# Patient Record
Sex: FEMALE | Race: WHITE | NOT HISPANIC OR LATINO | Employment: OTHER | URBAN - METROPOLITAN AREA
[De-identification: names, ages, dates, MRNs, and addresses within clinical notes are randomized per-mention and may not be internally consistent; named-entity substitution may affect disease eponyms.]

---

## 2017-03-06 ENCOUNTER — COMMUNICATION - HEALTHEAST (OUTPATIENT)
Dept: FAMILY MEDICINE | Facility: CLINIC | Age: 67
End: 2017-03-06

## 2017-03-06 ENCOUNTER — OFFICE VISIT - HEALTHEAST (OUTPATIENT)
Dept: FAMILY MEDICINE | Facility: CLINIC | Age: 67
End: 2017-03-06

## 2017-03-06 DIAGNOSIS — Z79.899 MEDICATION MANAGEMENT: ICD-10-CM

## 2017-03-06 DIAGNOSIS — R26.9 ABNORMALITY OF GAIT: ICD-10-CM

## 2017-03-06 DIAGNOSIS — R13.10 DYSPHAGIA, UNSPECIFIED TYPE: ICD-10-CM

## 2017-03-06 DIAGNOSIS — Z00.00 HEALTH CARE MAINTENANCE: ICD-10-CM

## 2017-03-06 DIAGNOSIS — R53.1 LEFT-SIDED WEAKNESS: ICD-10-CM

## 2017-03-06 DIAGNOSIS — I10 ESSENTIAL HYPERTENSION, BENIGN: ICD-10-CM

## 2017-03-06 ASSESSMENT — MIFFLIN-ST. JEOR: SCORE: 988.06

## 2017-03-13 ENCOUNTER — OFFICE VISIT - HEALTHEAST (OUTPATIENT)
Dept: PHYSICAL THERAPY | Facility: CLINIC | Age: 67
End: 2017-03-13

## 2017-03-13 DIAGNOSIS — R26.9 ABNORMALITY OF GAIT: ICD-10-CM

## 2017-03-13 DIAGNOSIS — I69.922: ICD-10-CM

## 2017-03-13 DIAGNOSIS — M94.9 DISORDER OF BONE AND CARTILAGE, UNSPECIFIED: ICD-10-CM

## 2017-03-13 DIAGNOSIS — M62.81 GENERALIZED MUSCLE WEAKNESS: ICD-10-CM

## 2017-03-13 DIAGNOSIS — Z86.73 HISTORY OF CVA (CEREBROVASCULAR ACCIDENT): ICD-10-CM

## 2017-03-13 DIAGNOSIS — I10 ESSENTIAL HYPERTENSION, BENIGN: ICD-10-CM

## 2017-03-13 DIAGNOSIS — M89.9 DISORDER OF BONE AND CARTILAGE, UNSPECIFIED: ICD-10-CM

## 2017-03-13 DIAGNOSIS — N18.30 CHRONIC KIDNEY DISEASE, STAGE III (MODERATE) (H): ICD-10-CM

## 2017-03-20 ENCOUNTER — OFFICE VISIT - HEALTHEAST (OUTPATIENT)
Dept: PHYSICAL THERAPY | Facility: CLINIC | Age: 67
End: 2017-03-20

## 2017-03-20 DIAGNOSIS — Z86.73 HISTORY OF CVA (CEREBROVASCULAR ACCIDENT): ICD-10-CM

## 2017-03-20 DIAGNOSIS — N18.30 CHRONIC KIDNEY DISEASE, STAGE III (MODERATE) (H): ICD-10-CM

## 2017-03-20 DIAGNOSIS — R26.9 ABNORMALITY OF GAIT: ICD-10-CM

## 2017-03-20 DIAGNOSIS — M94.9 DISORDER OF BONE AND CARTILAGE, UNSPECIFIED: ICD-10-CM

## 2017-03-20 DIAGNOSIS — M62.81 GENERALIZED MUSCLE WEAKNESS: ICD-10-CM

## 2017-03-20 DIAGNOSIS — I10 ESSENTIAL HYPERTENSION, BENIGN: ICD-10-CM

## 2017-03-20 DIAGNOSIS — I69.922: ICD-10-CM

## 2017-03-20 DIAGNOSIS — M89.9 DISORDER OF BONE AND CARTILAGE, UNSPECIFIED: ICD-10-CM

## 2017-03-21 ENCOUNTER — COMMUNICATION - HEALTHEAST (OUTPATIENT)
Dept: FAMILY MEDICINE | Facility: CLINIC | Age: 67
End: 2017-03-21

## 2017-03-27 ENCOUNTER — OFFICE VISIT - HEALTHEAST (OUTPATIENT)
Dept: PHYSICAL THERAPY | Facility: CLINIC | Age: 67
End: 2017-03-27

## 2017-03-27 DIAGNOSIS — I10 ESSENTIAL HYPERTENSION, BENIGN: ICD-10-CM

## 2017-03-27 DIAGNOSIS — Z86.73 HISTORY OF CVA (CEREBROVASCULAR ACCIDENT): ICD-10-CM

## 2017-03-27 DIAGNOSIS — M62.81 GENERALIZED MUSCLE WEAKNESS: ICD-10-CM

## 2017-03-27 DIAGNOSIS — M94.9 DISORDER OF BONE AND CARTILAGE, UNSPECIFIED: ICD-10-CM

## 2017-03-27 DIAGNOSIS — R26.9 ABNORMALITY OF GAIT: ICD-10-CM

## 2017-03-27 DIAGNOSIS — M89.9 DISORDER OF BONE AND CARTILAGE, UNSPECIFIED: ICD-10-CM

## 2017-03-27 DIAGNOSIS — N18.30 CHRONIC KIDNEY DISEASE, STAGE III (MODERATE) (H): ICD-10-CM

## 2017-03-27 DIAGNOSIS — I69.922: ICD-10-CM

## 2017-03-31 ENCOUNTER — RECORDS - HEALTHEAST (OUTPATIENT)
Dept: ADMINISTRATIVE | Facility: OTHER | Age: 67
End: 2017-03-31

## 2017-04-03 ENCOUNTER — HOSPITAL ENCOUNTER (OUTPATIENT)
Dept: MAMMOGRAPHY | Facility: HOSPITAL | Age: 67
Discharge: HOME | End: 2017-04-03
Attending: NURSE PRACTITIONER

## 2017-04-03 DIAGNOSIS — Z12.31 VISIT FOR SCREENING MAMMOGRAM: ICD-10-CM

## 2017-04-03 DIAGNOSIS — Z00.00 HEALTH CARE MAINTENANCE: ICD-10-CM

## 2017-04-04 ENCOUNTER — OFFICE VISIT - HEALTHEAST (OUTPATIENT)
Dept: PHYSICAL THERAPY | Facility: CLINIC | Age: 67
End: 2017-04-04

## 2017-04-04 DIAGNOSIS — N18.30 CHRONIC KIDNEY DISEASE, STAGE III (MODERATE) (H): ICD-10-CM

## 2017-04-04 DIAGNOSIS — M94.9 DISORDER OF BONE AND CARTILAGE, UNSPECIFIED: ICD-10-CM

## 2017-04-04 DIAGNOSIS — I10 ESSENTIAL HYPERTENSION, BENIGN: ICD-10-CM

## 2017-04-04 DIAGNOSIS — I69.922: ICD-10-CM

## 2017-04-04 DIAGNOSIS — R26.9 ABNORMALITY OF GAIT: ICD-10-CM

## 2017-04-04 DIAGNOSIS — M89.9 DISORDER OF BONE AND CARTILAGE, UNSPECIFIED: ICD-10-CM

## 2017-04-04 DIAGNOSIS — Z86.73 HISTORY OF CVA (CEREBROVASCULAR ACCIDENT): ICD-10-CM

## 2017-04-04 DIAGNOSIS — M62.81 GENERALIZED MUSCLE WEAKNESS: ICD-10-CM

## 2017-04-07 ENCOUNTER — RECORDS - HEALTHEAST (OUTPATIENT)
Dept: ADMINISTRATIVE | Facility: OTHER | Age: 67
End: 2017-04-07

## 2017-04-10 ENCOUNTER — HOSPITAL ENCOUNTER (OUTPATIENT)
Dept: RADIOLOGY | Facility: HOSPITAL | Age: 67
Discharge: HOME | End: 2017-04-10
Attending: PHYSICIAN ASSISTANT

## 2017-04-10 DIAGNOSIS — R10.13 DYSPEPSIA: ICD-10-CM

## 2017-04-11 ENCOUNTER — OFFICE VISIT - HEALTHEAST (OUTPATIENT)
Dept: PHYSICAL THERAPY | Facility: CLINIC | Age: 67
End: 2017-04-11

## 2017-04-11 DIAGNOSIS — I10 ESSENTIAL HYPERTENSION, BENIGN: ICD-10-CM

## 2017-04-11 DIAGNOSIS — M89.9 DISORDER OF BONE AND CARTILAGE, UNSPECIFIED: ICD-10-CM

## 2017-04-11 DIAGNOSIS — N18.30 CHRONIC KIDNEY DISEASE, STAGE III (MODERATE) (H): ICD-10-CM

## 2017-04-11 DIAGNOSIS — Z86.73 HISTORY OF CVA (CEREBROVASCULAR ACCIDENT): ICD-10-CM

## 2017-04-11 DIAGNOSIS — I69.922: ICD-10-CM

## 2017-04-11 DIAGNOSIS — M62.81 GENERALIZED MUSCLE WEAKNESS: ICD-10-CM

## 2017-04-11 DIAGNOSIS — M94.9 DISORDER OF BONE AND CARTILAGE, UNSPECIFIED: ICD-10-CM

## 2017-04-11 DIAGNOSIS — R26.9 ABNORMALITY OF GAIT: ICD-10-CM

## 2017-04-17 ENCOUNTER — OFFICE VISIT - HEALTHEAST (OUTPATIENT)
Dept: PHYSICAL THERAPY | Facility: CLINIC | Age: 67
End: 2017-04-17

## 2017-04-17 DIAGNOSIS — R26.9 ABNORMALITY OF GAIT: ICD-10-CM

## 2017-04-17 DIAGNOSIS — N18.30 CHRONIC KIDNEY DISEASE, STAGE III (MODERATE) (H): ICD-10-CM

## 2017-04-17 DIAGNOSIS — M62.81 GENERALIZED MUSCLE WEAKNESS: ICD-10-CM

## 2017-04-17 DIAGNOSIS — I10 ESSENTIAL HYPERTENSION, BENIGN: ICD-10-CM

## 2017-04-17 DIAGNOSIS — Z86.73 HISTORY OF CVA (CEREBROVASCULAR ACCIDENT): ICD-10-CM

## 2017-04-17 DIAGNOSIS — I69.922: ICD-10-CM

## 2017-04-17 DIAGNOSIS — M94.9 DISORDER OF BONE AND CARTILAGE, UNSPECIFIED: ICD-10-CM

## 2017-04-17 DIAGNOSIS — M89.9 DISORDER OF BONE AND CARTILAGE, UNSPECIFIED: ICD-10-CM

## 2017-04-24 ENCOUNTER — OFFICE VISIT - HEALTHEAST (OUTPATIENT)
Dept: PHYSICAL THERAPY | Facility: CLINIC | Age: 67
End: 2017-04-24

## 2017-04-24 DIAGNOSIS — I69.922: ICD-10-CM

## 2017-04-24 DIAGNOSIS — M89.9 DISORDER OF BONE AND CARTILAGE, UNSPECIFIED: ICD-10-CM

## 2017-04-24 DIAGNOSIS — N18.30 CHRONIC KIDNEY DISEASE, STAGE III (MODERATE) (H): ICD-10-CM

## 2017-04-24 DIAGNOSIS — I10 ESSENTIAL HYPERTENSION, BENIGN: ICD-10-CM

## 2017-04-24 DIAGNOSIS — M94.9 DISORDER OF BONE AND CARTILAGE, UNSPECIFIED: ICD-10-CM

## 2017-04-24 DIAGNOSIS — M62.81 GENERALIZED MUSCLE WEAKNESS: ICD-10-CM

## 2017-04-24 DIAGNOSIS — Z86.73 HISTORY OF CVA (CEREBROVASCULAR ACCIDENT): ICD-10-CM

## 2017-04-24 DIAGNOSIS — R26.9 ABNORMALITY OF GAIT: ICD-10-CM

## 2017-05-08 ENCOUNTER — OFFICE VISIT - HEALTHEAST (OUTPATIENT)
Dept: PHYSICAL THERAPY | Facility: CLINIC | Age: 67
End: 2017-05-08

## 2017-05-08 DIAGNOSIS — Z86.73 HISTORY OF CVA (CEREBROVASCULAR ACCIDENT): ICD-10-CM

## 2017-05-08 DIAGNOSIS — N18.30 CHRONIC KIDNEY DISEASE, STAGE III (MODERATE) (H): ICD-10-CM

## 2017-05-08 DIAGNOSIS — M94.9 DISORDER OF BONE AND CARTILAGE, UNSPECIFIED: ICD-10-CM

## 2017-05-08 DIAGNOSIS — I10 ESSENTIAL HYPERTENSION, BENIGN: ICD-10-CM

## 2017-05-08 DIAGNOSIS — I69.922: ICD-10-CM

## 2017-05-08 DIAGNOSIS — R26.9 ABNORMALITY OF GAIT: ICD-10-CM

## 2017-05-08 DIAGNOSIS — M62.81 GENERALIZED MUSCLE WEAKNESS: ICD-10-CM

## 2017-05-08 DIAGNOSIS — M89.9 DISORDER OF BONE AND CARTILAGE, UNSPECIFIED: ICD-10-CM

## 2017-05-17 ENCOUNTER — OFFICE VISIT - HEALTHEAST (OUTPATIENT)
Dept: PHYSICAL THERAPY | Facility: CLINIC | Age: 67
End: 2017-05-17

## 2017-05-17 DIAGNOSIS — I69.922: ICD-10-CM

## 2017-05-17 DIAGNOSIS — M89.9 DISORDER OF BONE AND CARTILAGE, UNSPECIFIED: ICD-10-CM

## 2017-05-17 DIAGNOSIS — M94.9 DISORDER OF BONE AND CARTILAGE, UNSPECIFIED: ICD-10-CM

## 2017-05-17 DIAGNOSIS — I10 ESSENTIAL HYPERTENSION, BENIGN: ICD-10-CM

## 2017-05-17 DIAGNOSIS — M62.81 GENERALIZED MUSCLE WEAKNESS: ICD-10-CM

## 2017-05-17 DIAGNOSIS — R26.9 ABNORMALITY OF GAIT: ICD-10-CM

## 2017-05-17 DIAGNOSIS — N18.30 CHRONIC KIDNEY DISEASE, STAGE III (MODERATE) (H): ICD-10-CM

## 2017-05-17 DIAGNOSIS — Z86.73 HISTORY OF CVA (CEREBROVASCULAR ACCIDENT): ICD-10-CM

## 2017-05-26 ENCOUNTER — RECORDS - HEALTHEAST (OUTPATIENT)
Dept: ADMINISTRATIVE | Facility: OTHER | Age: 67
End: 2017-05-26

## 2017-05-30 ENCOUNTER — RECORDS - HEALTHEAST (OUTPATIENT)
Dept: ADMINISTRATIVE | Facility: OTHER | Age: 67
End: 2017-05-30

## 2017-06-02 ENCOUNTER — OFFICE VISIT - HEALTHEAST (OUTPATIENT)
Dept: PHYSICAL THERAPY | Facility: CLINIC | Age: 67
End: 2017-06-02

## 2017-06-02 DIAGNOSIS — M89.9 DISORDER OF BONE AND CARTILAGE, UNSPECIFIED: ICD-10-CM

## 2017-06-02 DIAGNOSIS — I69.922: ICD-10-CM

## 2017-06-02 DIAGNOSIS — N18.30 CHRONIC KIDNEY DISEASE, STAGE III (MODERATE) (H): ICD-10-CM

## 2017-06-02 DIAGNOSIS — I10 ESSENTIAL HYPERTENSION, BENIGN: ICD-10-CM

## 2017-06-02 DIAGNOSIS — M62.81 GENERALIZED MUSCLE WEAKNESS: ICD-10-CM

## 2017-06-02 DIAGNOSIS — R26.9 ABNORMALITY OF GAIT: ICD-10-CM

## 2017-06-02 DIAGNOSIS — M94.9 DISORDER OF BONE AND CARTILAGE, UNSPECIFIED: ICD-10-CM

## 2017-06-02 DIAGNOSIS — Z86.73 HISTORY OF CVA (CEREBROVASCULAR ACCIDENT): ICD-10-CM

## 2017-06-07 ENCOUNTER — OFFICE VISIT - HEALTHEAST (OUTPATIENT)
Dept: PHYSICAL THERAPY | Facility: CLINIC | Age: 67
End: 2017-06-07

## 2017-06-07 ENCOUNTER — OFFICE VISIT - HEALTHEAST (OUTPATIENT)
Dept: FAMILY MEDICINE | Facility: CLINIC | Age: 67
End: 2017-06-07

## 2017-06-07 DIAGNOSIS — M62.81 GENERALIZED MUSCLE WEAKNESS: ICD-10-CM

## 2017-06-07 DIAGNOSIS — N18.30 CHRONIC KIDNEY DISEASE, STAGE III (MODERATE) (H): ICD-10-CM

## 2017-06-07 DIAGNOSIS — R26.9 ABNORMALITY OF GAIT: ICD-10-CM

## 2017-06-07 DIAGNOSIS — I69.922: ICD-10-CM

## 2017-06-07 DIAGNOSIS — M94.9 DISORDER OF BONE AND CARTILAGE, UNSPECIFIED: ICD-10-CM

## 2017-06-07 DIAGNOSIS — M89.9 DISORDER OF BONE AND CARTILAGE, UNSPECIFIED: ICD-10-CM

## 2017-06-07 DIAGNOSIS — Z86.73 HISTORY OF CVA (CEREBROVASCULAR ACCIDENT): ICD-10-CM

## 2017-06-07 DIAGNOSIS — I10 ESSENTIAL HYPERTENSION, BENIGN: ICD-10-CM

## 2017-06-16 ENCOUNTER — OFFICE VISIT - HEALTHEAST (OUTPATIENT)
Dept: PHYSICAL THERAPY | Facility: CLINIC | Age: 67
End: 2017-06-16

## 2017-06-16 DIAGNOSIS — I10 ESSENTIAL HYPERTENSION, BENIGN: ICD-10-CM

## 2017-06-16 DIAGNOSIS — M89.9 DISORDER OF BONE AND CARTILAGE, UNSPECIFIED: ICD-10-CM

## 2017-06-16 DIAGNOSIS — M94.9 DISORDER OF BONE AND CARTILAGE, UNSPECIFIED: ICD-10-CM

## 2017-06-16 DIAGNOSIS — Z86.73 HISTORY OF CVA (CEREBROVASCULAR ACCIDENT): ICD-10-CM

## 2017-06-16 DIAGNOSIS — I69.922: ICD-10-CM

## 2017-06-16 DIAGNOSIS — M62.81 GENERALIZED MUSCLE WEAKNESS: ICD-10-CM

## 2017-06-16 DIAGNOSIS — N18.30 CHRONIC KIDNEY DISEASE, STAGE III (MODERATE) (H): ICD-10-CM

## 2017-06-16 DIAGNOSIS — R26.9 ABNORMALITY OF GAIT: ICD-10-CM

## 2017-06-21 ENCOUNTER — OFFICE VISIT - HEALTHEAST (OUTPATIENT)
Dept: PHYSICAL THERAPY | Facility: CLINIC | Age: 67
End: 2017-06-21

## 2017-06-21 DIAGNOSIS — M89.9 DISORDER OF BONE AND CARTILAGE, UNSPECIFIED: ICD-10-CM

## 2017-06-21 DIAGNOSIS — N18.30 CHRONIC KIDNEY DISEASE, STAGE III (MODERATE) (H): ICD-10-CM

## 2017-06-21 DIAGNOSIS — Z86.73 HISTORY OF CVA (CEREBROVASCULAR ACCIDENT): ICD-10-CM

## 2017-06-21 DIAGNOSIS — M62.81 GENERALIZED MUSCLE WEAKNESS: ICD-10-CM

## 2017-06-21 DIAGNOSIS — R26.9 ABNORMALITY OF GAIT: ICD-10-CM

## 2017-06-21 DIAGNOSIS — I10 ESSENTIAL HYPERTENSION, BENIGN: ICD-10-CM

## 2017-06-21 DIAGNOSIS — I69.922: ICD-10-CM

## 2017-06-21 DIAGNOSIS — M94.9 DISORDER OF BONE AND CARTILAGE, UNSPECIFIED: ICD-10-CM

## 2017-07-03 ENCOUNTER — OFFICE VISIT - HEALTHEAST (OUTPATIENT)
Dept: PHYSICAL THERAPY | Facility: REHABILITATION | Age: 67
End: 2017-07-03

## 2017-07-03 DIAGNOSIS — I69.922: ICD-10-CM

## 2017-07-03 DIAGNOSIS — M94.9 DISORDER OF BONE AND CARTILAGE, UNSPECIFIED: ICD-10-CM

## 2017-07-03 DIAGNOSIS — I10 ESSENTIAL HYPERTENSION, BENIGN: ICD-10-CM

## 2017-07-03 DIAGNOSIS — N18.30 CHRONIC KIDNEY DISEASE, STAGE III (MODERATE) (H): ICD-10-CM

## 2017-07-03 DIAGNOSIS — Z86.73 HISTORY OF CVA (CEREBROVASCULAR ACCIDENT): ICD-10-CM

## 2017-07-03 DIAGNOSIS — M62.81 GENERALIZED MUSCLE WEAKNESS: ICD-10-CM

## 2017-07-03 DIAGNOSIS — M89.9 DISORDER OF BONE AND CARTILAGE, UNSPECIFIED: ICD-10-CM

## 2017-07-03 DIAGNOSIS — R26.9 ABNORMALITY OF GAIT: ICD-10-CM

## 2017-07-07 ENCOUNTER — HOME CARE/HOSPICE - HEALTHEAST (OUTPATIENT)
Dept: HOME HEALTH SERVICES | Facility: HOME HEALTH | Age: 67
End: 2017-07-07

## 2017-07-10 ENCOUNTER — OFFICE VISIT - HEALTHEAST (OUTPATIENT)
Dept: PHYSICAL THERAPY | Facility: REHABILITATION | Age: 67
End: 2017-07-10

## 2017-07-10 ENCOUNTER — OFFICE VISIT - HEALTHEAST (OUTPATIENT)
Dept: FAMILY MEDICINE | Facility: CLINIC | Age: 67
End: 2017-07-10

## 2017-07-10 DIAGNOSIS — Z86.73 HISTORY OF CVA (CEREBROVASCULAR ACCIDENT): ICD-10-CM

## 2017-07-10 DIAGNOSIS — I69.922: ICD-10-CM

## 2017-07-10 DIAGNOSIS — I10 ESSENTIAL HYPERTENSION: ICD-10-CM

## 2017-07-10 DIAGNOSIS — R26.9 ABNORMALITY OF GAIT: ICD-10-CM

## 2017-07-10 DIAGNOSIS — J18.9 COMMUNITY ACQUIRED PNEUMONIA: ICD-10-CM

## 2017-07-10 DIAGNOSIS — I10 ESSENTIAL HYPERTENSION, BENIGN: ICD-10-CM

## 2017-07-10 DIAGNOSIS — N18.30 CHRONIC KIDNEY DISEASE, STAGE III (MODERATE) (H): ICD-10-CM

## 2017-07-10 DIAGNOSIS — M62.81 GENERALIZED MUSCLE WEAKNESS: ICD-10-CM

## 2017-07-10 DIAGNOSIS — M89.9 DISORDER OF BONE AND CARTILAGE, UNSPECIFIED: ICD-10-CM

## 2017-07-10 DIAGNOSIS — R19.7 DIARRHEA: ICD-10-CM

## 2017-07-10 DIAGNOSIS — M94.9 DISORDER OF BONE AND CARTILAGE, UNSPECIFIED: ICD-10-CM

## 2017-07-17 ENCOUNTER — OFFICE VISIT - HEALTHEAST (OUTPATIENT)
Dept: PHYSICAL THERAPY | Facility: REHABILITATION | Age: 67
End: 2017-07-17

## 2017-07-17 DIAGNOSIS — M94.9 DISORDER OF BONE AND CARTILAGE, UNSPECIFIED: ICD-10-CM

## 2017-07-17 DIAGNOSIS — R26.9 ABNORMALITY OF GAIT: ICD-10-CM

## 2017-07-17 DIAGNOSIS — I69.922: ICD-10-CM

## 2017-07-17 DIAGNOSIS — M62.81 GENERALIZED MUSCLE WEAKNESS: ICD-10-CM

## 2017-07-17 DIAGNOSIS — Z86.73 HISTORY OF CVA (CEREBROVASCULAR ACCIDENT): ICD-10-CM

## 2017-07-17 DIAGNOSIS — N18.30 CHRONIC KIDNEY DISEASE, STAGE III (MODERATE) (H): ICD-10-CM

## 2017-07-17 DIAGNOSIS — M89.9 DISORDER OF BONE AND CARTILAGE, UNSPECIFIED: ICD-10-CM

## 2017-07-17 DIAGNOSIS — I10 ESSENTIAL HYPERTENSION, BENIGN: ICD-10-CM

## 2017-07-24 ENCOUNTER — OFFICE VISIT - HEALTHEAST (OUTPATIENT)
Dept: PHYSICAL THERAPY | Facility: REHABILITATION | Age: 67
End: 2017-07-24

## 2017-07-24 DIAGNOSIS — N18.30 CHRONIC KIDNEY DISEASE, STAGE III (MODERATE) (H): ICD-10-CM

## 2017-07-24 DIAGNOSIS — Z86.73 HISTORY OF CVA (CEREBROVASCULAR ACCIDENT): ICD-10-CM

## 2017-07-24 DIAGNOSIS — I10 ESSENTIAL HYPERTENSION, BENIGN: ICD-10-CM

## 2017-07-24 DIAGNOSIS — M94.9 DISORDER OF BONE AND CARTILAGE, UNSPECIFIED: ICD-10-CM

## 2017-07-24 DIAGNOSIS — M62.81 GENERALIZED MUSCLE WEAKNESS: ICD-10-CM

## 2017-07-24 DIAGNOSIS — I69.922: ICD-10-CM

## 2017-07-24 DIAGNOSIS — R26.9 ABNORMALITY OF GAIT: ICD-10-CM

## 2017-07-24 DIAGNOSIS — M89.9 DISORDER OF BONE AND CARTILAGE, UNSPECIFIED: ICD-10-CM

## 2017-07-31 ENCOUNTER — OFFICE VISIT - HEALTHEAST (OUTPATIENT)
Dept: PHYSICAL THERAPY | Facility: REHABILITATION | Age: 67
End: 2017-07-31

## 2017-07-31 DIAGNOSIS — M62.81 GENERALIZED MUSCLE WEAKNESS: ICD-10-CM

## 2017-07-31 DIAGNOSIS — I69.922: ICD-10-CM

## 2017-07-31 DIAGNOSIS — N18.30 CHRONIC KIDNEY DISEASE, STAGE III (MODERATE) (H): ICD-10-CM

## 2017-07-31 DIAGNOSIS — I10 ESSENTIAL HYPERTENSION, BENIGN: ICD-10-CM

## 2017-07-31 DIAGNOSIS — M94.9 DISORDER OF BONE AND CARTILAGE, UNSPECIFIED: ICD-10-CM

## 2017-07-31 DIAGNOSIS — R26.9 ABNORMALITY OF GAIT: ICD-10-CM

## 2017-07-31 DIAGNOSIS — M89.9 DISORDER OF BONE AND CARTILAGE, UNSPECIFIED: ICD-10-CM

## 2017-07-31 DIAGNOSIS — Z86.73 HISTORY OF CVA (CEREBROVASCULAR ACCIDENT): ICD-10-CM

## 2017-08-02 ENCOUNTER — OFFICE VISIT - HEALTHEAST (OUTPATIENT)
Dept: FAMILY MEDICINE | Facility: CLINIC | Age: 67
End: 2017-08-02

## 2017-08-02 ENCOUNTER — RECORDS - HEALTHEAST (OUTPATIENT)
Dept: ADMINISTRATIVE | Facility: OTHER | Age: 67
End: 2017-08-02

## 2017-08-02 DIAGNOSIS — J18.9 PNEUMONIA: ICD-10-CM

## 2017-08-02 DIAGNOSIS — R13.10 SWALLOWING DISORDER: ICD-10-CM

## 2017-08-02 DIAGNOSIS — I69.322 CVA, OLD, DYSARTHRIA: ICD-10-CM

## 2017-08-02 DIAGNOSIS — I10 HTN (HYPERTENSION): ICD-10-CM

## 2017-08-30 ENCOUNTER — OFFICE VISIT - HEALTHEAST (OUTPATIENT)
Dept: PHYSICAL THERAPY | Facility: REHABILITATION | Age: 67
End: 2017-08-30

## 2017-08-30 DIAGNOSIS — I69.922: ICD-10-CM

## 2017-08-30 DIAGNOSIS — M89.9 DISORDER OF BONE AND CARTILAGE, UNSPECIFIED: ICD-10-CM

## 2017-08-30 DIAGNOSIS — R26.9 ABNORMALITY OF GAIT: ICD-10-CM

## 2017-08-30 DIAGNOSIS — I10 ESSENTIAL HYPERTENSION, BENIGN: ICD-10-CM

## 2017-08-30 DIAGNOSIS — M62.81 GENERALIZED MUSCLE WEAKNESS: ICD-10-CM

## 2017-08-30 DIAGNOSIS — N18.30 CHRONIC KIDNEY DISEASE, STAGE III (MODERATE) (H): ICD-10-CM

## 2017-08-30 DIAGNOSIS — Z86.73 HISTORY OF CVA (CEREBROVASCULAR ACCIDENT): ICD-10-CM

## 2017-08-30 DIAGNOSIS — M94.9 DISORDER OF BONE AND CARTILAGE, UNSPECIFIED: ICD-10-CM

## 2017-09-07 ENCOUNTER — OFFICE VISIT - HEALTHEAST (OUTPATIENT)
Dept: FAMILY MEDICINE | Facility: CLINIC | Age: 67
End: 2017-09-07

## 2017-09-07 DIAGNOSIS — Z00.00 HEALTH CARE MAINTENANCE: ICD-10-CM

## 2017-09-07 DIAGNOSIS — I10 HTN (HYPERTENSION), BENIGN: ICD-10-CM

## 2017-09-07 ASSESSMENT — MIFFLIN-ST. JEOR: SCORE: 988.97

## 2017-10-02 ENCOUNTER — OFFICE VISIT - HEALTHEAST (OUTPATIENT)
Dept: PHYSICAL THERAPY | Facility: REHABILITATION | Age: 67
End: 2017-10-02

## 2017-10-02 DIAGNOSIS — M62.81 GENERALIZED MUSCLE WEAKNESS: ICD-10-CM

## 2017-10-02 DIAGNOSIS — R26.9 ABNORMALITY OF GAIT: ICD-10-CM

## 2017-10-02 DIAGNOSIS — N18.30 CHRONIC KIDNEY DISEASE, STAGE III (MODERATE) (H): ICD-10-CM

## 2017-10-02 DIAGNOSIS — I10 ESSENTIAL HYPERTENSION, BENIGN: ICD-10-CM

## 2017-10-02 DIAGNOSIS — Z86.73 HISTORY OF CVA (CEREBROVASCULAR ACCIDENT): ICD-10-CM

## 2017-10-13 ENCOUNTER — OFFICE VISIT - HEALTHEAST (OUTPATIENT)
Dept: PHYSICAL THERAPY | Facility: REHABILITATION | Age: 67
End: 2017-10-13

## 2017-10-13 DIAGNOSIS — M62.81 GENERALIZED MUSCLE WEAKNESS: ICD-10-CM

## 2017-10-13 DIAGNOSIS — I10 ESSENTIAL HYPERTENSION, BENIGN: ICD-10-CM

## 2017-10-13 DIAGNOSIS — N18.30 CHRONIC KIDNEY DISEASE, STAGE III (MODERATE) (H): ICD-10-CM

## 2017-10-13 DIAGNOSIS — R26.9 ABNORMALITY OF GAIT: ICD-10-CM

## 2017-10-13 DIAGNOSIS — Z86.73 HISTORY OF CVA (CEREBROVASCULAR ACCIDENT): ICD-10-CM

## 2017-11-06 ENCOUNTER — OFFICE VISIT - HEALTHEAST (OUTPATIENT)
Dept: PHYSICAL THERAPY | Facility: REHABILITATION | Age: 67
End: 2017-11-06

## 2017-11-06 DIAGNOSIS — M62.81 GENERALIZED MUSCLE WEAKNESS: ICD-10-CM

## 2017-11-06 DIAGNOSIS — I10 ESSENTIAL HYPERTENSION, BENIGN: ICD-10-CM

## 2017-11-06 DIAGNOSIS — R26.9 ABNORMALITY OF GAIT: ICD-10-CM

## 2017-11-06 DIAGNOSIS — Z86.73 HISTORY OF CVA (CEREBROVASCULAR ACCIDENT): ICD-10-CM

## 2017-11-06 DIAGNOSIS — N18.30 CHRONIC KIDNEY DISEASE, STAGE III (MODERATE) (H): ICD-10-CM

## 2018-06-15 ENCOUNTER — OFFICE VISIT - HEALTHEAST (OUTPATIENT)
Dept: FAMILY MEDICINE | Facility: CLINIC | Age: 68
End: 2018-06-15

## 2018-06-15 DIAGNOSIS — R26.9 ABNORMALITY OF GAIT: ICD-10-CM

## 2018-06-15 DIAGNOSIS — I10 ESSENTIAL HYPERTENSION, BENIGN: ICD-10-CM

## 2018-06-15 DIAGNOSIS — R13.12 DYSPHAGIA, OROPHARYNGEAL PHASE: ICD-10-CM

## 2018-06-15 DIAGNOSIS — N18.30 CKD (CHRONIC KIDNEY DISEASE) STAGE 3, GFR 30-59 ML/MIN (H): ICD-10-CM

## 2018-06-15 DIAGNOSIS — I69.322 CVA, OLD, DYSARTHRIA: ICD-10-CM

## 2018-06-15 LAB
ANION GAP SERPL CALCULATED.3IONS-SCNC: 15 MMOL/L (ref 5–18)
BUN SERPL-MCNC: 31 MG/DL (ref 8–22)
CALCIUM SERPL-MCNC: 10.1 MG/DL (ref 8.5–10.5)
CHLORIDE BLD-SCNC: 105 MMOL/L (ref 98–107)
CO2 SERPL-SCNC: 21 MMOL/L (ref 22–31)
CREAT SERPL-MCNC: 1.9 MG/DL (ref 0.6–1.1)
ERYTHROCYTE [DISTWIDTH] IN BLOOD BY AUTOMATED COUNT: 13.7 % (ref 11–14.5)
GFR SERPL CREATININE-BSD FRML MDRD: 26 ML/MIN/1.73M2
GLUCOSE BLD-MCNC: 95 MG/DL (ref 70–125)
HCT VFR BLD AUTO: 38.7 % (ref 35–47)
HGB BLD-MCNC: 13.2 G/DL (ref 12–16)
MCH RBC QN AUTO: 25.9 PG (ref 27–34)
MCHC RBC AUTO-ENTMCNC: 34.1 G/DL (ref 32–36)
MCV RBC AUTO: 76 FL (ref 80–100)
PLATELET # BLD AUTO: 187 THOU/UL (ref 140–440)
PMV BLD AUTO: 9.9 FL (ref 7–10)
POTASSIUM BLD-SCNC: 4.6 MMOL/L (ref 3.5–5)
RBC # BLD AUTO: 5.1 MILL/UL (ref 3.8–5.4)
SODIUM SERPL-SCNC: 141 MMOL/L (ref 136–145)
WBC: 7.8 THOU/UL (ref 4–11)

## 2018-06-15 ASSESSMENT — MIFFLIN-ST. JEOR: SCORE: 1016.64

## 2018-06-18 ENCOUNTER — COMMUNICATION - HEALTHEAST (OUTPATIENT)
Dept: FAMILY MEDICINE | Facility: CLINIC | Age: 68
End: 2018-06-18

## 2018-06-18 LAB — 25(OH)D3 SERPL-MCNC: 44 NG/ML (ref 30–80)

## 2018-06-29 ENCOUNTER — COMMUNICATION - HEALTHEAST (OUTPATIENT)
Dept: FAMILY MEDICINE | Facility: CLINIC | Age: 68
End: 2018-06-29

## 2018-06-29 DIAGNOSIS — I10 HTN (HYPERTENSION): ICD-10-CM

## 2018-07-06 ENCOUNTER — COMMUNICATION - HEALTHEAST (OUTPATIENT)
Dept: FAMILY MEDICINE | Facility: CLINIC | Age: 68
End: 2018-07-06

## 2018-07-10 ENCOUNTER — OFFICE VISIT - HEALTHEAST (OUTPATIENT)
Dept: PHYSICAL THERAPY | Facility: REHABILITATION | Age: 68
End: 2018-07-10

## 2018-07-10 DIAGNOSIS — Z86.73 HISTORY OF CVA (CEREBROVASCULAR ACCIDENT): ICD-10-CM

## 2018-07-10 DIAGNOSIS — R26.9 ABNORMALITY OF GAIT: ICD-10-CM

## 2018-07-10 DIAGNOSIS — M62.81 GENERALIZED MUSCLE WEAKNESS: ICD-10-CM

## 2018-07-16 ENCOUNTER — OFFICE VISIT - HEALTHEAST (OUTPATIENT)
Dept: PHYSICAL THERAPY | Facility: REHABILITATION | Age: 68
End: 2018-07-16

## 2018-07-16 DIAGNOSIS — Z86.73 HISTORY OF CVA (CEREBROVASCULAR ACCIDENT): ICD-10-CM

## 2018-07-16 DIAGNOSIS — N18.30 CHRONIC KIDNEY DISEASE, STAGE III (MODERATE) (H): ICD-10-CM

## 2018-07-16 DIAGNOSIS — M62.81 GENERALIZED MUSCLE WEAKNESS: ICD-10-CM

## 2018-07-16 DIAGNOSIS — I10 ESSENTIAL HYPERTENSION, BENIGN: ICD-10-CM

## 2018-07-16 DIAGNOSIS — R26.9 ABNORMALITY OF GAIT: ICD-10-CM

## 2018-07-23 ENCOUNTER — OFFICE VISIT - HEALTHEAST (OUTPATIENT)
Dept: PHYSICAL THERAPY | Facility: REHABILITATION | Age: 68
End: 2018-07-23

## 2018-07-23 DIAGNOSIS — R26.9 ABNORMALITY OF GAIT: ICD-10-CM

## 2018-07-23 DIAGNOSIS — M62.81 GENERALIZED MUSCLE WEAKNESS: ICD-10-CM

## 2018-07-23 DIAGNOSIS — I10 ESSENTIAL HYPERTENSION, BENIGN: ICD-10-CM

## 2018-07-23 DIAGNOSIS — Z86.73 HISTORY OF CVA (CEREBROVASCULAR ACCIDENT): ICD-10-CM

## 2018-07-23 DIAGNOSIS — N18.30 CHRONIC KIDNEY DISEASE, STAGE III (MODERATE) (H): ICD-10-CM

## 2018-07-30 ENCOUNTER — OFFICE VISIT - HEALTHEAST (OUTPATIENT)
Dept: PHYSICAL THERAPY | Facility: REHABILITATION | Age: 68
End: 2018-07-30

## 2018-07-30 DIAGNOSIS — R26.9 ABNORMALITY OF GAIT: ICD-10-CM

## 2018-07-30 DIAGNOSIS — Z86.73 HISTORY OF CVA (CEREBROVASCULAR ACCIDENT): ICD-10-CM

## 2018-07-30 DIAGNOSIS — M62.81 GENERALIZED MUSCLE WEAKNESS: ICD-10-CM

## 2018-08-06 ENCOUNTER — OFFICE VISIT - HEALTHEAST (OUTPATIENT)
Dept: PHYSICAL THERAPY | Facility: REHABILITATION | Age: 68
End: 2018-08-06

## 2018-08-06 DIAGNOSIS — R26.9 ABNORMALITY OF GAIT: ICD-10-CM

## 2018-08-06 DIAGNOSIS — M62.81 GENERALIZED MUSCLE WEAKNESS: ICD-10-CM

## 2018-08-06 DIAGNOSIS — Z86.73 HISTORY OF CVA (CEREBROVASCULAR ACCIDENT): ICD-10-CM

## 2018-08-13 ENCOUNTER — OFFICE VISIT - HEALTHEAST (OUTPATIENT)
Dept: PHYSICAL THERAPY | Facility: REHABILITATION | Age: 68
End: 2018-08-13

## 2018-08-13 DIAGNOSIS — N18.30 CHRONIC KIDNEY DISEASE, STAGE III (MODERATE) (H): ICD-10-CM

## 2018-08-13 DIAGNOSIS — I10 ESSENTIAL HYPERTENSION, BENIGN: ICD-10-CM

## 2018-08-13 DIAGNOSIS — R26.9 ABNORMALITY OF GAIT: ICD-10-CM

## 2018-08-13 DIAGNOSIS — Z86.73 HISTORY OF CVA (CEREBROVASCULAR ACCIDENT): ICD-10-CM

## 2018-08-13 DIAGNOSIS — M62.81 GENERALIZED MUSCLE WEAKNESS: ICD-10-CM

## 2018-08-17 ENCOUNTER — HOME CARE/HOSPICE - HEALTHEAST (OUTPATIENT)
Dept: HOME HEALTH SERVICES | Facility: HOME HEALTH | Age: 68
End: 2018-08-17

## 2018-08-20 ENCOUNTER — COMMUNICATION - HEALTHEAST (OUTPATIENT)
Dept: PHYSICAL THERAPY | Facility: REHABILITATION | Age: 68
End: 2018-08-20

## 2018-08-27 ENCOUNTER — RECORDS - HEALTHEAST (OUTPATIENT)
Dept: LAB | Facility: CLINIC | Age: 68
End: 2018-08-27

## 2018-08-27 LAB
ANION GAP SERPL CALCULATED.3IONS-SCNC: 9 MMOL/L (ref 5–18)
BASOPHILS # BLD AUTO: 0.1 THOU/UL (ref 0–0.2)
BASOPHILS NFR BLD AUTO: 1 % (ref 0–2)
BUN SERPL-MCNC: 28 MG/DL (ref 8–22)
CALCIUM SERPL-MCNC: 9.2 MG/DL (ref 8.5–10.5)
CHLORIDE BLD-SCNC: 108 MMOL/L (ref 98–107)
CO2 SERPL-SCNC: 23 MMOL/L (ref 22–31)
CREAT SERPL-MCNC: 1.11 MG/DL (ref 0.6–1.1)
EOSINOPHIL # BLD AUTO: 0.3 THOU/UL (ref 0–0.4)
EOSINOPHIL NFR BLD AUTO: 3 % (ref 0–6)
ERYTHROCYTE [DISTWIDTH] IN BLOOD BY AUTOMATED COUNT: 16.4 % (ref 11–14.5)
GFR SERPL CREATININE-BSD FRML MDRD: 49 ML/MIN/1.73M2
GLUCOSE BLD-MCNC: 110 MG/DL (ref 70–125)
HCT VFR BLD AUTO: 33.3 % (ref 35–47)
HGB BLD-MCNC: 10.8 G/DL (ref 12–16)
LYMPHOCYTES # BLD AUTO: 1.3 THOU/UL (ref 0.8–4.4)
LYMPHOCYTES NFR BLD AUTO: 14 % (ref 20–40)
MAGNESIUM SERPL-MCNC: 2.2 MG/DL (ref 1.8–2.6)
MCH RBC QN AUTO: 26 PG (ref 27–34)
MCHC RBC AUTO-ENTMCNC: 32.4 G/DL (ref 32–36)
MCV RBC AUTO: 80 FL (ref 80–100)
MONOCYTES # BLD AUTO: 0.7 THOU/UL (ref 0–0.9)
MONOCYTES NFR BLD AUTO: 8 % (ref 2–10)
NEUTROPHILS # BLD AUTO: 6.7 THOU/UL (ref 2–7.7)
NEUTROPHILS NFR BLD AUTO: 75 % (ref 50–70)
PLATELET # BLD AUTO: 193 THOU/UL (ref 140–440)
PMV BLD AUTO: 11.8 FL (ref 8.5–12.5)
POTASSIUM BLD-SCNC: 4.7 MMOL/L (ref 3.5–5)
RBC # BLD AUTO: 4.16 MILL/UL (ref 3.8–5.4)
SODIUM SERPL-SCNC: 140 MMOL/L (ref 136–145)
TRANSFERRIN SERPL-MCNC: 193 MG/DL (ref 212–360)
WBC: 9.1 THOU/UL (ref 4–11)

## 2018-08-28 LAB
FERRITIN SERPL-MCNC: 83 NG/ML (ref 10–130)
FOLATE SERPL-MCNC: 8.9 NG/ML
VIT B12 SERPL-MCNC: 304 PG/ML (ref 213–816)

## 2018-08-30 ENCOUNTER — OFFICE VISIT - HEALTHEAST (OUTPATIENT)
Dept: GERIATRICS | Facility: CLINIC | Age: 68
End: 2018-08-30

## 2018-08-30 DIAGNOSIS — I10 ESSENTIAL HYPERTENSION: ICD-10-CM

## 2018-08-30 DIAGNOSIS — J69.0 ASPIRATION PNEUMONITIS (H): ICD-10-CM

## 2018-08-30 DIAGNOSIS — D64.9 ANEMIA: ICD-10-CM

## 2018-08-30 DIAGNOSIS — Z86.73 HISTORY OF STROKE: ICD-10-CM

## 2018-08-30 DIAGNOSIS — N18.9 CKD (CHRONIC KIDNEY DISEASE): ICD-10-CM

## 2018-08-30 DIAGNOSIS — R13.10 DYSPHAGIA: ICD-10-CM

## 2018-09-06 ENCOUNTER — OFFICE VISIT - HEALTHEAST (OUTPATIENT)
Dept: GERIATRICS | Facility: CLINIC | Age: 68
End: 2018-09-06

## 2018-09-06 DIAGNOSIS — N18.9 CKD (CHRONIC KIDNEY DISEASE): ICD-10-CM

## 2018-09-06 DIAGNOSIS — J69.0 ASPIRATION PNEUMONITIS (H): ICD-10-CM

## 2018-09-06 DIAGNOSIS — R13.12 OROPHARYNGEAL DYSPHAGIA: ICD-10-CM

## 2018-09-06 DIAGNOSIS — I10 HYPERTENSION: ICD-10-CM

## 2018-09-06 DIAGNOSIS — Z86.73 HISTORY OF STROKE: ICD-10-CM

## 2018-09-13 ENCOUNTER — OFFICE VISIT - HEALTHEAST (OUTPATIENT)
Dept: GERIATRICS | Facility: CLINIC | Age: 68
End: 2018-09-13

## 2018-09-13 DIAGNOSIS — I10 ESSENTIAL HYPERTENSION: ICD-10-CM

## 2018-09-13 DIAGNOSIS — N18.9 CKD (CHRONIC KIDNEY DISEASE): ICD-10-CM

## 2018-09-13 DIAGNOSIS — R13.10 DYSPHAGIA: ICD-10-CM

## 2018-09-13 DIAGNOSIS — Z86.73 HISTORY OF STROKE: ICD-10-CM

## 2018-09-13 DIAGNOSIS — D64.9 ANEMIA, UNSPECIFIED TYPE: ICD-10-CM

## 2018-09-20 ENCOUNTER — OFFICE VISIT - HEALTHEAST (OUTPATIENT)
Dept: GERIATRICS | Facility: CLINIC | Age: 68
End: 2018-09-20

## 2018-09-20 DIAGNOSIS — R13.10 DYSPHAGIA: ICD-10-CM

## 2018-09-20 DIAGNOSIS — I10 ESSENTIAL HYPERTENSION: ICD-10-CM

## 2018-09-20 DIAGNOSIS — Z86.73 HISTORY OF STROKE: ICD-10-CM

## 2018-09-20 DIAGNOSIS — N18.9 CKD (CHRONIC KIDNEY DISEASE): ICD-10-CM

## 2018-09-20 DIAGNOSIS — D64.9 ANEMIA: ICD-10-CM

## 2018-10-02 ENCOUNTER — OFFICE VISIT - HEALTHEAST (OUTPATIENT)
Dept: GERIATRICS | Facility: CLINIC | Age: 68
End: 2018-10-02

## 2018-10-02 DIAGNOSIS — I10 ESSENTIAL HYPERTENSION: ICD-10-CM

## 2018-10-02 DIAGNOSIS — N18.9 CKD (CHRONIC KIDNEY DISEASE): ICD-10-CM

## 2018-10-02 DIAGNOSIS — Z86.73 HISTORY OF STROKE: ICD-10-CM

## 2018-10-02 DIAGNOSIS — D64.9 ANEMIA: ICD-10-CM

## 2018-10-02 DIAGNOSIS — R13.10 DYSPHAGIA: ICD-10-CM

## 2018-10-04 ENCOUNTER — OFFICE VISIT - HEALTHEAST (OUTPATIENT)
Dept: GERIATRICS | Facility: CLINIC | Age: 68
End: 2018-10-04

## 2018-10-04 DIAGNOSIS — R13.10 DYSPHAGIA: ICD-10-CM

## 2018-10-04 DIAGNOSIS — N18.9 CKD (CHRONIC KIDNEY DISEASE): ICD-10-CM

## 2018-10-04 DIAGNOSIS — D64.9 ANEMIA: ICD-10-CM

## 2018-10-04 DIAGNOSIS — I63.9 STROKE (H): ICD-10-CM

## 2018-10-04 DIAGNOSIS — I10 ESSENTIAL HYPERTENSION: ICD-10-CM

## 2018-10-09 ENCOUNTER — RECORDS - HEALTHEAST (OUTPATIENT)
Dept: ADMINISTRATIVE | Facility: OTHER | Age: 68
End: 2018-10-09

## 2018-10-10 ENCOUNTER — OFFICE VISIT - HEALTHEAST (OUTPATIENT)
Dept: GERIATRICS | Facility: CLINIC | Age: 68
End: 2018-10-10

## 2018-10-10 DIAGNOSIS — J69.0 ASPIRATION PNEUMONIA, UNSPECIFIED ASPIRATION PNEUMONIA TYPE, UNSPECIFIED LATERALITY, UNSPECIFIED PART OF LUNG (H): ICD-10-CM

## 2018-10-10 DIAGNOSIS — N18.30 CHRONIC KIDNEY DISEASE, STAGE III (MODERATE) (H): ICD-10-CM

## 2018-10-10 DIAGNOSIS — K21.9 GASTRO-ESOPHAGEAL REFLUX DISEASE WITHOUT ESOPHAGITIS: ICD-10-CM

## 2018-10-10 DIAGNOSIS — I10 ESSENTIAL HYPERTENSION, BENIGN: ICD-10-CM

## 2018-10-12 ENCOUNTER — HOSPITAL ENCOUNTER (OUTPATIENT)
Dept: RADIOLOGY | Facility: HOSPITAL | Age: 68
Discharge: HOME | End: 2018-10-12
Attending: FAMILY MEDICINE

## 2018-10-12 DIAGNOSIS — R13.12 OROPHARYNGEAL DYSPHAGIA: ICD-10-CM

## 2018-10-12 DIAGNOSIS — Z86.73 HISTORY OF STROKE: ICD-10-CM

## 2018-10-16 ENCOUNTER — OFFICE VISIT - HEALTHEAST (OUTPATIENT)
Dept: GERIATRICS | Facility: CLINIC | Age: 68
End: 2018-10-16

## 2018-10-16 DIAGNOSIS — N18.9 CKD (CHRONIC KIDNEY DISEASE): ICD-10-CM

## 2018-10-16 DIAGNOSIS — I63.9 STROKE (H): ICD-10-CM

## 2018-10-16 DIAGNOSIS — I10 ESSENTIAL HYPERTENSION: ICD-10-CM

## 2018-10-16 DIAGNOSIS — D64.9 ANEMIA: ICD-10-CM

## 2018-10-16 DIAGNOSIS — R13.10 DYSPHAGIA: ICD-10-CM

## 2018-10-23 ENCOUNTER — COMMUNICATION - HEALTHEAST (OUTPATIENT)
Dept: FAMILY MEDICINE | Facility: CLINIC | Age: 68
End: 2018-10-23

## 2018-10-23 ENCOUNTER — OFFICE VISIT - HEALTHEAST (OUTPATIENT)
Dept: GERIATRICS | Facility: CLINIC | Age: 68
End: 2018-10-23

## 2018-10-23 DIAGNOSIS — D64.9 ANEMIA, UNSPECIFIED TYPE: ICD-10-CM

## 2018-10-23 DIAGNOSIS — T17.908A ASPIRATION INTO AIRWAY: ICD-10-CM

## 2018-10-23 DIAGNOSIS — R13.10 DYSPHAGIA: ICD-10-CM

## 2018-10-23 DIAGNOSIS — I10 ESSENTIAL HYPERTENSION: ICD-10-CM

## 2018-10-23 DIAGNOSIS — N18.9 CKD (CHRONIC KIDNEY DISEASE): ICD-10-CM

## 2018-10-23 DIAGNOSIS — Z86.73 HISTORY OF STROKE: ICD-10-CM

## 2018-10-26 ENCOUNTER — AMBULATORY - HEALTHEAST (OUTPATIENT)
Dept: GERIATRICS | Facility: CLINIC | Age: 68
End: 2018-10-26

## 2018-10-26 ENCOUNTER — COMMUNICATION - HEALTHEAST (OUTPATIENT)
Dept: GERIATRICS | Facility: CLINIC | Age: 68
End: 2018-10-26

## 2018-10-29 ENCOUNTER — COMMUNICATION - HEALTHEAST (OUTPATIENT)
Dept: FAMILY MEDICINE | Facility: CLINIC | Age: 68
End: 2018-10-29

## 2018-10-30 ENCOUNTER — RECORDS - HEALTHEAST (OUTPATIENT)
Dept: LAB | Facility: CLINIC | Age: 68
End: 2018-10-30

## 2018-10-30 LAB
BASOPHILS # BLD AUTO: 0 THOU/UL (ref 0–0.2)
BASOPHILS NFR BLD AUTO: 1 % (ref 0–2)
EOSINOPHIL # BLD AUTO: 0.2 THOU/UL (ref 0–0.4)
EOSINOPHIL NFR BLD AUTO: 2 % (ref 0–6)
ERYTHROCYTE [DISTWIDTH] IN BLOOD BY AUTOMATED COUNT: 15.6 % (ref 11–14.5)
FLUAV AG SPEC QL IA: NORMAL
FLUBV AG SPEC QL IA: NORMAL
HCT VFR BLD AUTO: 34.8 % (ref 35–47)
HGB BLD-MCNC: 10.8 G/DL (ref 12–16)
LYMPHOCYTES # BLD AUTO: 1.1 THOU/UL (ref 0.8–4.4)
LYMPHOCYTES NFR BLD AUTO: 13 % (ref 20–40)
MCH RBC QN AUTO: 25.7 PG (ref 27–34)
MCHC RBC AUTO-ENTMCNC: 31 G/DL (ref 32–36)
MCV RBC AUTO: 83 FL (ref 80–100)
MONOCYTES # BLD AUTO: 1.1 THOU/UL (ref 0–0.9)
MONOCYTES NFR BLD AUTO: 13 % (ref 2–10)
NEUTROPHILS # BLD AUTO: 6.1 THOU/UL (ref 2–7.7)
NEUTROPHILS NFR BLD AUTO: 72 % (ref 50–70)
PLATELET # BLD AUTO: 194 THOU/UL (ref 140–440)
PMV BLD AUTO: 12.3 FL (ref 8.5–12.5)
RBC # BLD AUTO: 4.2 MILL/UL (ref 3.8–5.4)
WBC: 8.5 THOU/UL (ref 4–11)

## 2018-11-27 ENCOUNTER — RECORDS - HEALTHEAST (OUTPATIENT)
Dept: LAB | Facility: CLINIC | Age: 68
End: 2018-11-27

## 2018-11-27 ENCOUNTER — RECORDS - HEALTHEAST (OUTPATIENT)
Dept: ADMINISTRATIVE | Facility: OTHER | Age: 68
End: 2018-11-27

## 2018-11-28 LAB — ALBUMIN SERPL-MCNC: 3.7 G/DL (ref 3.5–5)

## 2018-12-05 ENCOUNTER — HOSPITAL ENCOUNTER (OUTPATIENT)
Dept: RADIOLOGY | Facility: HOSPITAL | Age: 68
Discharge: HOME | End: 2018-12-05

## 2018-12-05 DIAGNOSIS — R05.9 COUGH: ICD-10-CM

## 2018-12-05 DIAGNOSIS — R13.12 OROPHARYNGEAL DYSPHAGIA: ICD-10-CM

## 2018-12-05 DIAGNOSIS — K21.00 GERD WITH ESOPHAGITIS: ICD-10-CM

## 2019-01-17 ENCOUNTER — HOSPITAL ENCOUNTER (OUTPATIENT)
Dept: INTERVENTIONAL RADIOLOGY/VASCULAR | Facility: HOSPITAL | Age: 69
Discharge: HOME | End: 2019-01-17
Attending: NURSE PRACTITIONER | Admitting: RADIOLOGY

## 2019-01-17 DIAGNOSIS — E46 MALNUTRITION, UNSPECIFIED TYPE (H): ICD-10-CM

## 2019-04-17 ENCOUNTER — RECORDS - HEALTHEAST (OUTPATIENT)
Dept: ADMINISTRATIVE | Facility: OTHER | Age: 69
End: 2019-04-17

## 2019-04-18 ENCOUNTER — HOSPITAL ENCOUNTER (OUTPATIENT)
Dept: INTERVENTIONAL RADIOLOGY/VASCULAR | Facility: HOSPITAL | Age: 69
Discharge: HOME | End: 2019-04-18
Attending: RADIOLOGY | Admitting: RADIOLOGY

## 2019-04-18 DIAGNOSIS — R13.10 DYSPHAGIA: ICD-10-CM

## 2019-04-24 ENCOUNTER — RECORDS - HEALTHEAST (OUTPATIENT)
Dept: ADMINISTRATIVE | Facility: OTHER | Age: 69
End: 2019-04-24

## 2019-05-06 ENCOUNTER — HOSPITAL ENCOUNTER (OUTPATIENT)
Dept: RADIOLOGY | Facility: HOSPITAL | Age: 69
Discharge: HOME | End: 2019-05-06

## 2019-05-06 DIAGNOSIS — R13.12 OROPHARYNGEAL DYSPHAGIA: ICD-10-CM

## 2019-05-07 ENCOUNTER — RECORDS - HEALTHEAST (OUTPATIENT)
Dept: LAB | Facility: CLINIC | Age: 69
End: 2019-05-07

## 2019-05-08 LAB
ANION GAP SERPL CALCULATED.3IONS-SCNC: 9 MMOL/L (ref 5–18)
BUN SERPL-MCNC: 36 MG/DL (ref 8–28)
CALCIUM SERPL-MCNC: 9.7 MG/DL (ref 8.5–10.5)
CHLORIDE BLD-SCNC: 106 MMOL/L (ref 98–107)
CO2 SERPL-SCNC: 26 MMOL/L (ref 22–31)
CREAT SERPL-MCNC: 1.31 MG/DL (ref 0.6–1.1)
ERYTHROCYTE [DISTWIDTH] IN BLOOD BY AUTOMATED COUNT: 15.7 % (ref 11–14.5)
FERRITIN SERPL-MCNC: 17 NG/ML (ref 10–130)
GFR SERPL CREATININE-BSD FRML MDRD: 40 ML/MIN/1.73M2
GLUCOSE BLD-MCNC: 89 MG/DL (ref 70–125)
HCT VFR BLD AUTO: 39.1 % (ref 35–47)
HGB BLD-MCNC: 12.1 G/DL (ref 12–16)
IRON SATN MFR SERPL: 9 % (ref 20–50)
IRON SERPL-MCNC: 33 UG/DL (ref 42–175)
MAGNESIUM SERPL-MCNC: 2 MG/DL (ref 1.8–2.6)
MCH RBC QN AUTO: 24.5 PG (ref 27–34)
MCHC RBC AUTO-ENTMCNC: 30.9 G/DL (ref 32–36)
MCV RBC AUTO: 79 FL (ref 80–100)
PLATELET # BLD AUTO: 260 THOU/UL (ref 140–440)
PMV BLD AUTO: 12 FL (ref 8.5–12.5)
POTASSIUM BLD-SCNC: 4.8 MMOL/L (ref 3.5–5)
RBC # BLD AUTO: 4.93 MILL/UL (ref 3.8–5.4)
SODIUM SERPL-SCNC: 141 MMOL/L (ref 136–145)
TIBC SERPL-MCNC: 374 UG/DL (ref 313–563)
TRANSFERRIN SERPL-MCNC: 299 MG/DL (ref 212–360)
WBC: 10.3 THOU/UL (ref 4–11)

## 2019-05-09 LAB — 25(OH)D3 SERPL-MCNC: 60.8 NG/ML (ref 30–80)

## 2019-08-09 ENCOUNTER — AMBULATORY - HEALTHEAST (OUTPATIENT)
Dept: SCHEDULING | Facility: CLINIC | Age: 69
End: 2019-08-09

## 2019-08-09 DIAGNOSIS — Z93.1 G TUBE FEEDINGS (H): ICD-10-CM

## 2019-08-09 DIAGNOSIS — R13.10 DYSPHAGIA: ICD-10-CM

## 2019-08-14 ENCOUNTER — HOSPITAL ENCOUNTER (OUTPATIENT)
Dept: RADIOLOGY | Facility: HOSPITAL | Age: 69
Discharge: HOME | End: 2019-08-14
Attending: INTERNAL MEDICINE | Admitting: RADIOLOGY

## 2019-08-14 DIAGNOSIS — Z93.1 G TUBE FEEDINGS (H): ICD-10-CM

## 2019-08-14 DIAGNOSIS — R13.10 DYSPHAGIA: ICD-10-CM

## 2019-11-01 ENCOUNTER — RECORDS - HEALTHEAST (OUTPATIENT)
Dept: LAB | Facility: CLINIC | Age: 69
End: 2019-11-01

## 2019-11-01 LAB
ALBUMIN SERPL-MCNC: 3.9 G/DL (ref 3.5–5)
ALP SERPL-CCNC: 90 U/L (ref 45–120)
ALT SERPL W P-5'-P-CCNC: 33 U/L (ref 0–45)
ANION GAP SERPL CALCULATED.3IONS-SCNC: 8 MMOL/L (ref 5–18)
AST SERPL W P-5'-P-CCNC: 28 U/L (ref 0–40)
BILIRUB SERPL-MCNC: 0.8 MG/DL (ref 0–1)
BUN SERPL-MCNC: 33 MG/DL (ref 8–28)
CALCIUM SERPL-MCNC: 9.5 MG/DL (ref 8.5–10.5)
CHLORIDE BLD-SCNC: 104 MMOL/L (ref 98–107)
CO2 SERPL-SCNC: 29 MMOL/L (ref 22–31)
CREAT SERPL-MCNC: 1.25 MG/DL (ref 0.6–1.1)
ERYTHROCYTE [DISTWIDTH] IN BLOOD BY AUTOMATED COUNT: 16.4 % (ref 11–14.5)
FERRITIN SERPL-MCNC: 16 NG/ML (ref 10–130)
GFR SERPL CREATININE-BSD FRML MDRD: 42 ML/MIN/1.73M2
GLUCOSE BLD-MCNC: 86 MG/DL (ref 70–125)
HCT VFR BLD AUTO: 40.2 % (ref 35–47)
HGB BLD-MCNC: 12.4 G/DL (ref 12–16)
IRON SATN MFR SERPL: 13 % (ref 20–50)
IRON SERPL-MCNC: 48 UG/DL (ref 42–175)
MCH RBC QN AUTO: 24.1 PG (ref 27–34)
MCHC RBC AUTO-ENTMCNC: 30.8 G/DL (ref 32–36)
MCV RBC AUTO: 78 FL (ref 80–100)
PHOSPHATE SERPL-MCNC: 3.7 MG/DL (ref 2.5–4.5)
PLATELET # BLD AUTO: 245 THOU/UL (ref 140–440)
PMV BLD AUTO: 12.3 FL (ref 8.5–12.5)
POTASSIUM BLD-SCNC: 5.2 MMOL/L (ref 3.5–5)
PROT SERPL-MCNC: 7.1 G/DL (ref 6–8)
RBC # BLD AUTO: 5.14 MILL/UL (ref 3.8–5.4)
SODIUM SERPL-SCNC: 141 MMOL/L (ref 136–145)
TIBC SERPL-MCNC: 357 UG/DL (ref 313–563)
TRANSFERRIN SERPL-MCNC: 285 MG/DL (ref 212–360)
WBC: 8.7 THOU/UL (ref 4–11)

## 2019-11-06 ENCOUNTER — AMBULATORY - HEALTHEAST (OUTPATIENT)
Dept: SCHEDULING | Facility: CLINIC | Age: 69
End: 2019-11-06

## 2019-11-06 DIAGNOSIS — Z93.1 G TUBE FEEDINGS (H): ICD-10-CM

## 2019-11-13 ENCOUNTER — HOSPITAL ENCOUNTER (OUTPATIENT)
Dept: RADIOLOGY | Facility: HOSPITAL | Age: 69
Discharge: HOME | End: 2019-11-13
Admitting: RADIOLOGY

## 2019-11-13 DIAGNOSIS — Z93.1 G TUBE FEEDINGS (H): ICD-10-CM

## 2019-11-21 ENCOUNTER — RECORDS - HEALTHEAST (OUTPATIENT)
Dept: LAB | Facility: CLINIC | Age: 69
End: 2019-11-21

## 2019-11-21 LAB
ANION GAP SERPL CALCULATED.3IONS-SCNC: 10 MMOL/L (ref 5–18)
BUN SERPL-MCNC: 34 MG/DL (ref 8–28)
CALCIUM SERPL-MCNC: 9.3 MG/DL (ref 8.5–10.5)
CHLORIDE BLD-SCNC: 106 MMOL/L (ref 98–107)
CO2 SERPL-SCNC: 25 MMOL/L (ref 22–31)
CREAT SERPL-MCNC: 1.15 MG/DL (ref 0.6–1.1)
GFR SERPL CREATININE-BSD FRML MDRD: 47 ML/MIN/1.73M2
GLUCOSE BLD-MCNC: 82 MG/DL (ref 70–125)
POTASSIUM BLD-SCNC: 5 MMOL/L (ref 3.5–5)
SODIUM SERPL-SCNC: 141 MMOL/L (ref 136–145)

## 2020-01-23 ENCOUNTER — HOSPITAL ENCOUNTER (OUTPATIENT)
Dept: RADIOLOGY | Facility: HOSPITAL | Age: 70
Discharge: HOME | End: 2020-01-23
Admitting: RADIOLOGY

## 2020-01-23 DIAGNOSIS — Z93.1 G TUBE FEEDINGS (H): ICD-10-CM

## 2020-02-27 ENCOUNTER — RECORDS - HEALTHEAST (OUTPATIENT)
Dept: ADMINISTRATIVE | Facility: OTHER | Age: 70
End: 2020-02-27

## 2020-03-04 ENCOUNTER — COMMUNICATION - HEALTHEAST (OUTPATIENT)
Dept: SCHEDULING | Facility: CLINIC | Age: 70
End: 2020-03-04

## 2020-03-12 ENCOUNTER — OFFICE VISIT - HEALTHEAST (OUTPATIENT)
Dept: FAMILY MEDICINE | Facility: CLINIC | Age: 70
End: 2020-03-12

## 2020-03-12 ENCOUNTER — COMMUNICATION - HEALTHEAST (OUTPATIENT)
Dept: SCHEDULING | Facility: CLINIC | Age: 70
End: 2020-03-12

## 2020-03-12 DIAGNOSIS — R13.12 OROPHARYNGEAL DYSPHAGIA: ICD-10-CM

## 2020-03-12 DIAGNOSIS — I69.354 HEMIPLEGIA AND HEMIPARESIS FOLLOWING CEREBRAL INFARCTION AFFECTING LEFT NON-DOMINANT SIDE (H): ICD-10-CM

## 2020-03-12 DIAGNOSIS — E46 MALNUTRITION, UNSPECIFIED TYPE (H): ICD-10-CM

## 2020-03-12 DIAGNOSIS — Z76.89 ESTABLISHING CARE WITH NEW DOCTOR, ENCOUNTER FOR: ICD-10-CM

## 2020-03-18 ENCOUNTER — RECORDS - HEALTHEAST (OUTPATIENT)
Dept: ADMINISTRATIVE | Facility: OTHER | Age: 70
End: 2020-03-18

## 2020-04-27 ENCOUNTER — RECORDS - HEALTHEAST (OUTPATIENT)
Dept: ADMINISTRATIVE | Facility: OTHER | Age: 70
End: 2020-04-27

## 2020-04-29 ENCOUNTER — HOSPITAL ENCOUNTER (OUTPATIENT)
Dept: RADIOLOGY | Facility: HOSPITAL | Age: 70
Discharge: HOME | End: 2020-04-29
Admitting: RADIOLOGY

## 2020-04-29 DIAGNOSIS — Z93.1 G TUBE FEEDINGS (H): ICD-10-CM

## 2020-05-18 ENCOUNTER — RECORDS - HEALTHEAST (OUTPATIENT)
Dept: ADMINISTRATIVE | Facility: OTHER | Age: 70
End: 2020-05-18

## 2020-06-22 ENCOUNTER — RECORDS - HEALTHEAST (OUTPATIENT)
Dept: ADMINISTRATIVE | Facility: OTHER | Age: 70
End: 2020-06-22

## 2020-06-24 ENCOUNTER — COMMUNICATION - HEALTHEAST (OUTPATIENT)
Dept: FAMILY MEDICINE | Facility: CLINIC | Age: 70
End: 2020-06-24

## 2020-06-25 ENCOUNTER — COMMUNICATION - HEALTHEAST (OUTPATIENT)
Dept: CARE COORDINATION | Facility: CLINIC | Age: 70
End: 2020-06-25

## 2020-06-26 ENCOUNTER — AMBULATORY - HEALTHEAST (OUTPATIENT)
Dept: CARE COORDINATION | Facility: CLINIC | Age: 70
End: 2020-06-26

## 2020-06-26 DIAGNOSIS — R13.10 DYSPHAGIA: ICD-10-CM

## 2020-06-26 DIAGNOSIS — J69.0 ASPIRATION PNEUMONIA, UNSPECIFIED ASPIRATION PNEUMONIA TYPE, UNSPECIFIED LATERALITY, UNSPECIFIED PART OF LUNG (H): ICD-10-CM

## 2020-06-30 ENCOUNTER — OFFICE VISIT - HEALTHEAST (OUTPATIENT)
Dept: FAMILY MEDICINE | Facility: CLINIC | Age: 70
End: 2020-06-30

## 2020-06-30 DIAGNOSIS — I10 ESSENTIAL HYPERTENSION, BENIGN: ICD-10-CM

## 2020-06-30 DIAGNOSIS — I69.322 CVA, OLD, DYSARTHRIA: ICD-10-CM

## 2020-06-30 DIAGNOSIS — I69.922 DYSARTHRIA AS LATE EFFECT OF CEREBROVASCULAR DISEASE: ICD-10-CM

## 2020-06-30 DIAGNOSIS — R32 URINARY INCONTINENCE, UNSPECIFIED TYPE: ICD-10-CM

## 2020-06-30 DIAGNOSIS — J69.0 ASPIRATION PNEUMONIA, UNSPECIFIED ASPIRATION PNEUMONIA TYPE, UNSPECIFIED LATERALITY, UNSPECIFIED PART OF LUNG (H): ICD-10-CM

## 2020-06-30 DIAGNOSIS — R26.9 ABNORMALITY OF GAIT: ICD-10-CM

## 2020-06-30 DIAGNOSIS — R13.12 OROPHARYNGEAL DYSPHAGIA: ICD-10-CM

## 2020-07-01 ENCOUNTER — HOME CARE/HOSPICE - HEALTHEAST (OUTPATIENT)
Dept: HOME HEALTH SERVICES | Facility: HOME HEALTH | Age: 70
End: 2020-07-01

## 2020-07-01 ENCOUNTER — COMMUNICATION - HEALTHEAST (OUTPATIENT)
Dept: NURSING | Facility: CLINIC | Age: 70
End: 2020-07-01

## 2020-07-05 ENCOUNTER — COMMUNICATION - HEALTHEAST (OUTPATIENT)
Dept: HOME HEALTH SERVICES | Facility: HOME HEALTH | Age: 70
End: 2020-07-05

## 2020-07-07 ENCOUNTER — COMMUNICATION - HEALTHEAST (OUTPATIENT)
Dept: CARE COORDINATION | Facility: CLINIC | Age: 70
End: 2020-07-07

## 2020-07-07 ENCOUNTER — COMMUNICATION - HEALTHEAST (OUTPATIENT)
Dept: FAMILY MEDICINE | Facility: CLINIC | Age: 70
End: 2020-07-07

## 2020-07-09 ENCOUNTER — COMMUNICATION - HEALTHEAST (OUTPATIENT)
Dept: FAMILY MEDICINE | Facility: CLINIC | Age: 70
End: 2020-07-09

## 2020-07-09 ENCOUNTER — COMMUNICATION - HEALTHEAST (OUTPATIENT)
Dept: NURSING | Facility: CLINIC | Age: 70
End: 2020-07-09

## 2020-07-13 ENCOUNTER — COMMUNICATION - HEALTHEAST (OUTPATIENT)
Dept: FAMILY MEDICINE | Facility: CLINIC | Age: 70
End: 2020-07-13

## 2020-07-16 ENCOUNTER — COMMUNICATION - HEALTHEAST (OUTPATIENT)
Dept: FAMILY MEDICINE | Facility: CLINIC | Age: 70
End: 2020-07-16

## 2020-07-16 DIAGNOSIS — B37.89 CANDIDA RASH OF GROIN: ICD-10-CM

## 2020-07-16 RX ORDER — NYSTATIN 100000/G
POWDER (GRAM) TOPICAL
Qty: 15 G | Refills: 0 | Status: SHIPPED | OUTPATIENT
Start: 2020-07-16 | End: 2021-07-16

## 2020-07-21 ENCOUNTER — COMMUNICATION - HEALTHEAST (OUTPATIENT)
Dept: NURSING | Facility: CLINIC | Age: 70
End: 2020-07-21

## 2020-07-21 ENCOUNTER — OFFICE VISIT - HEALTHEAST (OUTPATIENT)
Dept: FAMILY MEDICINE | Facility: CLINIC | Age: 70
End: 2020-07-21

## 2020-07-21 ENCOUNTER — COMMUNICATION - HEALTHEAST (OUTPATIENT)
Dept: FAMILY MEDICINE | Facility: CLINIC | Age: 70
End: 2020-07-21

## 2020-07-21 ENCOUNTER — COMMUNICATION - HEALTHEAST (OUTPATIENT)
Dept: CARE COORDINATION | Facility: CLINIC | Age: 70
End: 2020-07-21

## 2020-07-21 DIAGNOSIS — R41.89 SELF-CARE DEFICIT FOR MEDICATION ADMINISTRATION: ICD-10-CM

## 2020-07-21 DIAGNOSIS — Z91.148 CONFLICTED ATTITUDE TOWARDS MEDICATION MANAGEMENT: ICD-10-CM
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
## 2020-07-21 DIAGNOSIS — R23.4 INDURATION, SKIN: ICD-10-CM

## 2020-07-21 LAB
BASOPHILS # BLD AUTO: 0.1 THOU/UL (ref 0–0.2)
BASOPHILS NFR BLD AUTO: 1 % (ref 0–2)
EOSINOPHIL # BLD AUTO: 0.3 THOU/UL (ref 0–0.4)
EOSINOPHIL NFR BLD AUTO: 2 % (ref 0–6)
ERYTHROCYTE [DISTWIDTH] IN BLOOD BY AUTOMATED COUNT: 15.1 % (ref 11–14.5)
HCT VFR BLD AUTO: 41.7 % (ref 35–47)
HGB BLD-MCNC: 12.8 G/DL (ref 12–16)
LYMPHOCYTES # BLD AUTO: 1.5 THOU/UL (ref 0.8–4.4)
LYMPHOCYTES NFR BLD AUTO: 14 % (ref 20–40)
MCH RBC QN AUTO: 24.9 PG (ref 27–34)
MCHC RBC AUTO-ENTMCNC: 30.7 G/DL (ref 32–36)
MCV RBC AUTO: 81 FL (ref 80–100)
MONOCYTES # BLD AUTO: 0.8 THOU/UL (ref 0–0.9)
MONOCYTES NFR BLD AUTO: 7 % (ref 2–10)
NEUTROPHILS # BLD AUTO: 8 THOU/UL (ref 2–7.7)
NEUTROPHILS NFR BLD AUTO: 75 % (ref 50–70)
PLATELET # BLD AUTO: 258 THOU/UL (ref 140–440)
PMV BLD AUTO: 12.6 FL (ref 8.5–12.5)
RBC # BLD AUTO: 5.15 MILL/UL (ref 3.8–5.4)
WBC: 10.7 THOU/UL (ref 4–11)

## 2020-07-28 ENCOUNTER — COMMUNICATION - HEALTHEAST (OUTPATIENT)
Dept: NURSING | Facility: CLINIC | Age: 70
End: 2020-07-28

## 2020-07-28 ENCOUNTER — COMMUNICATION - HEALTHEAST (OUTPATIENT)
Dept: CARE COORDINATION | Facility: CLINIC | Age: 70
End: 2020-07-28

## 2020-07-28 DIAGNOSIS — F06.30 MOOD DISORDER IN CONDITIONS CLASSIFIED ELSEWHERE: ICD-10-CM

## 2020-07-28 DIAGNOSIS — I10 ESSENTIAL HYPERTENSION, BENIGN: ICD-10-CM

## 2020-07-28 ASSESSMENT — ACTIVITIES OF DAILY LIVING (ADL): DEPENDENT_IADLS:: TRANSPORTATION;MEDICATION MANAGEMENT

## 2020-07-29 ENCOUNTER — COMMUNICATION - HEALTHEAST (OUTPATIENT)
Dept: CARE COORDINATION | Facility: CLINIC | Age: 70
End: 2020-07-29

## 2020-07-29 ENCOUNTER — COMMUNICATION - HEALTHEAST (OUTPATIENT)
Dept: FAMILY MEDICINE | Facility: CLINIC | Age: 70
End: 2020-07-29

## 2020-07-29 DIAGNOSIS — I10 ESSENTIAL HYPERTENSION, BENIGN: ICD-10-CM

## 2020-07-29 DIAGNOSIS — F06.30 MOOD DISORDER IN CONDITIONS CLASSIFIED ELSEWHERE: ICD-10-CM

## 2020-07-30 ENCOUNTER — COMMUNICATION - HEALTHEAST (OUTPATIENT)
Dept: FAMILY MEDICINE | Facility: CLINIC | Age: 70
End: 2020-07-30

## 2020-07-30 ENCOUNTER — HOSPITAL ENCOUNTER (OUTPATIENT)
Dept: RADIOLOGY | Facility: HOSPITAL | Age: 70
Discharge: HOME | End: 2020-07-30
Admitting: RADIOLOGY

## 2020-07-30 ENCOUNTER — COMMUNICATION - HEALTHEAST (OUTPATIENT)
Dept: CARE COORDINATION | Facility: CLINIC | Age: 70
End: 2020-07-30

## 2020-07-30 ENCOUNTER — OFFICE VISIT - HEALTHEAST (OUTPATIENT)
Dept: FAMILY MEDICINE | Facility: CLINIC | Age: 70
End: 2020-07-30

## 2020-07-30 DIAGNOSIS — R13.10 DYSPHAGIA, UNSPECIFIED TYPE: ICD-10-CM

## 2020-07-30 DIAGNOSIS — Z93.1 GASTROSTOMY PRESENT (H): ICD-10-CM

## 2020-07-30 DIAGNOSIS — Z93.1 G TUBE FEEDINGS (H): ICD-10-CM
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
## 2020-07-31 ENCOUNTER — COMMUNICATION - HEALTHEAST (OUTPATIENT)
Dept: FAMILY MEDICINE | Facility: CLINIC | Age: 70
End: 2020-07-31

## 2020-07-31 ENCOUNTER — COMMUNICATION - HEALTHEAST (OUTPATIENT)
Dept: CARE COORDINATION | Facility: CLINIC | Age: 70
End: 2020-07-31

## 2020-07-31 DIAGNOSIS — F06.30 MOOD DISORDER IN CONDITIONS CLASSIFIED ELSEWHERE: ICD-10-CM

## 2020-07-31 DIAGNOSIS — I10 ESSENTIAL HYPERTENSION, BENIGN: ICD-10-CM

## 2020-08-05 ENCOUNTER — COMMUNICATION - HEALTHEAST (OUTPATIENT)
Dept: NURSING | Facility: CLINIC | Age: 70
End: 2020-08-05

## 2020-08-06 ENCOUNTER — COMMUNICATION - HEALTHEAST (OUTPATIENT)
Dept: NURSING | Facility: CLINIC | Age: 70
End: 2020-08-06

## 2020-08-11 ENCOUNTER — RECORDS - HEALTHEAST (OUTPATIENT)
Dept: ADMINISTRATIVE | Facility: OTHER | Age: 70
End: 2020-08-11

## 2020-08-12 ENCOUNTER — COMMUNICATION - HEALTHEAST (OUTPATIENT)
Dept: FAMILY MEDICINE | Facility: CLINIC | Age: 70
End: 2020-08-12

## 2020-08-12 DIAGNOSIS — K21.00 GASTROESOPHAGEAL REFLUX DISEASE WITH ESOPHAGITIS: ICD-10-CM

## 2020-08-13 ENCOUNTER — COMMUNICATION - HEALTHEAST (OUTPATIENT)
Dept: FAMILY MEDICINE | Facility: CLINIC | Age: 70
End: 2020-08-13

## 2020-08-13 DIAGNOSIS — K20.90 ESOPHAGITIS: ICD-10-CM

## 2020-08-14 ENCOUNTER — COMMUNICATION - HEALTHEAST (OUTPATIENT)
Dept: FAMILY MEDICINE | Facility: CLINIC | Age: 70
End: 2020-08-14

## 2020-08-14 DIAGNOSIS — K21.00 GASTROESOPHAGEAL REFLUX DISEASE WITH ESOPHAGITIS: ICD-10-CM

## 2020-08-19 ENCOUNTER — COMMUNICATION - HEALTHEAST (OUTPATIENT)
Dept: CARE COORDINATION | Facility: CLINIC | Age: 70
End: 2020-08-19

## 2020-08-19 ENCOUNTER — COMMUNICATION - HEALTHEAST (OUTPATIENT)
Dept: NURSING | Facility: CLINIC | Age: 70
End: 2020-08-19

## 2020-08-19 DIAGNOSIS — K20.90 ESOPHAGITIS: ICD-10-CM

## 2020-08-19 DIAGNOSIS — K21.00 GASTRO-ESOPHAGEAL REFLUX DISEASE WITH ESOPHAGITIS: ICD-10-CM

## 2020-08-20 ENCOUNTER — COMMUNICATION - HEALTHEAST (OUTPATIENT)
Dept: SCHEDULING | Facility: CLINIC | Age: 70
End: 2020-08-20

## 2020-08-26 ENCOUNTER — COMMUNICATION - HEALTHEAST (OUTPATIENT)
Dept: FAMILY MEDICINE | Facility: CLINIC | Age: 70
End: 2020-08-26

## 2020-08-26 DIAGNOSIS — E46 MALNUTRITION, UNSPECIFIED TYPE (H): ICD-10-CM

## 2020-08-26 DIAGNOSIS — R13.12 OROPHARYNGEAL DYSPHAGIA: ICD-10-CM

## 2020-09-01 ENCOUNTER — OFFICE VISIT - HEALTHEAST (OUTPATIENT)
Dept: FAMILY MEDICINE | Facility: CLINIC | Age: 70
End: 2020-09-01

## 2020-09-01 ENCOUNTER — COMMUNICATION - HEALTHEAST (OUTPATIENT)
Dept: PHARMACY | Facility: CLINIC | Age: 70
End: 2020-09-01

## 2020-09-01 DIAGNOSIS — K20.90 ESOPHAGITIS: ICD-10-CM

## 2020-09-01 DIAGNOSIS — K21.00 GASTRO-ESOPHAGEAL REFLUX DISEASE WITH ESOPHAGITIS: ICD-10-CM

## 2020-09-01 DIAGNOSIS — Z23 ENCOUNTER FOR IMMUNIZATION: ICD-10-CM

## 2020-09-01 DIAGNOSIS — Z93.1 GASTROSTOMY PRESENT (H): ICD-10-CM

## 2020-09-08 ENCOUNTER — COMMUNICATION - HEALTHEAST (OUTPATIENT)
Dept: FAMILY MEDICINE | Facility: CLINIC | Age: 70
End: 2020-09-08

## 2020-09-08 ENCOUNTER — COMMUNICATION - HEALTHEAST (OUTPATIENT)
Dept: SCHEDULING | Facility: CLINIC | Age: 70
End: 2020-09-08

## 2020-09-08 DIAGNOSIS — K20.90 ESOPHAGITIS: ICD-10-CM

## 2020-10-01 ENCOUNTER — COMMUNICATION - HEALTHEAST (OUTPATIENT)
Dept: NURSING | Facility: CLINIC | Age: 70
End: 2020-10-01

## 2020-10-14 ENCOUNTER — AMBULATORY - HEALTHEAST (OUTPATIENT)
Dept: SCHEDULING | Facility: CLINIC | Age: 70
End: 2020-10-14

## 2020-10-14 DIAGNOSIS — R63.30 FEEDING DIFFICULTIES: ICD-10-CM

## 2020-10-26 ENCOUNTER — COMMUNICATION - HEALTHEAST (OUTPATIENT)
Dept: FAMILY MEDICINE | Facility: CLINIC | Age: 70
End: 2020-10-26

## 2020-10-26 ENCOUNTER — HOSPITAL ENCOUNTER (OUTPATIENT)
Dept: RADIOLOGY | Facility: HOSPITAL | Age: 70
Discharge: HOME | End: 2020-10-26
Attending: FAMILY MEDICINE | Admitting: RADIOLOGY

## 2020-10-26 DIAGNOSIS — F06.30 MOOD DISORDER IN CONDITIONS CLASSIFIED ELSEWHERE: ICD-10-CM

## 2020-10-26 DIAGNOSIS — I10 ESSENTIAL HYPERTENSION, BENIGN: ICD-10-CM

## 2020-10-26 DIAGNOSIS — R63.30 FEEDING DIFFICULTIES: ICD-10-CM

## 2020-10-28 ENCOUNTER — COMMUNICATION - HEALTHEAST (OUTPATIENT)
Dept: FAMILY MEDICINE | Facility: CLINIC | Age: 70
End: 2020-10-28

## 2020-10-28 DIAGNOSIS — K20.90 ESOPHAGITIS: ICD-10-CM

## 2020-10-29 ENCOUNTER — COMMUNICATION - HEALTHEAST (OUTPATIENT)
Dept: NURSING | Facility: CLINIC | Age: 70
End: 2020-10-29

## 2020-11-02 ENCOUNTER — COMMUNICATION - HEALTHEAST (OUTPATIENT)
Dept: FAMILY MEDICINE | Facility: CLINIC | Age: 70
End: 2020-11-02

## 2020-11-02 DIAGNOSIS — I10 ESSENTIAL HYPERTENSION, BENIGN: ICD-10-CM

## 2020-11-03 ENCOUNTER — COMMUNICATION - HEALTHEAST (OUTPATIENT)
Dept: SCHEDULING | Facility: CLINIC | Age: 70
End: 2020-11-03

## 2020-11-03 ENCOUNTER — COMMUNICATION - HEALTHEAST (OUTPATIENT)
Dept: NURSING | Facility: CLINIC | Age: 70
End: 2020-11-03

## 2020-11-06 ENCOUNTER — COMMUNICATION - HEALTHEAST (OUTPATIENT)
Dept: FAMILY MEDICINE | Facility: CLINIC | Age: 70
End: 2020-11-06

## 2020-11-06 DIAGNOSIS — I10 ESSENTIAL HYPERTENSION, BENIGN: ICD-10-CM

## 2020-11-06 RX ORDER — AMLODIPINE BESYLATE 5 MG/1
5 TABLET ORAL DAILY
Qty: 90 TABLET | Refills: 2 | Status: SHIPPED | OUTPATIENT
Start: 2020-11-06 | End: 2021-08-02

## 2020-11-11 ENCOUNTER — COMMUNICATION - HEALTHEAST (OUTPATIENT)
Dept: NURSING | Facility: CLINIC | Age: 70
End: 2020-11-11

## 2020-11-18 ENCOUNTER — COMMUNICATION - HEALTHEAST (OUTPATIENT)
Dept: NURSING | Facility: CLINIC | Age: 70
End: 2020-11-18

## 2020-11-20 ENCOUNTER — COMMUNICATION - HEALTHEAST (OUTPATIENT)
Dept: FAMILY MEDICINE | Facility: CLINIC | Age: 70
End: 2020-11-20

## 2020-11-20 DIAGNOSIS — F32.9 REACTIVE DEPRESSION: ICD-10-CM

## 2020-11-20 DIAGNOSIS — F06.30 MOOD DISORDER IN CONDITIONS CLASSIFIED ELSEWHERE: ICD-10-CM

## 2020-12-10 ENCOUNTER — HOME CARE/HOSPICE - HEALTHEAST (OUTPATIENT)
Dept: HOME HEALTH SERVICES | Facility: HOME HEALTH | Age: 70
End: 2020-12-10

## 2020-12-10 ENCOUNTER — OFFICE VISIT - HEALTHEAST (OUTPATIENT)
Dept: FAMILY MEDICINE | Facility: CLINIC | Age: 70
End: 2020-12-10

## 2020-12-10 DIAGNOSIS — Z11.59 ENCOUNTER FOR HEPATITIS C SCREENING TEST FOR LOW RISK PATIENT: ICD-10-CM

## 2020-12-10 DIAGNOSIS — I10 ESSENTIAL HYPERTENSION, BENIGN: ICD-10-CM

## 2020-12-10 DIAGNOSIS — R41.89 COGNITIVE IMPAIRMENT: ICD-10-CM

## 2020-12-10 DIAGNOSIS — R00.1 BRADYCARDIA: ICD-10-CM

## 2020-12-10 DIAGNOSIS — F32.9 REACTIVE DEPRESSION: ICD-10-CM

## 2020-12-10 DIAGNOSIS — N18.30 STAGE 3 CHRONIC KIDNEY DISEASE, UNSPECIFIED WHETHER STAGE 3A OR 3B CKD (H): ICD-10-CM

## 2020-12-10 DIAGNOSIS — Z93.1 GASTROSTOMY PRESENT (H): ICD-10-CM

## 2020-12-10 DIAGNOSIS — R13.12 OROPHARYNGEAL DYSPHAGIA: ICD-10-CM

## 2020-12-10 DIAGNOSIS — Z12.31 VISIT FOR SCREENING MAMMOGRAM: ICD-10-CM

## 2020-12-10 DIAGNOSIS — Z13.820 SCREENING FOR OSTEOPOROSIS: ICD-10-CM

## 2020-12-10 DIAGNOSIS — E46 MALNUTRITION, UNSPECIFIED TYPE (H): ICD-10-CM

## 2020-12-10 DIAGNOSIS — I69.354 HEMIPLEGIA AND HEMIPARESIS FOLLOWING CEREBRAL INFARCTION AFFECTING LEFT NON-DOMINANT SIDE (H): ICD-10-CM

## 2020-12-10 DIAGNOSIS — Z78.0 ASYMPTOMATIC MENOPAUSAL STATE: ICD-10-CM

## 2020-12-10 LAB
ALBUMIN SERPL-MCNC: 4.3 G/DL (ref 3.5–5)
ALP SERPL-CCNC: 85 U/L (ref 45–120)
ALT SERPL W P-5'-P-CCNC: 24 U/L (ref 0–45)
ANION GAP SERPL CALCULATED.3IONS-SCNC: 12 MMOL/L (ref 5–18)
AST SERPL W P-5'-P-CCNC: 22 U/L (ref 0–40)
BILIRUB SERPL-MCNC: 0.7 MG/DL (ref 0–1)
BUN SERPL-MCNC: 29 MG/DL (ref 8–28)
CALCIUM SERPL-MCNC: 9.4 MG/DL (ref 8.5–10.5)
CHLORIDE BLD-SCNC: 103 MMOL/L (ref 98–107)
CHOLEST SERPL-MCNC: 102 MG/DL
CO2 SERPL-SCNC: 25 MMOL/L (ref 22–31)
CREAT SERPL-MCNC: 1.26 MG/DL (ref 0.6–1.1)
FASTING STATUS PATIENT QL REPORTED: NO
GFR SERPL CREATININE-BSD FRML MDRD: 42 ML/MIN/1.73M2
GLUCOSE BLD-MCNC: 111 MG/DL (ref 70–125)
HDLC SERPL-MCNC: 53 MG/DL
LDLC SERPL CALC-MCNC: 33 MG/DL
POTASSIUM BLD-SCNC: 4.6 MMOL/L (ref 3.5–5)
PROT SERPL-MCNC: 7.4 G/DL (ref 6–8)
SODIUM SERPL-SCNC: 140 MMOL/L (ref 136–145)
TRIGL SERPL-MCNC: 81 MG/DL

## 2020-12-10 RX ORDER — CITALOPRAM HYDROBROMIDE 20 MG/1
20 TABLET ORAL DAILY
Qty: 90 TABLET | Refills: 3 | Status: SHIPPED | OUTPATIENT
Start: 2020-12-10 | End: 2022-01-31

## 2020-12-10 ASSESSMENT — PATIENT HEALTH QUESTIONNAIRE - PHQ9: SUM OF ALL RESPONSES TO PHQ QUESTIONS 1-9: 2

## 2020-12-11 ENCOUNTER — COMMUNICATION - HEALTHEAST (OUTPATIENT)
Dept: FAMILY MEDICINE | Facility: CLINIC | Age: 70
End: 2020-12-11

## 2020-12-11 LAB
HCV AB SERPL QL IA: NEGATIVE
TSH SERPL DL<=0.005 MIU/L-ACNC: 1.62 UIU/ML (ref 0.3–5)

## 2020-12-13 ENCOUNTER — RECORDS - HEALTHEAST (OUTPATIENT)
Dept: HOME HEALTH SERVICES | Facility: HOME HEALTH | Age: 70
End: 2020-12-13

## 2020-12-14 ENCOUNTER — HOSPITAL ENCOUNTER (OUTPATIENT)
Dept: CARDIOLOGY | Facility: HOSPITAL | Age: 70
Discharge: HOME | End: 2020-12-14
Attending: FAMILY MEDICINE

## 2020-12-14 DIAGNOSIS — R00.1 BRADYCARDIA: ICD-10-CM

## 2020-12-17 ENCOUNTER — COMMUNICATION - HEALTHEAST (OUTPATIENT)
Dept: NURSING | Facility: CLINIC | Age: 70
End: 2020-12-17

## 2020-12-17 ENCOUNTER — HOME CARE/HOSPICE - HEALTHEAST (OUTPATIENT)
Dept: HOME HEALTH SERVICES | Facility: HOME HEALTH | Age: 70
End: 2020-12-17

## 2020-12-17 ENCOUNTER — COMMUNICATION - HEALTHEAST (OUTPATIENT)
Dept: HOME HEALTH SERVICES | Facility: HOME HEALTH | Age: 70
End: 2020-12-17

## 2020-12-18 ENCOUNTER — COMMUNICATION - HEALTHEAST (OUTPATIENT)
Dept: NURSING | Facility: CLINIC | Age: 70
End: 2020-12-18

## 2020-12-21 ENCOUNTER — HOME CARE/HOSPICE - HEALTHEAST (OUTPATIENT)
Dept: HOME HEALTH SERVICES | Facility: HOME HEALTH | Age: 70
End: 2020-12-21

## 2020-12-22 ENCOUNTER — HOME CARE/HOSPICE - HEALTHEAST (OUTPATIENT)
Dept: HOME HEALTH SERVICES | Facility: HOME HEALTH | Age: 70
End: 2020-12-22

## 2020-12-24 ENCOUNTER — HOME CARE/HOSPICE - HEALTHEAST (OUTPATIENT)
Dept: HOME HEALTH SERVICES | Facility: HOME HEALTH | Age: 70
End: 2020-12-24

## 2020-12-28 ENCOUNTER — HOME CARE/HOSPICE - HEALTHEAST (OUTPATIENT)
Dept: HOME HEALTH SERVICES | Facility: HOME HEALTH | Age: 70
End: 2020-12-28

## 2020-12-29 ENCOUNTER — COMMUNICATION - HEALTHEAST (OUTPATIENT)
Dept: FAMILY MEDICINE | Facility: CLINIC | Age: 70
End: 2020-12-29

## 2020-12-29 ENCOUNTER — OFFICE VISIT - HEALTHEAST (OUTPATIENT)
Dept: CARDIOLOGY | Facility: CLINIC | Age: 70
End: 2020-12-29

## 2020-12-29 DIAGNOSIS — Z86.73 HISTORY OF CVA (CEREBROVASCULAR ACCIDENT): ICD-10-CM

## 2020-12-29 DIAGNOSIS — R00.1 SINUS BRADYCARDIA: ICD-10-CM

## 2020-12-29 DIAGNOSIS — N18.31 STAGE 3A CHRONIC KIDNEY DISEASE (H): ICD-10-CM

## 2020-12-29 DIAGNOSIS — I10 BENIGN ESSENTIAL HYPERTENSION: ICD-10-CM

## 2020-12-29 LAB
ATRIAL RATE - MUSE: 73 BPM
DIASTOLIC BLOOD PRESSURE - MUSE: NORMAL
INTERPRETATION ECG - MUSE: NORMAL
P AXIS - MUSE: 50 DEGREES
PR INTERVAL - MUSE: 130 MS
QRS DURATION - MUSE: 72 MS
QT - MUSE: 366 MS
QTC - MUSE: 403 MS
R AXIS - MUSE: 4 DEGREES
SYSTOLIC BLOOD PRESSURE - MUSE: NORMAL
T AXIS - MUSE: 132 DEGREES
VENTRICULAR RATE- MUSE: 73 BPM

## 2020-12-29 ASSESSMENT — MIFFLIN-ST. JEOR: SCORE: 1043.86

## 2020-12-30 ENCOUNTER — HOME CARE/HOSPICE - HEALTHEAST (OUTPATIENT)
Dept: HOME HEALTH SERVICES | Facility: HOME HEALTH | Age: 70
End: 2020-12-30

## 2020-12-31 ENCOUNTER — HOME CARE/HOSPICE - HEALTHEAST (OUTPATIENT)
Dept: HOME HEALTH SERVICES | Facility: HOME HEALTH | Age: 70
End: 2020-12-31

## 2020-12-31 ENCOUNTER — COMMUNICATION - HEALTHEAST (OUTPATIENT)
Dept: NURSING | Facility: CLINIC | Age: 70
End: 2020-12-31

## 2021-01-04 ENCOUNTER — COMMUNICATION - HEALTHEAST (OUTPATIENT)
Dept: HOME HEALTH SERVICES | Facility: HOME HEALTH | Age: 71
End: 2021-01-04

## 2021-01-04 ENCOUNTER — HOME CARE/HOSPICE - HEALTHEAST (OUTPATIENT)
Dept: HOME HEALTH SERVICES | Facility: HOME HEALTH | Age: 71
End: 2021-01-04

## 2021-01-06 ENCOUNTER — HOME CARE/HOSPICE - HEALTHEAST (OUTPATIENT)
Dept: HOME HEALTH SERVICES | Facility: HOME HEALTH | Age: 71
End: 2021-01-06

## 2021-01-07 ENCOUNTER — HOME CARE/HOSPICE - HEALTHEAST (OUTPATIENT)
Dept: HOME HEALTH SERVICES | Facility: HOME HEALTH | Age: 71
End: 2021-01-07

## 2021-01-11 ENCOUNTER — HOME CARE/HOSPICE - HEALTHEAST (OUTPATIENT)
Dept: HOME HEALTH SERVICES | Facility: HOME HEALTH | Age: 71
End: 2021-01-11

## 2021-01-12 ENCOUNTER — HOME CARE/HOSPICE - HEALTHEAST (OUTPATIENT)
Dept: HOME HEALTH SERVICES | Facility: HOME HEALTH | Age: 71
End: 2021-01-12

## 2021-01-13 ENCOUNTER — HOME CARE/HOSPICE - HEALTHEAST (OUTPATIENT)
Dept: HOME HEALTH SERVICES | Facility: HOME HEALTH | Age: 71
End: 2021-01-13

## 2021-01-18 ENCOUNTER — HOME CARE/HOSPICE - HEALTHEAST (OUTPATIENT)
Dept: HOME HEALTH SERVICES | Facility: HOME HEALTH | Age: 71
End: 2021-01-18

## 2021-01-20 ENCOUNTER — HOME CARE/HOSPICE - HEALTHEAST (OUTPATIENT)
Dept: HOME HEALTH SERVICES | Facility: HOME HEALTH | Age: 71
End: 2021-01-20

## 2021-01-20 ENCOUNTER — COMMUNICATION - HEALTHEAST (OUTPATIENT)
Dept: FAMILY MEDICINE | Facility: CLINIC | Age: 71
End: 2021-01-20

## 2021-01-20 DIAGNOSIS — R13.12 OROPHARYNGEAL DYSPHAGIA: ICD-10-CM

## 2021-01-22 ENCOUNTER — COMMUNICATION - HEALTHEAST (OUTPATIENT)
Dept: CARE COORDINATION | Facility: CLINIC | Age: 71
End: 2021-01-22

## 2021-01-25 ENCOUNTER — COMMUNICATION - HEALTHEAST (OUTPATIENT)
Dept: NURSING | Facility: CLINIC | Age: 71
End: 2021-01-25

## 2021-01-25 ENCOUNTER — HOME CARE/HOSPICE - HEALTHEAST (OUTPATIENT)
Dept: HOME HEALTH SERVICES | Facility: HOME HEALTH | Age: 71
End: 2021-01-25

## 2021-01-26 ENCOUNTER — HOSPITAL ENCOUNTER (OUTPATIENT)
Dept: RADIOLOGY | Facility: HOSPITAL | Age: 71
Discharge: HOME | End: 2021-01-26
Attending: FAMILY MEDICINE | Admitting: RADIOLOGY

## 2021-01-26 ENCOUNTER — HOME CARE/HOSPICE - HEALTHEAST (OUTPATIENT)
Dept: HOME HEALTH SERVICES | Facility: HOME HEALTH | Age: 71
End: 2021-01-26

## 2021-01-26 ENCOUNTER — COMMUNICATION - HEALTHEAST (OUTPATIENT)
Dept: NURSING | Facility: CLINIC | Age: 71
End: 2021-01-26

## 2021-01-26 DIAGNOSIS — R13.12 OROPHARYNGEAL DYSPHAGIA: ICD-10-CM

## 2021-01-28 ENCOUNTER — HOME CARE/HOSPICE - HEALTHEAST (OUTPATIENT)
Dept: HOME HEALTH SERVICES | Facility: HOME HEALTH | Age: 71
End: 2021-01-28

## 2021-02-08 ENCOUNTER — HOSPITAL ENCOUNTER (OUTPATIENT)
Dept: MAMMOGRAPHY | Facility: CLINIC | Age: 71
Discharge: HOME | End: 2021-02-08
Attending: FAMILY MEDICINE

## 2021-02-08 DIAGNOSIS — Z12.31 VISIT FOR SCREENING MAMMOGRAM: ICD-10-CM

## 2021-02-23 ENCOUNTER — COMMUNICATION - HEALTHEAST (OUTPATIENT)
Dept: HOME HEALTH SERVICES | Facility: HOME HEALTH | Age: 71
End: 2021-02-23

## 2021-02-23 ENCOUNTER — OFFICE VISIT - HEALTHEAST (OUTPATIENT)
Dept: FAMILY MEDICINE | Facility: CLINIC | Age: 71
End: 2021-02-23

## 2021-02-23 DIAGNOSIS — R41.89 COGNITIVE IMPAIRMENT: ICD-10-CM

## 2021-02-23 DIAGNOSIS — E46 MALNUTRITION, UNSPECIFIED TYPE (H): ICD-10-CM

## 2021-02-23 DIAGNOSIS — F32.9 REACTIVE DEPRESSION: ICD-10-CM

## 2021-02-23 DIAGNOSIS — Z93.1 GASTROSTOMY PRESENT (H): ICD-10-CM

## 2021-02-23 DIAGNOSIS — R13.12 OROPHARYNGEAL DYSPHAGIA: ICD-10-CM

## 2021-02-23 DIAGNOSIS — I69.354 HEMIPLEGIA AND HEMIPARESIS FOLLOWING CEREBRAL INFARCTION AFFECTING LEFT NON-DOMINANT SIDE (H): ICD-10-CM

## 2021-02-23 DIAGNOSIS — I10 ESSENTIAL HYPERTENSION, BENIGN: ICD-10-CM

## 2021-02-24 ENCOUNTER — COMMUNICATION - HEALTHEAST (OUTPATIENT)
Dept: NURSING | Facility: CLINIC | Age: 71
End: 2021-02-24

## 2021-02-25 ENCOUNTER — COMMUNICATION - HEALTHEAST (OUTPATIENT)
Dept: FAMILY MEDICINE | Facility: CLINIC | Age: 71
End: 2021-02-25

## 2021-02-26 ENCOUNTER — COMMUNICATION - HEALTHEAST (OUTPATIENT)
Dept: NURSING | Facility: CLINIC | Age: 71
End: 2021-02-26

## 2021-03-04 ENCOUNTER — COMMUNICATION - HEALTHEAST (OUTPATIENT)
Dept: NURSING | Facility: CLINIC | Age: 71
End: 2021-03-04

## 2021-03-10 ENCOUNTER — COMMUNICATION - HEALTHEAST (OUTPATIENT)
Dept: NURSING | Facility: CLINIC | Age: 71
End: 2021-03-10

## 2021-03-19 ENCOUNTER — OFFICE VISIT - HEALTHEAST (OUTPATIENT)
Dept: FAMILY MEDICINE | Facility: CLINIC | Age: 71
End: 2021-03-19

## 2021-03-19 DIAGNOSIS — F32.9 REACTIVE DEPRESSION: ICD-10-CM

## 2021-03-22 ENCOUNTER — COMMUNICATION - HEALTHEAST (OUTPATIENT)
Dept: SCHEDULING | Facility: CLINIC | Age: 71
End: 2021-03-22

## 2021-03-22 ENCOUNTER — COMMUNICATION - HEALTHEAST (OUTPATIENT)
Dept: PHARMACY | Facility: CLINIC | Age: 71
End: 2021-03-22

## 2021-03-23 ENCOUNTER — COMMUNICATION - HEALTHEAST (OUTPATIENT)
Dept: NURSING | Facility: CLINIC | Age: 71
End: 2021-03-23

## 2021-03-23 ENCOUNTER — COMMUNICATION - HEALTHEAST (OUTPATIENT)
Dept: HOME HEALTH SERVICES | Facility: HOME HEALTH | Age: 71
End: 2021-03-23

## 2021-03-23 DIAGNOSIS — Z93.1 GASTROSTOMY PRESENT (H): ICD-10-CM

## 2021-03-23 DIAGNOSIS — E46 MALNUTRITION, UNSPECIFIED TYPE (H): ICD-10-CM

## 2021-03-23 DIAGNOSIS — R13.12 OROPHARYNGEAL DYSPHAGIA: ICD-10-CM

## 2021-03-23 DIAGNOSIS — I69.354 HEMIPLEGIA AND HEMIPARESIS FOLLOWING CEREBRAL INFARCTION AFFECTING LEFT NON-DOMINANT SIDE (H): ICD-10-CM

## 2021-03-23 DIAGNOSIS — R41.89 COGNITIVE IMPAIRMENT: ICD-10-CM

## 2021-03-30 ENCOUNTER — COMMUNICATION - HEALTHEAST (OUTPATIENT)
Dept: HOME HEALTH SERVICES | Facility: HOME HEALTH | Age: 71
End: 2021-03-30

## 2021-03-30 ENCOUNTER — COMMUNICATION - HEALTHEAST (OUTPATIENT)
Dept: FAMILY MEDICINE | Facility: CLINIC | Age: 71
End: 2021-03-30

## 2021-03-30 DIAGNOSIS — K21.00 GASTROESOPHAGEAL REFLUX DISEASE WITH ESOPHAGITIS: ICD-10-CM

## 2021-03-30 RX ORDER — OMEPRAZOLE
20 KIT
Qty: 300 ML | Refills: 3 | Status: SHIPPED | OUTPATIENT
Start: 2021-03-30 | End: 2022-09-09

## 2021-04-02 ENCOUNTER — COMMUNICATION - HEALTHEAST (OUTPATIENT)
Dept: HOME HEALTH SERVICES | Facility: HOME HEALTH | Age: 71
End: 2021-04-02

## 2021-04-06 ENCOUNTER — RECORDS - HEALTHEAST (OUTPATIENT)
Dept: ADMINISTRATIVE | Facility: OTHER | Age: 71
End: 2021-04-06

## 2021-04-07 ENCOUNTER — RECORDS - HEALTHEAST (OUTPATIENT)
Dept: ADMINISTRATIVE | Facility: OTHER | Age: 71
End: 2021-04-07

## 2021-04-07 ENCOUNTER — COMMUNICATION - HEALTHEAST (OUTPATIENT)
Dept: NURSING | Facility: CLINIC | Age: 71
End: 2021-04-07

## 2021-04-08 ENCOUNTER — COMMUNICATION - HEALTHEAST (OUTPATIENT)
Dept: FAMILY MEDICINE | Facility: CLINIC | Age: 71
End: 2021-04-08

## 2021-04-16 ENCOUNTER — COMMUNICATION - HEALTHEAST (OUTPATIENT)
Dept: FAMILY MEDICINE | Facility: CLINIC | Age: 71
End: 2021-04-16

## 2021-04-19 ENCOUNTER — COMMUNICATION - HEALTHEAST (OUTPATIENT)
Dept: FAMILY MEDICINE | Facility: CLINIC | Age: 71
End: 2021-04-19

## 2021-04-19 ENCOUNTER — COMMUNICATION - HEALTHEAST (OUTPATIENT)
Dept: NURSING | Facility: CLINIC | Age: 71
End: 2021-04-19

## 2021-04-22 ENCOUNTER — COMMUNICATION - HEALTHEAST (OUTPATIENT)
Dept: NURSING | Facility: CLINIC | Age: 71
End: 2021-04-22

## 2021-04-22 ENCOUNTER — COMMUNICATION - HEALTHEAST (OUTPATIENT)
Dept: FAMILY MEDICINE | Facility: CLINIC | Age: 71
End: 2021-04-22

## 2021-04-22 DIAGNOSIS — R13.12 OROPHARYNGEAL DYSPHAGIA: ICD-10-CM

## 2021-04-26 ENCOUNTER — AMBULATORY - HEALTHEAST (OUTPATIENT)
Dept: NURSING | Facility: CLINIC | Age: 71
End: 2021-04-26

## 2021-05-04 ENCOUNTER — HOSPITAL ENCOUNTER (OUTPATIENT)
Dept: RADIOLOGY | Facility: HOSPITAL | Age: 71
Discharge: HOME | End: 2021-05-04
Attending: FAMILY MEDICINE | Admitting: RADIOLOGY
Payer: COMMERCIAL

## 2021-05-04 DIAGNOSIS — R13.12 OROPHARYNGEAL DYSPHAGIA: ICD-10-CM

## 2021-05-10 ENCOUNTER — COMMUNICATION - HEALTHEAST (OUTPATIENT)
Dept: NURSING | Facility: CLINIC | Age: 71
End: 2021-05-10

## 2021-05-11 ENCOUNTER — RECORDS - HEALTHEAST (OUTPATIENT)
Dept: ADMINISTRATIVE | Facility: OTHER | Age: 71
End: 2021-05-11

## 2021-05-14 ENCOUNTER — COMMUNICATION - HEALTHEAST (OUTPATIENT)
Dept: FAMILY MEDICINE | Facility: CLINIC | Age: 71
End: 2021-05-14

## 2021-05-14 DIAGNOSIS — K20.90 ESOPHAGITIS: ICD-10-CM

## 2021-05-18 ENCOUNTER — RECORDS - HEALTHEAST (OUTPATIENT)
Dept: ADMINISTRATIVE | Facility: OTHER | Age: 71
End: 2021-05-18

## 2021-05-24 ENCOUNTER — COMMUNICATION - HEALTHEAST (OUTPATIENT)
Dept: NURSING | Facility: CLINIC | Age: 71
End: 2021-05-24

## 2021-05-26 VITALS
OXYGEN SATURATION: 97 % | DIASTOLIC BLOOD PRESSURE: 74 MMHG | HEART RATE: 55 BPM | TEMPERATURE: 97.9 F | SYSTOLIC BLOOD PRESSURE: 127 MMHG

## 2021-05-27 VITALS
TEMPERATURE: 98.1 F | HEART RATE: 53 BPM | SYSTOLIC BLOOD PRESSURE: 132 MMHG | DIASTOLIC BLOOD PRESSURE: 70 MMHG | OXYGEN SATURATION: 96 %

## 2021-05-27 VITALS
HEART RATE: 54 BPM | OXYGEN SATURATION: 95 % | SYSTOLIC BLOOD PRESSURE: 127 MMHG | DIASTOLIC BLOOD PRESSURE: 71 MMHG | TEMPERATURE: 99.4 F

## 2021-05-27 VITALS
SYSTOLIC BLOOD PRESSURE: 130 MMHG | TEMPERATURE: 98.4 F | HEART RATE: 53 BPM | DIASTOLIC BLOOD PRESSURE: 65 MMHG | OXYGEN SATURATION: 96 %

## 2021-05-27 VITALS
SYSTOLIC BLOOD PRESSURE: 139 MMHG | TEMPERATURE: 97.6 F | HEART RATE: 61 BPM | OXYGEN SATURATION: 95 % | DIASTOLIC BLOOD PRESSURE: 79 MMHG

## 2021-05-27 VITALS
DIASTOLIC BLOOD PRESSURE: 79 MMHG | TEMPERATURE: 98.8 F | OXYGEN SATURATION: 95 % | SYSTOLIC BLOOD PRESSURE: 119 MMHG | HEART RATE: 65 BPM

## 2021-05-27 VITALS — SYSTOLIC BLOOD PRESSURE: 108 MMHG | HEART RATE: 56 BPM | DIASTOLIC BLOOD PRESSURE: 81 MMHG | TEMPERATURE: 98.2 F

## 2021-05-27 VITALS
DIASTOLIC BLOOD PRESSURE: 75 MMHG | TEMPERATURE: 98.4 F | HEART RATE: 42 BPM | OXYGEN SATURATION: 94 % | SYSTOLIC BLOOD PRESSURE: 135 MMHG

## 2021-05-27 ASSESSMENT — PATIENT HEALTH QUESTIONNAIRE - PHQ9: SUM OF ALL RESPONSES TO PHQ QUESTIONS 1-9: 2

## 2021-05-27 NOTE — PROCEDURES
INTERVENTIONAL RADIOLOGY    Procedure:  GJ change    Meds:  None    MD:  Andrea    Complications:  None    Contrast:  20 cc    FT:  3.2 min    Findings:    Hub broken. Tube looped in stomach. New 18 Fr 45 cm GJ tube placed, tip in proximal jejunum.    Plan:  OK to use GJ tube

## 2021-05-28 NOTE — PROGRESS NOTES
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     Joint Pain Fingers     Closed Fracture Of The Distal Phalanx Of The Left Third Finger     CAP (community acquired pneumonia)     Fall, initial encounter     Special screening for malignant neoplasms, colon     Diaphragmatic hernia     Diaphragmatic hernia without obstruction or gangrene     Dysphagia     Esophageal obstruction     Esophagitis     Gastro-esophageal reflux disease with esophagitis     Gastroesophageal reflux disease with esophagitis     Gastroesophageal reflux disease without esophagitis     Gastro-esophageal reflux disease without esophagitis     Obstruction of esophagus     Oropharyngeal dysphagia     Aspiration pneumonitis (H)     Aspiration pneumonia (H)     Hypomagnesemia     Lung nodule     Renal cyst       Onset Date: 4/24/2019 (order date)  Reason for Evaluation: Re-assess oropharyngeal swallow physiology and function  Pertinent History: 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 Previous Video Swallow Studies completed on 4/10/17, 7/7/17, 8/16/18, 10/12/18, and 12/5/18.  Most recent study showed silent aspiration with honey-thick liquid and puree consistency.  Patient was hospitalized last August with aspiration pneumonia.  Current Diet: NPO; TF via GJ-tube  Baseline Diet: NPO since hospitalization in August 2018     Patient is a 69-year-old female referred for a repeat Video Swallow Study due to dysphagia.  Patient is familiar to this writer from  previous Video Swallow Study in December, as well as admission to this facility last August.  Patient resides at [facility].  She has previously received in-home Speech Therapy for her dysarthria, however, it is unclear whether she was also participating in dysphagia therapy.  The purpose of this study is to assess for any improvement in swallow function and determine safety of oral intake.       Patient presents as alert and cooperative during this evaluation.  Her speech is moderately to severely dysarthric.  She was accompanied by her sister, Alina, who observed study and was present afterwards to receive education regarding results and recommendations.    An  was not applicable     Patient was given honey-thick liquid and puree consistency.  Thin and nectar-thick liquid were not given due to high aspiration risk.     Oral Phase:     Dentition/Oral hygiene: Patient wears an upper dental partial. She is missing some upper front teeth and nearly all of her lower molars and premolars. Oral hygiene was adequate.     Bolus prep and oral control were moderately impaired. Patient demonstrated a prolonged oral phase and used persistent tongue pumping to propel bolus posteriorly. Piecemeal deglutition was noted with both consistencies.     Anterior-Posterior transit was moderately delayed/effortful.     Premature spillage beyond the base of the tongue into the valleculae occurred consistently with honey-thick liquid and intermittently with puree.     Tongue base retraction was not impaired.     Oral stasis did not occur with either consistency.     Pharyngeal Phase:     Tavo aspiration occurred with honey-thick liquid.  Aspiration was essentially silent.  Patient had a weak and severely delayed tracheal cough approximately 90 seconds after aspiration event.       Laryngeal penetration occurred intermittently with puree.  This was shallow and transient in nature.     Due to decreased cognitive  status, swallow strategies were not trialed under fluoroscopy.     Swallow response was inconsistently delayed with honey-thick liquid and puree.  This resulted in variable pourover past the epiglottis and intermittently to the pyriform sinuses with puree.  With honey-thick, there was pourover past the epiglottis and directly into the laryngeal vestibule, resulting in chriss aspiration.     Epiglottic movement was complete consistently across texture trials.     Mild pharyngeal stasis was observed in the pyriform sinuses after boluses of honey-thick liquid.  This cleared with spontaneous dry swallows.     Pharyngeal constriction was not impaired.     Hyolaryngeal elevation was slow and reduced. Hyolaryngeal excursion was slow and reduced.     Cricopharyngeal function was not impaired. Cervical esophageal function was not impaired.     Assessment:     Patient demonstrated moderate oral dysphagia and moderate to severe pharyngeal dysphagia.  There is significant variability in patient's swallow.  Some boluses are swallowed almost normally, while others demonstrate pharyngeal pourover, mild penetration, or chriss aspiration.     Patient remains at high aspiration risk with all intake.  She is also at risk for aspiration-related illness due to poor cough response and inability to clear airway of aspirated material.      Rehab potential is poor based on prior level of function, evaluation results, and time post onset.     Recommendations:     Plan: Continue NPO due to high aspiration risk with any/all oral intake     Strategies: N/A     Speech Therapy is not recommended at this time     Referrals: N/A     Reviewed history of swallow problem with patient and her sister, Alina, and verbally explained roles of SLP and radiologist.  Verbally explained process of VFSS prior to administration of barium.  Verbally explained results and recommendations to patient and Alina. SLP answered questions and stated that a copy of this  report will be faxed to patient's referring provider, Dr. Natalie Herrera of [facility]. Both patient and Alina verbalized understanding of the education provided.    30 dysphagia minutes    Brisa Mariano MA, CCC-SLP

## 2021-05-30 VITALS — WEIGHT: 125.4 LBS | BODY MASS INDEX: 24.49 KG/M2

## 2021-05-30 VITALS — WEIGHT: 122.7 LBS | BODY MASS INDEX: 24.09 KG/M2 | HEIGHT: 60 IN

## 2021-05-31 VITALS — BODY MASS INDEX: 24.39 KG/M2 | WEIGHT: 124.9 LBS

## 2021-05-31 VITALS — BODY MASS INDEX: 24.13 KG/M2 | WEIGHT: 122.9 LBS | HEIGHT: 60 IN

## 2021-05-31 VITALS — WEIGHT: 121 LBS | BODY MASS INDEX: 23.63 KG/M2

## 2021-06-01 VITALS — BODY MASS INDEX: 25.9 KG/M2 | WEIGHT: 132.6 LBS

## 2021-06-01 VITALS — BODY MASS INDEX: 25.32 KG/M2 | HEIGHT: 60 IN | WEIGHT: 129 LBS

## 2021-06-03 ENCOUNTER — OFFICE VISIT - HEALTHEAST (OUTPATIENT)
Dept: FAMILY MEDICINE | Facility: CLINIC | Age: 71
End: 2021-06-03

## 2021-06-03 DIAGNOSIS — Z93.1 GASTROSTOMY PRESENT (H): ICD-10-CM

## 2021-06-03 DIAGNOSIS — R13.12 OROPHARYNGEAL DYSPHAGIA: ICD-10-CM

## 2021-06-03 DIAGNOSIS — I10 ESSENTIAL HYPERTENSION, BENIGN: ICD-10-CM

## 2021-06-03 DIAGNOSIS — R41.89 COGNITIVE IMPAIRMENT: ICD-10-CM

## 2021-06-03 DIAGNOSIS — R32 URINARY INCONTINENCE, UNSPECIFIED TYPE: ICD-10-CM

## 2021-06-03 DIAGNOSIS — N18.30 STAGE 3 CHRONIC KIDNEY DISEASE, UNSPECIFIED WHETHER STAGE 3A OR 3B CKD (H): ICD-10-CM

## 2021-06-03 DIAGNOSIS — E46 MALNUTRITION, UNSPECIFIED TYPE (H): ICD-10-CM

## 2021-06-03 DIAGNOSIS — K20.90 ESOPHAGITIS: ICD-10-CM

## 2021-06-03 DIAGNOSIS — F32.9 REACTIVE DEPRESSION: ICD-10-CM

## 2021-06-03 DIAGNOSIS — I69.354 HEMIPLEGIA AND HEMIPARESIS FOLLOWING CEREBRAL INFARCTION AFFECTING LEFT NON-DOMINANT SIDE (H): ICD-10-CM

## 2021-06-03 DIAGNOSIS — K21.00 GASTROESOPHAGEAL REFLUX DISEASE WITH ESOPHAGITIS WITHOUT HEMORRHAGE: ICD-10-CM

## 2021-06-03 LAB
ALBUMIN SERPL-MCNC: 4.2 G/DL (ref 3.5–5)
ALP SERPL-CCNC: 83 U/L (ref 45–120)
ALT SERPL W P-5'-P-CCNC: 21 U/L (ref 0–45)
ANION GAP SERPL CALCULATED.3IONS-SCNC: 11 MMOL/L (ref 5–18)
AST SERPL W P-5'-P-CCNC: 19 U/L (ref 0–40)
BILIRUB SERPL-MCNC: 0.8 MG/DL (ref 0–1)
BUN SERPL-MCNC: 28 MG/DL (ref 8–28)
CALCIUM SERPL-MCNC: 9.4 MG/DL (ref 8.5–10.5)
CHLORIDE BLD-SCNC: 104 MMOL/L (ref 98–107)
CO2 SERPL-SCNC: 27 MMOL/L (ref 22–31)
CREAT SERPL-MCNC: 1.19 MG/DL (ref 0.6–1.1)
ERYTHROCYTE [DISTWIDTH] IN BLOOD BY AUTOMATED COUNT: 14.3 % (ref 11–14.5)
GFR SERPL CREATININE-BSD FRML MDRD: 45 ML/MIN/1.73M2
GLUCOSE BLD-MCNC: 122 MG/DL (ref 70–125)
HCT VFR BLD AUTO: 43.1 % (ref 35–47)
HGB BLD-MCNC: 14.2 G/DL (ref 12–16)
MCH RBC QN AUTO: 25.8 PG (ref 27–34)
MCHC RBC AUTO-ENTMCNC: 32.9 G/DL (ref 32–36)
MCV RBC AUTO: 78 FL (ref 80–100)
PLATELET # BLD AUTO: 239 THOU/UL (ref 140–440)
PMV BLD AUTO: 10.8 FL (ref 7–10)
POTASSIUM BLD-SCNC: 4.6 MMOL/L (ref 3.5–5)
PROT SERPL-MCNC: 7.3 G/DL (ref 6–8)
RBC # BLD AUTO: 5.51 MILL/UL (ref 3.8–5.4)
SODIUM SERPL-SCNC: 142 MMOL/L (ref 136–145)
WBC: 10.6 THOU/UL (ref 4–11)

## 2021-06-03 RX ORDER — FAMOTIDINE 40 MG/5ML
20 POWDER, FOR SUSPENSION ORAL 2 TIMES DAILY PRN
Qty: 450 ML | Refills: 3 | Status: SHIPPED | OUTPATIENT
Start: 2021-06-03 | End: 2022-09-09

## 2021-06-04 VITALS
DIASTOLIC BLOOD PRESSURE: 70 MMHG | BODY MASS INDEX: 26.56 KG/M2 | OXYGEN SATURATION: 95 % | SYSTOLIC BLOOD PRESSURE: 140 MMHG | WEIGHT: 136 LBS | HEART RATE: 60 BPM | TEMPERATURE: 98.4 F

## 2021-06-04 VITALS
TEMPERATURE: 97.1 F | HEART RATE: 74 BPM | DIASTOLIC BLOOD PRESSURE: 80 MMHG | WEIGHT: 134 LBS | SYSTOLIC BLOOD PRESSURE: 138 MMHG | BODY MASS INDEX: 26.17 KG/M2 | OXYGEN SATURATION: 96 %

## 2021-06-04 VITALS
SYSTOLIC BLOOD PRESSURE: 138 MMHG | DIASTOLIC BLOOD PRESSURE: 82 MMHG | BODY MASS INDEX: 24.61 KG/M2 | HEART RATE: 72 BPM | WEIGHT: 126 LBS | OXYGEN SATURATION: 95 %

## 2021-06-04 VITALS
DIASTOLIC BLOOD PRESSURE: 96 MMHG | TEMPERATURE: 98 F | OXYGEN SATURATION: 97 % | SYSTOLIC BLOOD PRESSURE: 138 MMHG | BODY MASS INDEX: 26.56 KG/M2 | HEART RATE: 70 BPM | WEIGHT: 136 LBS

## 2021-06-05 VITALS
OXYGEN SATURATION: 97 % | HEART RATE: 56 BPM | DIASTOLIC BLOOD PRESSURE: 90 MMHG | SYSTOLIC BLOOD PRESSURE: 138 MMHG | BODY MASS INDEX: 26.37 KG/M2 | WEIGHT: 135 LBS

## 2021-06-05 VITALS
BODY MASS INDEX: 26.5 KG/M2 | HEART RATE: 72 BPM | SYSTOLIC BLOOD PRESSURE: 142 MMHG | RESPIRATION RATE: 16 BRPM | DIASTOLIC BLOOD PRESSURE: 86 MMHG | HEIGHT: 60 IN | WEIGHT: 135 LBS

## 2021-06-05 VITALS
SYSTOLIC BLOOD PRESSURE: 138 MMHG | BODY MASS INDEX: 24.8 KG/M2 | WEIGHT: 127 LBS | OXYGEN SATURATION: 96 % | HEART RATE: 61 BPM | DIASTOLIC BLOOD PRESSURE: 82 MMHG

## 2021-06-06 NOTE — TELEPHONE ENCOUNTER
" calling  No consent on file    Pt was sent home from PCP office due to a \"cough\"  Per  she has had \" a slight cough since she has has a feeding tube placed.  She was not examined due to this  Pt was told to call triage for corona virus protocol this was completed    Pt is in a asst living  & pt  was to be seen for a well visit & to establish care.  Pt is to be discharged to home soon    Pt has had no complaint of any illness, respiratory or otherwise    Jennifer Castaneda RN  [place] Nurse Advisor        Reason for Disposition    Cough lasts > 3 weeks    [facility] Specific Disposition - REQUIRED: [facility] Specific Patient Instructions  COVID 19 Nurse Triage Plan    Pt triaged, all answers to assessment question indicated to no respiratory  illness    Pt will call clinic should she show signs of illness    Protocols used: COUGH - CHRONIC-A-AH, CORONAVIRUS (2019-NCOV) EXPOSURE-A-OH      "

## 2021-06-06 NOTE — PROGRESS NOTES
Patient presents to clinic for establishment of care.  Is currently in an assisted living facility and patient is nearing end of life care and prefer to pass away at home.  Patient needs to establish care with a physician here in order to receive hospice care per  report.    However, patient presented with a cough and our new protocol indicates the patient needs to contact the on care provider in order to be triaged prior to being seen in our clinic due to corona virus.  If they are triaged and can be seen, I would be happy to do a full establishment of care visit.  If she screens necessary for testing, she will be direct research.    At this time, I suspect strongly that patient will need hospice care.

## 2021-06-06 NOTE — PROGRESS NOTES
Assessment/Plan:     Patient presents to clinic for establishment of care.  This visit was complicated by patient's lack of recent documentation or lab work to evaluate.  Patient is overdue for a an annual wellness visit, but with the recent outbreak of coronavirus, we are recommending that patient's refrain from presenting to clinic for wellness visits at this time.    Patient does have several preventative care task items which can be accomplished when she returns to clinic.  She will also need all of her medications refilled and sent via [facility], but I would like to speak to the physicians at her facility prior to that.  Patient also need a home health care nurse and possibly some transitional help, to include patient's 's education in managing her feeding tube.    Today's visit was mostly to evaluate patient's history, determine if there were any pressing care needs, and to establish a connection with her care facility to determine the easiest way to transition patient to home.  The care facility indicated that patient has been unable to tolerate anything by mouth for risk of aspiration pneumonia.  This will likely complicate her discharge, and provides very little options for patient for cough relief.    Problem List Items Addressed This Visit        [facility] Cardiac Problems    Oropharyngeal dysphagia - Primary       Other    Malnutrition, unspecified type (H)    Hemiplegia and hemiparesis following cerebral infarction affecting left non-dominant side (H)          Return to clinic for annual wellness visit, will likely need orders placed prior to this visit.    Subjective:      Bing Rodrigues is a 70 y.o. female who presents to clinic for establishment of care.  Originally she was turned away in order to be screened for coronavirus, but was found to not need triage and was returned to her room.    The visit was complicated by the patient's poor understanding of her own medication,  patient's 's vagueness about patient's disposition from her current facility, and the general plan for overall health.    Patient is currently n.p.o. and does have a cough.  She is wondering what she could do in order to help treat the cough.  Patient is adamant that she is leaving the facility on April 1, patient's  appears less certain about this.    Patient has a history of a stroke, significant dysphasia with a history of aspiration pneumonia, and several other significant medical conditions, including hypertension which is poorly controlled today.      Past Medical History, Family History, and Social History reviewed.     Current Outpatient Medications on File Prior to Visit   Medication Sig Dispense Refill     acetaminophen (TYLENOL) 500 mg/15.62 mL solution 19.99 mL (640 mg total) by G-tube route every 4 (four) hours as needed for fever or pain.  0     amLODIPine (NORVASC) 5 MG tablet        aspirin 81 mg chewable tablet 1 tablet (81 mg total) by G-tube route daily.  0     calcium, as carbonate, (TUMS) 200 mg calcium (500 mg) chewable tablet 2 tablets (400 mg total) by G-tube route 4 (four) times a day as needed for heartburn.  0     cholecalciferol, vitamin D3, 1,000 unit tablet 1 tablet (1,000 Units total) by G-tube route daily.  0     citalopram (CELEXA) 10 MG tablet        guar gum (BENEFIBER;NUTRISOURCE) Pack powder packet 2 packets by Enteral Tube route 2 (two) times a day.  0     loperamide (IMODIUM) 1 mg/5 mL solution 10 mL (2 mg total) by G-tube route daily as needed for diarrhea.  0     melatonin 3 mg Tab tablet 1 tablet (3 mg total) by Enteral Tube route at bedtime as needed.  0     omeprazole (PRILOSEC) 2 mg/mL SusR suspension 10 mL (20 mg total) by Enteral Tube route daily before breakfast.  0     lisinopril (PRINIVIL,ZESTRIL) 40 MG tablet 1 tablet (40 mg total) by G-tube route daily. (Patient taking differently: 10 mg by G-tube route daily. ) 90 tablet 3     No current  facility-administered medications on file prior to visit.        Review of systems is as stated in HPI.  The remainder of the 10 system review is otherwise negative.    Objective:     BP (!) 138/96   Pulse 70   Temp 98  F (36.7  C)   Wt 136 lb (61.7 kg)   SpO2 97%   BMI 26.56 kg/m   Body mass index is 26.56 kg/m .    Gen: Alert, NAD, appears stated age, normal hygiene   MSK: grossly full range of motion in all joints, walks with a walker  Neuro: Obvious facial nerve deficits status post stroke    This note has been dictated using voice recognition software. Any grammatical or context distortions are unintentional and inherent to the the software.     Binta Wheeler MD

## 2021-06-06 NOTE — TELEPHONE ENCOUNTER
"RN Triage:    , Ramon, is calling.  Bing has been living in an assisted living facility since November, 2018.  Bing would like to move back home now because \"so many people are dying who live there\", and \"she wants to die at home\".  Ramon thinks he will be able to get in-home help.  Would like to set up an establish care visit as Bing has not been seen in clinic since 6/18, and her primary is no longer with the clinic.  Transferred caller to  for appointment.    Pat Shi, RN   [facility]    Reason for Disposition    Nursing judgment    Protocols used: NO GUIDELINE OR REFERENCE [id number]      "

## 2021-06-07 ENCOUNTER — COMMUNICATION - HEALTHEAST (OUTPATIENT)
Dept: NURSING | Facility: CLINIC | Age: 71
End: 2021-06-07

## 2021-06-09 NOTE — TELEPHONE ENCOUNTER
I am unsure who is supposed to be calling family for education - PCP back in office tomorrow, will wait for her to address.    Dr Lindsay

## 2021-06-09 NOTE — PROGRESS NOTES
Assessment/Plan:     Patient presents to clinic status post fall with an area of obvious bruising and some induration without obvious fluctuance or signs of infection.  I appreciate the home health nurse for attention to detail and marking of the area of concern, but I do not suspect cellulitis or infection at this time.  Will obtain a CBC for thoroughness and patient is under strict return precautions to return should she start having any symptoms of illness.    With respect to the fall, it appears that safety mechanisms have already been put in place to prevent this in the future.  She is now walking with a walker and the pillows have been removed from that area.  The fall itself appeared to be fairly minor.    Most importantly, patient's  does not feel comfortable managing medications.  Patient's  has already made an egregious error when he substituted some sort of feeding powder for athlete's foot powder.  Given that they are only allowed home health services, symptoms as few as 2 times per week, I think that this is a huge safety concern.  I will have patient reach out to the nursing staff involved in coordination of resources in the community, and follow-up with me within a week.  If the medication management is unable to be resolved, patient may not be able to remain at home.      Problem List Items Addressed This Visit     None      Visit Diagnoses     Induration, skin    -  Primary    Relevant Orders    HM1(CBC and Differential)    HM1 (CBC with Diff)    Fall, initial encounter        Conflicted attitude towards medication management        Self-care deficit for medication administration              Return to clinic next week for follow up.    Subjective:      Bing Rodrigues is a 70 y.o. female who presents to clinic for fall.     Patient was sitting on the edge of a couch which had numerous pillows so she felt precarious.  She noticed that she toppled over and landed on the carpet but  also impacted the foot of the recliner.  She did not lose consciousness and she did not feel dizzy, she did not injure her head.  She has significant bruising along the left aspect of her leg where she fell and it is painful to touch. Home health is coming a variable amount of days, anywhere from every day in the week to 2 days for the entire week.  The home health nurse was concerned about the induration and had marked the periphery of the lesion with a permanent marker.    More concerning, patient's  states that he does not feel comfortable managing patient's care.  The activities of daily living seem recently well covered by patient herself, and the area of the fall has already been addressed, patient now has a walker and she has moved all the pillows.  However, he does not feel comfortable managing her medications at all.  He accidentally put foot powder in her feeding tube the other day.  He is worried about managing this in the future.      Past Medical History, Family History, and Social History reviewed.     Current Outpatient Medications on File Prior to Visit   Medication Sig Dispense Refill     acetaminophen (TYLENOL) 500 mg/15.62 mL solution 19.99 mL (640 mg total) by G-tube route every 4 (four) hours as needed for fever or pain.  0     amLODIPine (NORVASC) 5 MG tablet        aspirin 81 mg chewable tablet 1 tablet (81 mg total) by G-tube route daily.  0     calcium, as carbonate, (TUMS) 200 mg calcium (500 mg) chewable tablet 2 tablets (400 mg total) by G-tube route 4 (four) times a day as needed for heartburn.  0     cholecalciferol, vitamin D3, 1,000 unit tablet 1 tablet (1,000 Units total) by G-tube route daily.  0     citalopram (CELEXA) 10 MG tablet        ferrous sulfate 325 (65 FE) MG tablet 1 tablet       guar gum (BENEFIBER;NUTRISOURCE) Pack powder packet 2 packets by Enteral Tube route 2 (two) times a day.  0     guar gum (NUTRISOURCE FIBER ORAL) DISSOLVE 4 TEASPOONSFUL IN WATER AND GIVE  PER G-TUBE TWICE DAILY       loperamide (IMODIUM) 1 mg/5 mL solution 10 mL (2 mg total) by G-tube route daily as needed for diarrhea.  0     melatonin 3 mg Tab tablet 1 tablet (3 mg total) by Enteral Tube route at bedtime as needed.  0     nystatin (MYCOSTATIN) powder Apply to affected area 3 times daily 15 g 0     omeprazole (PRILOSEC) 2 mg/mL SusR suspension 10 mL (20 mg total) by Enteral Tube route daily before breakfast.  0     No current facility-administered medications on file prior to visit.        Review of systems is as stated in HPI.  The remainder of the 10 system review is otherwise negative.    Objective:     /82   Pulse 72   Wt 126 lb (57.2 kg)   SpO2 95%   BMI 24.61 kg/m   Body mass index is 24.61 kg/m .    Gen: Alert, NAD, appears stated age, normal hygiene   Neuro: Obvious post-stroke deficits but nothing worse than baseline  Psych: no apparent hallucinations or delusions, no pressured speech; alert, oriented x3  SKIN: Large area of patient's left lateral thigh is covered in bruising without any break in the skin.  However, there is a 5 cm x 6 cm area that is indurated without obvious erythema although this could be confounded by the presence of bruising.  It is not warm to the touch.  There is no fluctuance.    This note has been dictated using voice recognition software. Any grammatical or context distortions are unintentional and inherent to the the software.     Binta Wheeler MD

## 2021-06-09 NOTE — TELEPHONE ENCOUNTER
Who is calling:  Patient's , Oscar  Reason for Call:  Patient is coming home from assisted living this week, and Oscar was calling because he is supposed to be getting a call and some training on how to care for the Patient's feeding tube/change the tube. Please contact Oscar to help with next route of care.  Date of last appointment with primary care: NA  Okay to leave a detailed message: Yes

## 2021-06-09 NOTE — PROGRESS NOTES
Clinic Care Coordination Contact  [facility]/Voicemail       Clinical Data: Care Coordinator Outreach  Outreach attempted x 1.  Left message on patient's voicemail with call back information and requested return call.  Plan:  Care Coordinator will try to reach patient again in 1-2 business days.

## 2021-06-09 NOTE — PROGRESS NOTES
"Clinic Care Coordination Contact  Care Team Conversations            S= Situation-Patient to establish care with Dr. Wheeler.  CCC to assist with coordination of care during transition from living facility to home.  B= Background-Has lived at current facility since 2018.  [facility] to that.  Bing would like to move home with her spouse Ramon.    Barriers-medical needs, G-tube, medications, quarantine, learning/cognition, coordination of care  A= Action Step-Writer spoke with Jenifer the medical director at [facility] facility.  She stated Bing has 'failed numerous swallow studies and is NPO'.  She has a G-tube for all medications, and scheduled feedings via Kangaroo pump.  The pump she is currently using is hers to bring home from the facility.  Bing also has a 'rent to own' hospital bed.  Ramon has had no training on the feedings, pump, boluses, G-tube, medications,etc.  He is unable to come to the facility for training due to 'no visitor' rules.    Jenifer has ordered extra tube feeding formula, bags and medications for Ramon to bring home upon discharge.  [facility]    Patient ambulates with a walker.  It is unknown if Bing's bathroom will be adequate for her.    Current living facility has a conference call with her brother and sister today for additional information related to her care.  R= Recommendation- Face to face with Dr. Wheeler 6/30/20.  Patient to stay at current facility until establish care visit completed.  At that visit home care order nursing will be placed.  [facility]  Once a \"safe plan\" established for home care nursing to be able to teach Ramon  feedings, pump, boluses, G-tube, medications,etc. Okay for patient to discharge to home.  [facility] to enroll patient to assist with transition.     "

## 2021-06-09 NOTE — TELEPHONE ENCOUNTER
Patient did not have an appointment scheduled therefore I called and arranged an appointment with her  Oscar for Tuesday.

## 2021-06-09 NOTE — TELEPHONE ENCOUNTER
Who is calling:  Pablito (nurse) at [facility]    Reason for Call:  Home care nurse calling to report that Pt fell on 7/15/20 around 2pm from her couch to the floor (she was trying to lay down on the couch, but there were many pillows and she fell to the floor). She landed on her Left side (feeding tube side) and site is bruised. Pt told nurse she is fine and she's declining a provider visit to look at it. The nurse states the Pt is able to walk and she looks okay.     One additional note, the nurse reports redness (really red) on Pt's right groin area -  he suspects possible yeast infection and thinks the Dr may want to prescribe nystatin powder (if so, please send to: [facility] [place]).     Date of last appointment with primary care: 6/30/2020  Okay to leave a detailed message: Yes

## 2021-06-09 NOTE — PROGRESS NOTES
Clinic Care Coordination Contact  Community Health Worker Initial Outreach    CHW Initial Information Gathering:  Referral Source: PCP  Preferred Urgent Care: [facility], [phone]  Current living arrangement:: I live in a private home with spouse  Informal Support system:: Spouse  No PCP office visit in Past Year: No  Transportation means:: Family       Patient accepts CC: Yes, scheduled with Criss on July 28th @ 10am by phone with Spouse (Oscar) Resources to stay safe in home

## 2021-06-09 NOTE — PROGRESS NOTES
[facility] Daily Progress     Patient Name: Bing Rodrigues  Date: 3/27/2017  Visit #: 3/12  Referral Diagnosis: Left-sided weakness, Abnormality of Gait  Referring provider: Isabel Barrios MD  Visit Diagnosis:     ICD-10-CM    1. Generalized muscle weakness M62.81    2. History of CVA (cerebrovascular accident) Z86.73    3. Benign Essential Hypertension I10    4. Chronic Kidney Disease, Stage 3 N18.3    5. Ataxic Gait R26.9    6. 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 Osteopenia M89.9     M94.9          Assessment:     Patient demonstrates understanding/independence with home program.  Patient is benefitting from skilled physical therapy and is making steady progress toward functional goals.    Goal Status:  Pt. will be able to walk : 20 minutes;for community mobility;in 12 weeks;Comment  Comment:: with good gait speed and no need to touch walls  Patient will transfer: for in/out of chair;in 12 weeks;Comment  Comment: without use of hands 8 reps       Plan / Patient Education:     Continue with initial plan of care.  Progress with home program as tolerated.    Subjective:     Pain Ratin  I am doing OK, but I notice I occasionally had a hard time getting up. I need to use my arms to rise and the left arm is unable to help      Objective:     Pt. reports a feeling of dysequilibrium but no vertigo, and no definite loss off balance is noted to either side.  She walks with less left knee flexion, and tends to hold SEC in right hand rather than use it for support.  She definitely walks faster and even more normal step length without the SEC, but does reach out for the wall for occasional support. Gait speed is very slow.  Sit to stand test is 6 reps from 20 inches - no use of arms  PSIS is level  Prone hip length is normal and quad length is normal in prone knee bends    Treatment Today     TREATMENT MINUTES COMMENTS   Evaluation     Self-care/ Home management     Manual therapy 40 Supine MT/SCS to  Bilat DARWIN and IJ-LV, L PAR, and R MAS-LV, L VEST-N, Bilat HYP-N,, Bilat GLO_N, L OPT, ABD, OM-N,   Neuromuscular Re-education     Therapeutic Activity     Therapeutic Exercises 15 See exercises flow sheet for performed exercises.   Gait training     Modality__________________                Total 60    Blank areas are intentional and mean the treatment did not include these items.       Fior Lundberg  3/27/2017

## 2021-06-09 NOTE — PROGRESS NOTES
"[facility] Daily Progress     Patient Name: Bing Rodrigues  Date: 2017  Visit #:   Referral Diagnosis: Left-sided weakness, Abnormality of Gait  Referring provider: Isabel Barrios MD  Visit Diagnosis:     ICD-10-CM    1. Generalized muscle weakness M62.81    2. History of CVA (cerebrovascular accident) Z86.73    3. Benign Essential Hypertension I10    4. Chronic Kidney Disease, Stage 3 N18.3    5. Ataxic Gait R26.9    6. 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 Osteopenia M89.9     M94.9          Assessment:     Patient is appropriate to continue with skilled physical therapy intervention, as indicated by initial plan of care.    Goal Status:  Pt. will be able to walk : 20 minutes;for community mobility;in 12 weeks;Comment  Comment:: with good gait speed and no need to touch walls  Patient will transfer: for in/out of chair;in 12 weeks;Comment  Comment: without use of hands 8 reps   No Data Recorded    Plan / Patient Education:     Continue with initial plan of care.  Progress with home program as tolerated.    Subjective:     Pain Ratin  She is wondering if she's going to get better. Feeling \"okay\" today. \"I have trouble getting up from chair/sofa\".  She is not sure what exercises she's supposed to be doing.  She's not doing exercises at home.   Has an appointment with [facility] on Friday for swallowing problems.  She has difficulty eating and can only swallow soft foods. She denies breathing problems at night.     Objective:     Unable to perform sit-stand at >20 inch height without hand support, sometimes requires more than 1 attempt to rise to standing.   (B) LE motor control is decreased with exercises, ambulation, transfers and mat mobility.  Gait pattern is unchanged from previous notes.   Has difficulty breathing in supine positioning without head elevation.     Treatment Today     TREATMENT MINUTES COMMENTS   Evaluation     Self-care/ Home management     Manual therapy 23 " MFR/SCS - (L) DARWIN-LV, thor inlet, hyoid release, (B) suprahyoids, retrohyoids, mediastinum   Neuromuscular Re-education     Therapeutic Activity     Therapeutic Exercises 30 Exercises per flow sheet.  Emphasized doing newly issued exercises at home.    Gait training     Modality__________________                Total 53    Blank areas are intentional and mean the treatment did not include these items.       Raquel Rodriguez  4/4/2017

## 2021-06-09 NOTE — PROGRESS NOTES
Clinic Care Coordination Contact  Community Health Worker Initial Outreach          Spoke with Oscar for just a few moments, he said, it was a good time, but then said his wife is coming home today and he is currently getting training. I let him know, that I will give him a call at another time so he can continue his training. He thanked me and appreciated the support.     Care coordination will attempt to call in 1 week

## 2021-06-09 NOTE — PROGRESS NOTES
Clinic Care Coordination Contact  Care Team Conversations     Writer called [facility] and left a message with staff.  Asked for them to call writer back regarding coordination of care needed for start of home care services and discharge from facility for Bing.    Awaiting call back

## 2021-06-09 NOTE — TELEPHONE ENCOUNTER
Reason contacted:  Orders request  Information relayed:  Notified ok for requested order per Dr. Wheeler. Please advise if this is not ok.   Additional questions:  No  Further follow-up needed:  No  Okay to leave a detailed message:  No

## 2021-06-09 NOTE — TELEPHONE ENCOUNTER
Reason contacted:  Orders request  Information relayed:  Nancy notified ok for requested orders per Dr. Wheeler. Please advise if this is not ok.   Additional questions:  No  Further follow-up needed:  No  Okay to leave a detailed message:  No

## 2021-06-09 NOTE — TELEPHONE ENCOUNTER
Orders being requested: Skilled Nursing home visit 2 times a week for 1 week  Starting 7/8/2020 then 5 times a week for 1 week starting 7/12/2020 then 3 visits a week for 1 week starting 7/19/2020 then 2 visits a week for 1 week starting 7/26/2020with three prn visit starting 7/8/2020. PT/OT/ST to eval and treat.  [facility]HA 2 times a week for 4 weeks starting 7/12/2020 then 1 time a week for 4 weeks starting on 8/9/2020  for bathing and ADL's.  Medical Social Worker 2 PRN visit to help with finances and community resourses.    Reason service is needed/diagnosis: Skilled nurse will assist with enteral feeding management, med management, nursing assessment along with patient teaching.   When are orders needed by: AsAP  Where to send Orders: Phone:  [phone] Nancy Borja to leave detailed message?  Yes

## 2021-06-09 NOTE — PROGRESS NOTES
Clinic Care Coordination Contact  [facility]/Voicemail       Clinical Data: Care Coordinator Outreach  Outreach attempted x 1.  Left message on patient's voicemail with call back information and requested return call.  Plan:. Care Coordinator will try to reach patient again in 3-5 business days.      Note: Received a voicemail from Oscar. He stated that Bing is expected to come home next Tuesday and they are setting up home care.

## 2021-06-09 NOTE — PROGRESS NOTES
"Clinic Care Coordination Contact    Situation: Coordination of care for complex transition from nursing home to personal residence    Background: See 6/25/20 [facility] note.  Patient has lived at nursing home since 2018 and would like to move home with her spouse Ramon.    Barriers- medical needs, G-tube, medications, quarantine, learning of G-tube and feedings, learning needs of medications, coordination of care.    Assessment: Writer spoke with Home Care representative.  She stated they are over capacity for referrals and are currently vending.  Writer explained complexity of patients situation.  Explained that patient's spouse needs extensive training and coordination of care on the date of patients discharge from facility.  Home Care representative stating she would need to speak with management as they may not be able to go to the nursing home either.  Gave writers call back information.    Writer called and left a VM update for Jenifer medical director at nursing home.  Writer additionally left a VM update for spouse Ramon.  Writer stated on VM that 'home care did not currently have any openings and referral would hopefully take place next week'. \"no need for spouse to call back\"    Plan/Recommendations: Home Care will call CCC RN back with next steps.  If [facility] is able to assist with referral; it will not take place until next week.      A new verbal order for home care will need to be okayed at that time.  Home care will reach out to Dr. Wheeler when/if that is needed.      "

## 2021-06-09 NOTE — PROGRESS NOTES
[facility] Certification Request    March 13, 2017      Patient: Bing Rodrigues  MR Number: [id number]  YOB: 1950  Date of Visit: 3/13/2017      Dear Sudha Gill CNP,    Thank you for this referral.   We are seeing Bing Rodrigues for Physical Therapy of Left Hemiparesis and Abnormality of Gait.    Medicare and/or Medicaid requires physician review and approval of the treatment plan. Please review the plan of care and verify that you agree with the therapy plan of care by co-signing this note.      Plan of Care  Authorization / Certification Start Date: 03/13/17  Authorization / Certification End Date: 06/13/17  Authorization / Certification Number of Visits: 12  Communication with: Referral Source  Patient Related Instruction: Nature of Condition;Treatment plan and rationale;Self Care instruction;Basis of treatment;Body mechanics;Posture;Precautions;Next steps;Expected outcome  Times per Week: 1  Number of Weeks: 12  Discharge Planning: Home Program  Precautions / Restrictions : Poor exercise compliance  Therapeutic Exercise: Stretching;Strengthening  Neuromuscular Reeducation: posture;balance/proprioception;vestibular;stroke rehabilitation  Manual Therapy: other  Manual Therapy: as needed  Functional Training (ADL's): ADL's;compensatory training;instructions in transfers;ergonomics;safety procedures  Equipment: theraband    Goals:  Pt. will be able to walk : 20 minutes;for community mobility;in 12 weeks;Comment  Comment:: with good gait speed and no need to touch walls  Patient will transfer: for in/out of chair;in 12 weeks;Comment  Comment: without use of hands 8 reps         If you have any questions or concerns, please don't hesitate to call.    Sincerely,      Fior Lundberg, PT        Physician recommendation:     ___ Follow therapist's recommendation        ___ Modify therapy      *Physician co-signature indicates they certify the need for these services furnished within this plan  and while under their care.    [facility]   Initial Evaluation    Patient Name: Bing Rodrigues  Date of evaluation: 3/13/2017  Referral Diagnosis: Left-sided weakness, Abnormality of Gait  Referring provider: Sudha Gill CNP  Visit Diagnosis:     ICD-10-CM    1. Generalized muscle weakness M62.81    2. History of CVA (cerebrovascular accident) Z86.73    3. Benign Essential Hypertension I10    4. Chronic Kidney Disease, Stage 3 N18.3    5. Ataxic Gait R26.9    6. 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 Osteopenia M89.9     M94.9        Assessment:    Pt has late side effects of a right CVA which has resulted in left hemiparesis and loss of balance as well as ataxic gait. She has not been compliant with a home exercises program in the past, and combined with strength loss due to aging is at risk of falling, and is definitely affecting her ability to participate in home care and recreational outings such as shopping.   Pt. is appropriate for skilled PT intervention as outlined in the Plan of Care (POC).    Goals:  Pt. will be able to walk : 20 minutes;for community mobility;in 12 weeks;Comment  Comment:: with good gait speed and no need to touch walls  Patient will transfer: for in/out of chair;in 12 weeks;Comment  Comment: without use of hands 8 reps       Patient's expectations/goals are realistic.    Barriers to Learning or Achieving Goals:  Non- adherence to the home exercise program.  Chronicity of the problem.  Mental illness or emotional factors.  Memory/follow through       Plan / Patient Instructions:        Plan of Care:   Authorization / Certification Start Date: 03/13/17  Authorization / Certification End Date: 06/13/17  Authorization / Certification Number of Visits: 12  Communication with: Referral Source  Patient Related Instruction: Nature of Condition;Treatment plan and rationale;Self Care instruction;Basis of treatment;Body mechanics;Posture;Precautions;Next steps;Expected  outcome  Times per Week: 1  Number of Weeks: 12  Discharge Planning: Home Program  Precautions / Restrictions : Poor exercise compliance  Therapeutic Exercise: Stretching;Strengthening  Neuromuscular Reeducation: posture;balance/proprioception;vestibular;stroke rehabilitation  Manual Therapy: other  Manual Therapy: as needed  Functional Training (ADL's): ADL's;compensatory training;instructions in transfers;ergonomics;safety procedures  Equipment: theraband    Plan for next visit: Add Single leg stand and Heel raises, balance activites     Subjective:         Social information:   Living Situation:single family home, lives with others , stairs  with railing and has assistance Yes Lives with    Occupation:retired   Work Status:NA   Equipment Available: SE cane    History of Present Illness:    Bing is a 67 y.o. female who presents to therapy today with complaints of left sided weakness and progressive difficulty with walking and balance.(LEFS has declined from 7 months ago). She also reports problems eating and chewing hard food and previous swallow studies show difficulty with this activity. Initial medical condition was  when she suffered a CVA and had left sided weakness. [facility].  Patient does report all activities are limited due to left sided weakness and impaired balance for ADL's (see below). She does not drive ([employer] for transport), uses arms to assist in all sit to stand, must sit up to roll over in bed, and is unable to do any housecleaning or yardwork. She prefers not to use the cane at home, and says touching the walls is easier than using the cane. Pt denies any falls, but does have a concern for falling.    PMH: Htn, Ataxic Gait, Osteoporosis, Chronic Kidney Disease Stage 3, h/o cerebral Degenerative Ischemia, Late effects of CVA and Dysarthria. She also self reported vision problems and does wear glasses.    Pain Ratin}  Pain  rating at best: 0  Pain rating at worst: 0    Functional limitations are described as occurring with:   Walking even 50 feet slow and use walls or a SEC for balance.  Lifting  Sit to stand-needs to use arms  Rolling in bed  Reaching overhead with left hand  Walking on uneven ground  Squatting  Gardening and housework - unable  Performing routine daily tasks  Gripping with left hand  Recreational outings (only goes to [employer])      Patient reports previous PT was limited in benefit (was seen here August 2016), and she reports nothing has really restored her balance and eating post CVA. She does not really do any exercises issued on any of her visits at home.       Objective:      Patient Outcome Measures :    Lower Extremity Functional Scale (_/80): 24/80 (was 38/74 7 months ago)   Scores range from 0-80, where a score of 80 represents maximum function. The minimal clinically important difference is a positive change of 9 points.    Examination  1. Generalized muscle weakness     2. History of CVA (cerebrovascular accident)     3. Benign Essential Hypertension     4. Chronic Kidney Disease, Stage 3     5. Ataxic Gait     6. Late CVD Effects: Dysarthria     7. Osteopenia       Involved side: Bilateral  Posture Observation: Pt with flexed trunk, thoracic kyphosis, and tendency to stand with weight on the right leg    Sit to stand: from 17 inch seat is 0 reps (high falls risk) with no use of arms. Pt was able to complete 4 reps in 30 seconds from 20 inhes  Single leg stand: Right 2 seconds, left 0 seconds but is able to lift the leg off the ground.  Romberg (eyes open) : 30 seconds, minimal sway,  Romberg (eyes closed) minimal sway 30 seconds  Walking: Patient carries her SEC and prefers to touch the walls to walk down the blevins. Her step length is inconsistent, base of support is over 10 inches heel to heel (wide), she has decreased weight shift and decreased knee bend during swing, and short step length and slow speed.  No trunk sway or rotation is noted.    Lower extremity strength: left ankle DF 4-/5, Left quad 4/5  AROM left shoulder is 122 flexion, abduction 110.  AROM lower extremeties: WFL bilat   strength: Right 36# (nl 42#), Left 26# (36# nl)  Cranial nerves: delayed and saccadic eye m0tion to the right, otherwise seems intact         Treatment Today     TREATMENT MINUTES COMMENTS   Evaluation 35 CVA   Self-care/ Home management     Manual therapy     Neuromuscular Re-education     Therapeutic Activity     Therapeutic Exercises 25 Performed SL balance and sit to stand exercises- issued only sit to stand from 20 inches   Gait training     Modality__________________                Total 60    Blank areas are intentional and mean the treatment did not include these items.     PT Evaluation Code: (Please list factors)  Patient History/Comorbidities: Left sided hemiparesis  Examination: Weakness  and memory/Motivation/follow through  Clinical Presentation: Stable  Clinical Decision Making: Low    Patient History/  Comorbidities Examination  (body structures and functions, activity limitations, and/or participation restrictions) Clinical Presentation Clinical Decision Making (Complexity)   No documented Comorbidities or personal factors 1-2 Elements Stable and/or uncomplicated Low   1-2 documented comorbidities or personal factor 3 Elements Evolving clinical presentation with changing characteristics Moderate   3-4 documented comorbidities or personal factors 4 or more Unstable and unpredictable High             Fior Lundberg  3/13/2017  11:09 AM

## 2021-06-09 NOTE — PROGRESS NOTES
Assessment/Plan:     Patient presents today for a face-to-face visit prior to discharge from her facility.  I have been in contact with the physician seeing her at that facility, the manager of the facility, and our nurse here who helps coordinate care.    With significant coordination, we chose to initiate home health orders today as patient has known dysarthria, dysphagia, she is n.p.o., she has a G-tube and  needs training on how to use it, she has some ataxia and a history of significant and recurrent aspiration pneumonia, all from a history of a stroke.  She also has benign essential hypertension which is well controlled.    Plan will be to have patient seen for the first time by home health care in her current facility, coordination of discharge will be dependent on home care schedule, I encouraged her to discharge no earlier than Monday, July 6.    Had a long discussion today about the risks of care at home, the need to balance desire to be out of the facility with safety, removal of rugs to help prevent falling, potentially needing a home health nurses aide versus a PCA, and what to do when the , [place], needs to leave the house.    Anticipate needing additional resources in the future, patient will see me within the week of her discharge to ensure appropriate safety measures have been in place.    1. Benign Essential Hypertension  2. Oropharyngeal dysphagia  3. Aspiration pneumonia, unspecified aspiration pneumonia type, unspecified laterality, unspecified part of lung (H)  4. Urinary incontinence, unspecified type  5. Late CVD Effects: Dysarthria  6. CVA, old, dysarthria  7. Ataxic Gait  - Ambulatory referral to Home Health      Discontinued Medications:  There are no discontinued medications.    AVS printed and given to patient.  Return to clinic in ~2 weeks.    Total time spent with patient was 40 minutes with greater than 50% spent in face-to-face counseling regarding the above  plan.      This note has been dictated using voice recognition software. Any grammatical or context distortions are unintentional and inherent to the the software.     Binta Wheeler MD  Family Medicine [facility] [place]      Subjective:      Bing Rordigues is a 70 y.o. female who presents to clinic for nursing home discharge.     Patient is doing well.  She is eager to escape the facility.  She would like to live at home despite the increased risk to her health.  She is unable to manage her own medications with a G-tube, her  is currently unable to manage those as well and needs extensive training.    Discusses wanting to remove the rugs in her house in order to make walking more safe.    Patient's  discusses being concerned that patient might sneak food because she is n.p.o.  I specifically asked the patient about this and she declined any inclination to consume anything orally.  She did discuss wanting to occasionally use ice chips to help moisten her mouth and we discussed the risks but I think that occasional use with an attempt to avoid any swallowing of the water or ice chip itself, would be reasonable.    Patient's  seems at a loss as he is uncertain about patient's medications, how to manage the G-tube, or how to care for his wife.    Patient notes that her left great toe has a significant malformation of the nail likely secondary to fungus.      Past Medical History, Family History, and Social History reviewed.   Social History     Tobacco Use   Smoking Status Never Smoker   Smokeless Tobacco Never Used       Review of systems is as stated in HPI.  Patient endorses: nail concerns  The remainder of the 10 system review is otherwise negative.    Objective:     /70   Pulse 60   Temp 98.4  F (36.9  C)   Wt 136 lb (61.7 kg)   SpO2 95%   BMI 26.56 kg/m   Body mass index is 26.56 kg/m .    Gen: Alert, NAD, appears stated age, normal hygiene   MSK: Walks with a cane but able to  ambulate independently  Neuro: Obvious facial droop with dysarthria, but patient was able to make herself understood  Psych: no apparent hallucinations or delusions, no pressured speech; alert, oriented x3  Skin/nail: Significant malformation of patient's left great toenail, other toes also demonstrate evidence of fungal infection        Current Outpatient Medications on File Prior to Visit   Medication Sig Dispense Refill     acetaminophen (TYLENOL) 500 mg/15.62 mL solution 19.99 mL (640 mg total) by G-tube route every 4 (four) hours as needed for fever or pain.  0     amLODIPine (NORVASC) 5 MG tablet        aspirin 81 mg chewable tablet 1 tablet (81 mg total) by G-tube route daily.  0     calcium, as carbonate, (TUMS) 200 mg calcium (500 mg) chewable tablet 2 tablets (400 mg total) by G-tube route 4 (four) times a day as needed for heartburn.  0     cholecalciferol, vitamin D3, 1,000 unit tablet 1 tablet (1,000 Units total) by G-tube route daily.  0     citalopram (CELEXA) 10 MG tablet        ferrous sulfate 325 (65 FE) MG tablet 1 tablet       guar gum (BENEFIBER;NUTRISOURCE) Pack powder packet 2 packets by Enteral Tube route 2 (two) times a day.  0     guar gum (NUTRISOURCE FIBER ORAL) DISSOLVE 4 TEASPOONSFUL IN WATER AND GIVE PER G-TUBE TWICE DAILY       loperamide (IMODIUM) 1 mg/5 mL solution 10 mL (2 mg total) by G-tube route daily as needed for diarrhea.  0     melatonin 3 mg Tab tablet 1 tablet (3 mg total) by Enteral Tube route at bedtime as needed.  0     omeprazole (PRILOSEC) 2 mg/mL SusR suspension 10 mL (20 mg total) by Enteral Tube route daily before breakfast.  0     No current facility-administered medications on file prior to visit.

## 2021-06-09 NOTE — PROGRESS NOTES
Clinic Care Coordination Contact  Care Team Conversations        Disciplines Present:    The patient was discussed during weekly Care Conference Huddle today.   Goals and barriers were discussed and a plan was established.     Patient not currently enrolled to [facility]    Barriers: complex medication regimen, recently discharged to home from nursing home, caregiver learning new medications via feeding tube, feeding via feeding tube    Action Plan if indicated: Patient to have scheduled to have [facility] SW Assessment 7/28/20 @ 10:00 to offer resources in the home, community support, PCA, etc.    Plan/Follow up needed: Nursing home is holding bed for Bing at previous facility for 30 days from date of discharge if patient were to need to return to facility.    [facility] medical director [phone]

## 2021-06-09 NOTE — TELEPHONE ENCOUNTER
Orders being requested: Agency is requesting a verbal order for delayed start of care to 7/8/20 per Pt request.  When are orders needed by: asap  Where to send Orders: Phone:  call Nancy at [phone]  Okay to leave detailed message?  Yes

## 2021-06-09 NOTE — PROGRESS NOTES
Bing is a 67 y.o. female presenting to the clinic for medication management and other concerns.  Patient has a history of a CVA in 2003.  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  Patient ambulates with a cane.  She does not like to use her cane at home.  She does have a history of falls.  States her left arm does not abduct well.  She would like to learn how to walk straighter.  She is also told that she cannot drive and she would like to resume driving.  She has a history of physical therapy at the [facility] with minimal improvement.  Patient has also been experiencimg dysphagia.  States she can only eat soft foods.  When  she eats more solid foods, she will cough and have the sensation that she is choking.  She takes small sips of fluids.  She does have a history of having a swallow study performed in 2014.  She would like to eat solid foods again.  Patient has hypertension which is controlled with lisinopril and amlodipine.    Review of Systems: A complete 14 point review of systems was obtained and is negative or as stated in the history of present illness.    Social History     Social History     Marital status:      Spouse name: N/A     Number of children: N/A     Years of education: N/A     Occupational History     Not on file.     Social History Main Topics     Smoking status: Never Smoker     Smokeless tobacco: Not on file     Alcohol use No     Drug use: Not on file     Sexual activity: No     Other Topics Concern     Not on file     Social History Narrative       Active Ambulatory Problems     Diagnosis Date Noted     Urinary Incontinence      Vitamin D Deficiency      Benign Essential Hypertension      Late CVD Effects: Dysarthria      Chronic Kidney Disease, Stage 3      Osteopenia      CVA, old, dysarthria      Generalized Ischemic Cerebrovascular Disease With Cerebral Degeneration      Ataxic Gait      Difficulty Swallowing (Dysphagia)      Speech Language Dysphasia / Aphasia      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      Joint Pain Fingers      Closed Fracture Of The Distal Phalanx Of The Left Third Finger      Resolved Ambulatory Problems     Diagnosis Date Noted     No Resolved Ambulatory Problems     Past Medical History:   Diagnosis Date     Hypertension        Family History   Problem Relation Age of Onset     No Medical Problems Mother      Cancer Father      No Medical Problems Sister      No Medical Problems Daughter      Stroke Maternal Grandmother      Stroke Maternal Grandfather      Early death Paternal Grandmother      COPD Paternal Grandfather      No Medical Problems Maternal Aunt      No Medical Problems Paternal Aunt      BRCA 1/2 Neg Hx      Breast cancer Neg Hx      Colon cancer Neg Hx      Endometrial cancer Neg Hx      Ovarian cancer Neg Hx        OBJECTIVE:     Visit Vitals     /60 (Patient Site: Right Arm, Patient Position: Sitting, Cuff Size: Adult Regular)     Pulse 82     Ht 5' (1.524 m)     Wt 122 lb 11.2 oz (55.7 kg)     SpO2 97%     BMI 23.96 kg/m2       Patient is alert, in no obvious distress.   Skin: Warm, dry.  No lesions or rashes.  Skin turgor rapid return.   HEENT:  Head normocephalic, atraumatic.  Eyes normal.  Ears normal.  Nose patent, mucosa pink.  Oropharynx mucosa pink.  No lesions or tonsillar enlargement.   Neck: Supple, no lymphadenopathy.  Lungs:  Clear to auscultation. Respirations even and unlabored.  No wheezing or rales noted.   Heart:  Regular rate and rhythm.  No murmurs, S3, S4, gallops, or rubs.    Abdomen: Soft, nontender.  No organomegaly. Bowel sounds normoactive. No guarding or masses noted.   Musculoskeletal: Patient ambulates with a cane and has difficulty getting on the exam table.    ASSESSMENT AND PLAN:     1. Benign Essential Hypertension  amLODIPine (NORVASC) 10 MG tablet    lisinopril (PRINIVIL,ZESTRIL) 40 MG tablet   2. Dysphagia, unspecified type  Ambulatory referral to Gastroenterology   3. Left-sided weakness   Ambulatory referral to Physical Therapy   4. Ataxic Gait  Ambulatory referral to Physical Therapy   5. Medication management  Basic Metabolic Panel   6. Health care maintenance  Mammo Screening Bilateral     Blood pressure is controlled with amlodipine and lisinopril.  Will check BMP.  For dysphagia will refer to gastroenterology for further evaluation.  For left-sided weakness and unsteady gait, will refer to physical therapy.  She is content with the plan.  I encouraged her to schedule a physical with fasting labs with Dr. Barrios within the next few months.

## 2021-06-09 NOTE — PROGRESS NOTES
[facility] Daily Progress     Patient Name: Bing Rodrigues  Date: 3/20/2017  Visit #:   Referral Diagnosis: Left-sided weakness, Abnormality of Gait  Referring provider: Isabel Barrios MD  Visit Diagnosis:     ICD-10-CM    1. Generalized muscle weakness M62.81    2. History of CVA (cerebrovascular accident) Z86.73    3. Benign Essential Hypertension I10    4. Chronic Kidney Disease, Stage 3 N18.3    5. Ataxic Gait R26.9    6. 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 Osteopenia M89.9     M94.9          Assessment:     Patient demonstrates understanding/independence with home program.  Patient is benefitting from skilled physical therapy and is making steady progress toward functional goals.    Goal Status:  Pt. will be able to walk : 20 minutes;for community mobility;in 12 weeks;Comment  Comment:: with good gait speed and no need to touch walls  Patient will transfer: for in/out of chair;in 12 weeks;Comment  Comment: without use of hands 8 reps       Plan / Patient Education:   Add SL stand next visit, and add wall rolls?, gait/balance activites  Continue with initial plan of care.  Progress with home program as tolerated.    Subjective:     Pain Ratin  I tried to do the sit to stand on my sofa, and I had trouble getting up      Objective:     Pt may be starting from too low of a surface, and we talked about using a pillow or second cushion to add height.  Hip, knee and ankle flexibility is basically symmetrical. She does have about 5 degrees tightness of left ITB, and 2 degrees tightness of left heelcord.    Pt tends to walk stiff legged and was asked to lift feet off ground 2 inches and bend knees more.    Treatment Today     TREATMENT MINUTES COMMENTS   Evaluation     Self-care/ Home management     Manual therapy     Neuromuscular Re-education     Therapeutic Activity     Therapeutic Exercises 60 Did supine bilat hip, knee, and ankle AROM with verbal assist. Also did exercises per flow  sheet.   Gait training     Modality__________________                Total 60    Blank areas are intentional and mean the treatment did not include these items.       Fior Lundbegr  3/20/2017

## 2021-06-09 NOTE — TELEPHONE ENCOUNTER
Received a phone call from patients  today who states he is returning a phone call to Kathy in regards to a patient and was given telephone number [phone] to call. Will send to care management team to call patients  back.

## 2021-06-09 NOTE — TELEPHONE ENCOUNTER
Left message to call back for: orders request  Information to relay to patient:  Notified ok for requested order per Dr. Wheeler via VM. Please advise if this is not ok.

## 2021-06-09 NOTE — PATIENT INSTRUCTIONS - HE
Call Kathy to find out about resources:  [phone].    Continue to monitor the area of impact from the fall. Let me know or go to the hospital if she feels sick in any way.

## 2021-06-09 NOTE — TELEPHONE ENCOUNTER
2 total orders:      Orders being requested: Physical therapy for once a week for 6 weeks  Reason service is needed/diagnosis: gait, balance, and strength    Orders being requested: Occupational therapy for twice a week for 3 weeks  Reason service is needed/diagnosis: Home safety and ADL training      When are orders needed by: as soon as possible      Where to send Orders: Phone:  [phone]  Okay to leave detailed message?  Yes

## 2021-06-09 NOTE — TELEPHONE ENCOUNTER
Orders being requested: Speech Therapy, one time a week for four weeks.  Reason service is needed/diagnosis: Swallowing strength  When are orders needed by: Today  Where to send Orders: Phone:  Verbal order is ok  Okay to leave detailed message?  Yes

## 2021-06-10 NOTE — TELEPHONE ENCOUNTER
Refill Request  Did you contact pharmacy: Yes  Medication name:   Requested Prescriptions     Pending Prescriptions Disp Refills     omeprazole (PRILOSEC) 2 mg/mL SusR suspension 300 mL 3     Sig: 10 mL (20 mg total) by Enteral Tube route daily before breakfast.     Who prescribed the medication: Binta Wheeler MD   Requested Pharmacy: [facility] #[id number]  Is patient out of medication: No.  1 days left  Patient notified refills processed in 3 business days:  no  Okay to leave a detailed message: yes  [phone]

## 2021-06-10 NOTE — PROGRESS NOTES
"Clinic Care Coordination Contact    Situation:  Spoke with spouse Ramon today for Enrollment to [facility]    Background: Bing discharged from Nursing Home to primary residence.  Ramon now primary caregiver.     Assessment:  Ramon stating he called [facility] yesterday to switch Bing's medications to mail service.  Her medication reorders will not be sent to them for 7-10 days.  Writer reviewed all medications in the home with Ramon.  Spouse stating he has '2 days remaining of amlodipine\", \"4 days remaining of citalopram\".  Patient does not have any omeprazole susp in the home.  Writer explained to patient that he would may have to pay cash out of pocket for these medications. (determined by [facility]).  Ramon is okay with this and stated an understanding.  He is asking for a 10 day supply.  Writer got patient to agree to a 7 day supply.  [facility] RN can then reassess next week as to status of [facility] pharmacy order and see if additional out of pocket medications are needed.      Additionally patient stating he is unsure how to order tube feeding cans and bags.  Previously received from [facility] at nursing home.  However patient unable to order directly from there.  Ramon stating Bing still has 1 month's worth of cans and 9 bags.  (uses bags for 48-72 hours).  Will ask PCP to send scripts to [facility] for both.  Patient obtaining feedings via [facility].    Plan/Recommendations: PCP please write 7 day scripts for Amlodipine and Citalopram. Send to [facility].  All future scripts will need to be sent to [facility] in Pharmacy.  Please also send scripts for feedings and bags to [facility].          "

## 2021-06-10 NOTE — PROGRESS NOTES
Clinic Care Coordination Contact    Situation: Patient chart reviewed by care coordinator.    Background: Patient enrolled 7/28/20.  Spouse seeking assistance with support of transition from nursing home to home.    Assessment: Spouse stating omeprazole suspension cost was $190/month.  Patient had questions of how to administer omeprazole capsules via G-tube.  Writer instructed patient to open capsules and add to 30 cc of warm water (not hot).  Ensure to flush after medication given as to avoid G-tube being clogged from granules.  Oscar stating an understanding.      Discussed possible Prior Authorization for omeprazole suspension.  Instructed Oscar to continue with capsules as insurance still may not cover the suspension.  Writer previously consulted with [facility] regarding this patient.  Spouse open to having a telephone consult with her regarding medications.    Plan/Recommendations: Could PCP place a referral to [facility]-writer previously discussed with Mila.    Unable to afford omeprazole suspension and having difficulty with capsules.  Would a Prior Auth help with this, or meet with [facility] first.  Please advise.

## 2021-06-10 NOTE — TELEPHONE ENCOUNTER
Who is calling:  Shania   Reason for Call:  Caller stated that she had questions on regard to food for the patient. Caller is needing to know how many cases.   Date of last appointment with primary care:   Okay to leave a detailed message: Yes  [phone]

## 2021-06-10 NOTE — PROGRESS NOTES
Clinic Care Coordination Contact  Care Team Conversations     Call to [facility] answering service.  Writer asking for home care nurse to call back regarding inventory of Bing's home medications, tube feeding brand and bags.  Writer to possibly add more nursing orders due to Bing running out of medications and in need of assistance to manage for 1 week.

## 2021-06-10 NOTE — PROGRESS NOTES
Clinic Care Coordination Contact    Situation:  Called spouse Ramon and [facility] to confirm  of Amlodipine and Citalopram    Background: Spouse Ramon transferred all medications to [employer] mail to home Monday.  This left Bing with only 2 days of Amlodipine and Citalopram.  Please read previous notes for further information.    Assessment: Ramon stating [facility] has not received script yet.  Writer called [facility] and confirmed they have not received script.  Writer able to see prescription on file for 7 day supply as requested.    Plan/Recommendations: Could PCP's team please resubmit 7 day scripts of Amlodipine and Citalopram asap to [facility].    Thank you

## 2021-06-10 NOTE — TELEPHONE ENCOUNTER
Medication Question or Clarification  Who is calling:   What medication are you calling about (include dose and sig)?:   omeprazole (PRILOSEC) 2 mg/mL SusR suspension  300 mL  3  8/14/2020      Sig - Route: 10 mL (20 mg total) by Enteral Tube route daily before breakfast. - Enteral Tube     Sent to pharmacy as: omeprazole 2 mg/mL SusR suspension     E-Prescribing Status: Receipt confirmed by pharmacy (8/14/2020  3:22 PM CDT        Who prescribed the medication?: Binta Wheeler MD    What is your question/concern?:  is calling to clarify if he is to wait to give the medication once this liquid prescription arrives or did the Dr want him to continue using the pill forms until this liquid one has arrived.  Please clarify    was reminded that he is not on the  Patient;s consent to communicate, and that this needs to be addressed on the next office visit.  Requested Pharmacy: NA  Okay to leave a detailed message?: Yes

## 2021-06-10 NOTE — PROGRESS NOTES
"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     Joint Pain Fingers     Closed Fracture Of The Distal Phalanx Of The Left Third Finger       Onset date: 3/31/2017 (order date)  Reason for evaluation: Concern for dysphagia  Pertinent History: Multiple CVAs with residual dysarthria and left-sided weakness  Current Diet: NDD3 textures and thin liquids (based on patient's description)  Baseline Diet: Same as current diet    Patient was referred by Azalia Rowe PA-C with [facility], due to concerns of dysphagia.  Patient reports having experienced difficulty with her swallow since her stroke(s) in 2013.  She c/o a sensation of foods and liquids \"getting stuck\" in her throat.  She states that she had a previous Video Swallow Study a couple of years ago.  A review of past records reveals that patient had previous studies on 7/12/13, 8/13/13l, and 1/15/14; the most recent of which was [facility].  At that time, she was advised to utilize a chin tuck prior to swallowing liquids.  The purpose of today's study is to re-evaluate the oropharyngeal phase of patient's swallow.  Patient also participated in an esophagram during this visit.  Please refer to separate report for details.    Patient presents as alert and cooperative, though mildly confused, during this evaluation.  She appears to be a rather poor historian.  Patient arrived to study unaccompanied by family.  She stated that her  is working today.      An  was not " applicable    Patient was given honey-thick, puree, nectar-thick, and thin consistencies of barium.    Oral Phase:    Dentition/Oral hygiene: Patient is missing her lower molars, as well as several upper teeth.  Oral hygiene was adequate.    Bolus prep and oral control was mildly to moderately impaired/dyscoordinated.  Tongue pumping was utilized to initiate posterior bolus movement.    Anterior-Posterior transit was mildly to moderately impaired/delayed.    Premature spillage to the valleculae occurred with thin liquid.    Tongue base retraction was not impaired.    Oral stasis did not occur with any consistency.    Pharyngeal Phase:    Aspiration did not occur with any consistency during this study.     Laryngeal penetration occurred inconsistently with honey-thick, nectar-thick, and thin liquid.  This was shallow and transient in nature.    Swallow response was intermittently delayed with thin liquid, resulting in inconsistent pourover into the pyriform sinuses.    Epiglottic movement was complete consistently across texture trials, though mildly delayed.  This resulted in intermittent laryngeal penetration with honey-thick, nectar-thick, and thin liquid.    Mild stasis occurred in the pyriform sinuses with honey-thick liquid and puree consistency.  This cleared when patient completed spontaneous dry swallows.    Pharyngeal constriction was mildly impaired.    Hyolaryngeal elevation was slow. Hyolaryngeal excursion was slow.    Cricopharyngeal function was not impaired. Cervical esophageal function was not impaired.    Assessment:    Patient demonstrated mild oral and mild pharyngeal dysphagia.    Patient is at low aspiration risk with all intake.    Rehab potential is good based on prior level of function and evaluation results.    Recommendations:    Plan: Patient to continue current diet of NDD3 textures and thin liquids as tolerated    Strategies: Patient to follow standard safe swallowing precautions,  including sitting fully upright for all intake, eat slowly, and taking small bites & sips.    Speech Therapy is not recommended at this time    Referrals: N/A     Reviewed history of swallow problem with patient.  Verbally explained role of SLP and radiologist.  Verbally explained process of VFSS prior to administration of barium.  Verbally explained results and recommendations to patient.  SLP answered questions and stated that a written copy of test will be faxed to her referring provider.  Patient verbalized understanding.    40 dysphagia minutes    Brisa Mariano MA, CCC-SLP

## 2021-06-10 NOTE — TELEPHONE ENCOUNTER
calling.  She has bad stomach ache on feeding tube.  She rates it as not too bad today.  She is not constipated and has been pooping.    Took her omeprazole in capsule form, did not have the liquid.  The pill form does not work as well for her even though he is opening them and pouring them out of the capsules.    Maybe a prior authorization??? I did explain to her  what a PA was and he has never had to have one before.  He does think this would benefit her.  He does think the capsules he opens and pours in her feeding tube are not as effective as the liquid form of the medication would be.      Renita Hale RN [facility] Triage Nurse Advisor        Additional Information    Negative: Passed out (i.e., fainted, collapsed and was not responding)    Negative: Shock suspected (e.g., cold/pale/clammy skin, too weak to stand, low BP, rapid pulse)    Negative: Sounds like a life-threatening emergency to the triager    Negative: Chest pain    Negative: Pain is mainly in upper abdomen (if needed ask: 'is it mainly above the belly button?')    Negative: Abdominal pain and pregnant > 20 weeks    Negative: Abdominal pain and pregnant < 20 weeks    Protocols used: ABDOMINAL PAIN - FEMALE-A-OH

## 2021-06-10 NOTE — PROGRESS NOTES
Clinic Care Coordination Contact  Care Team Conversations     Called and spoke with Bing's spouse Ramon.  He confirmed the medications from [facility] mail in pharmacy arrived.  He confirmed homecare is still coming and assisting.    Writer reiterated and confirmed [facility] appointment tomorrow 8/6/20 @ 2:00 via telephone  Spouse stated an understanding.

## 2021-06-10 NOTE — TELEPHONE ENCOUNTER
Central PA team  [phone]  Pool: HE PA MED ([id number])          PA has been initiated.       PA form completed and faxed insurance via Cover My Meds     Key:  [id number]     Medication:  OMEPRAZOLE 2MG/ML    Insurance:  [employer]        Response will be received via fax and may take up to 5-10 business days depending on plan

## 2021-06-10 NOTE — TELEPHONE ENCOUNTER
RN Triage:    Bing's  is calling with a question regarding omeprazole administration via G-tube.  She was getting it in a liquid form, but the insurance coverage changed and now it costs over $100.  Triage nurse gave suggestions as to how to administer the omeprazole via capsule form.  Call ended abruptly because he had to take another call.    Pat Shi RN  [facility] Nurse Advisor

## 2021-06-10 NOTE — PROGRESS NOTES
"Clinic Care Coordination Contact    Situation: Call received from [facility] home care nurse    Background: Patient enrolled for coordination of care for transition from nursing home to personal residence    Assessment: Confirmed DME supply items needed in the home of tube feedings Peptamen 1.5 @ 40 cc/hr per nursing home discharge paperwork and confirmed with home care nurse, keyla Huffdkangely Matt Enteral feeding pump set 1000 ml 1\" set, 12 cc syringes monoject enteral syringe enfit female and 60 cc syringes monoject enteral syringe enfit female.  Per home care nurse patient is not doing feeding boluses.  Patient to f/u with [facility] regarding feedings.    All DME items updated in chart for physician to order and CMA to fax to [employer].    Home care nurse also confirming Bing has all medications on med list except for amlodipine and citalopram.  Writer reiterated that a 7 day supply was sent to [facility] yesterday.      Plan/Recommendations:  Home care RN will continue orders to assist with medication management and follow plan of care for 2 weeks plus 3 PRN visits or as long as deemed medically necessary.            "

## 2021-06-10 NOTE — PROGRESS NOTES
"Bing Rodrigues is a 70 y.o. female who is being evaluated via a billable telephone visit.      The patient has been notified of following:     \"This telephone visit will be conducted via a call between you and your physician/provider. We have found that certain health care needs can be provided without the need for a physical exam.  This service lets us provide the care you need with a short phone conversation.  If a prescription is necessary we can send it directly to your pharmacy.  If lab work is needed we can place an order for that and you can then stop by our lab to have the test done at a later time.    Telephone visits are billed at different rates depending on your insurance coverage. During this emergency period, for some insurers they may be billed the same as an in-person visit.  Please reach out to your insurance provider with any questions.    If during the course of the call the physician/provider feels a telephone visit is not appropriate, you will not be charged for this service.\"    Patient has given verbal consent to a Telephone visit? Yes    What phone number would you like to be contacted at? [phone]     Patient would like to receive their AVS by AVS Preference: Brando.    Additional provider notes: follow up    Patient sustained a gentle fall from her couch onto a carpeted floor but also onto the edge of the structure of the furniture which caused significant bruising on July 15. The home health nurse was concerned about the induration and she followed up with me on 7/21. The induration was still present but without obvious erythema (although possibly compromised by the extensive bruising). She had no symptoms of infection at that time and her cbc had a normal white count.     Discussing her care with her  today, he endorses she has no pain from the fall and no signs of infection. Specifically she has no fever and feels well. Apparently no one has inspected the area but  " "palpated it during the phone call and believes it is still hard but not worse. She has had no further falls.    We also talked at last appointment about 's feeling of being overwhelmed in managing the medications.  He does state that he feels \"fine\".  When asked with change he states that he \"just feels better about it\".  She is receiving multiple home health services.      Assessment/Plan:  1. Fall, sequela  Without evidence of infection, I do not feel that the induration is to be aggressively managed.  I think that it can be monitored at home.  Patient has had no further falls and her environment appears safer.  I think that patient can reach out to me if symptoms worsen.      With respect to the medication management,  now feels competent to take care of his wife.  I still have asignificant reservations given at the last appointment he endorsed giving her foot powder in her G-tube.  However, she is getting significant oversight from home health services and at this juncture he had no concerns and medications so we said that she should follow-up in 1 month.    Phone call duration:  5 minutes    Binta Wheeler MD    "

## 2021-06-10 NOTE — PROGRESS NOTES
Clinic Care Coordination Contact  Care Team Conversations     Spoke with Ramon.  He stated he was able to  the 7 day prescriptions for amlodipine and citalopram yesterday from [facility].  He stated insurance did cover the cost and he did not have to pay out of pocket.  Writer stating I would call next Wednesday to inquire if [facility] mail to home medications have arrived yet.  Otherwise patient will need additional scripts to cover during this transition.    Spouse stated an understanding.

## 2021-06-10 NOTE — PROGRESS NOTES
[facility] Daily Progress     Patient Name: Bing Rodrigues  Date: 2017  Visit #:   Referral Diagnosis: Left-sided weakness, Abnormality of Gait  Referring provider: Isabel Barrios MD  Visit Diagnosis:     ICD-10-CM    1. Generalized muscle weakness M62.81    2. History of CVA (cerebrovascular accident) Z86.73    3. Benign Essential Hypertension I10    4. Chronic Kidney Disease, Stage 3 N18.3    5. Ataxic Gait R26.9    6. 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 Osteopenia M89.9     M94.9          Assessment:     Pt improved with transfers,  but has still high guard position and no arm swing as well as slow stiff legged gait. We are working on gait speed and step length presently    Goal Status:  Pt. will be able to walk : 20 minutes;for community mobility;in 12 weeks;Comment  Comment:: with good gait speed and no need to touch walls  Patient will transfer: for in/out of chair;in 12 weeks;Comment  Comment: without use of hands 8 reps     Pt is improving sit to  technique of improved posture and bending knees, but still uses hands from 17 inch standard seat height.  She is able to stand and exercises for 10 minutes without loss of balance.    Plan / Patient Education:     Do 6 minute walk test next time    Subjective:     Pain Ratin  Pt reports it is hard to do the sit to stand at home as patient cannot find the proper surface. I suggested the bed as this would at least give the proper eight, but she would not be able to get her feet underneath her as well as in the clinic      Objective:     Pt states she does have to climb stairs as she lives on the second floor of [facility]- no elevator. She also climb bus steps for [employer].  Pt does have some core ataxia and very little arm swing when walking, asymmetrical step length and weight shift- often shuffling left leg. Gait speed is extremely slow and will take one minute to walk 100 feet.  We tried standing arm swing diagonals  which she could do, but got LBP when trying side stretches.  SL balance: left o sec, right 1 second  Treatment Today     TREATMENT MINUTES COMMENTS   Evaluation     Self-care/ Home management     Manual therapy     Neuromuscular Re-education     Therapeutic Activity     Therapeutic Exercises 45 Ex per flow sheet   Gait training 15 Gait and balance activities   Modality__________________                Total 60    Blank areas are intentional and mean the treatment did not include these items.       Fior Lundberg  4/24/2017

## 2021-06-10 NOTE — PROGRESS NOTES
[facility] Daily Progress /Monthly Summary    Patient Name: Bing Rodrigues  Date: 2017  Visit #:   Referral Diagnosis:Left-sided weakness, Abnormality of Gait  Referring provider: Isabel Barrios MD  Visit Diagnosis:     ICD-10-CM    1. Generalized muscle weakness M62.81    2. History of CVA (cerebrovascular accident) Z86.73    3. Benign Essential Hypertension I10    4. Chronic Kidney Disease, Stage 3 N18.3    5. Ataxic Gait R26.9    6. 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 Osteopenia M89.9     M94.9          Assessment:     LE strength is improving, as indicated by sit to stand test done today. Still walks stiff knee - especially on the left. She still tend to walk with wall touch.    Goal Status:  Pt. will be able to walk : 20 minutes;for community mobility;in 12 weeks;Comment  Comment:: with good gait speed and no need to touch walls  Patient will transfer: for in/out of chair;in 12 weeks;Comment  Comment: without use of hands 8 reps     Gait speed is still slow and she can walk up to 15 minutes using cane or touching wall. Left knee is very stiff, and step length is unequal.  Transfer from chair height of 17 inches heeds arm assist, but from 21  Inches is independant      Plan / Patient Education:     Continue with initial plan of care.  Progress with home program as tolerated.   Continue to progress one leg activities and sit to stand    Subjective:     Pain Ratin  I have been able to do the exercises, but the knee squeeze with the towel was hard      Objective:     Lower Extremity Functional Scale (_/80): 32 (was 24/80 one month ago)   Sit to stand test: from 17 inches 0 reps, and from 21 inches 9 reps in 30 seconds  SL stand for 30 seconds per leg with 2 hand support -2inches      Treatment Today     TREATMENT MINUTES COMMENTS   Evaluation     Self-care/ Home management     Manual therapy     Neuromuscular Re-education     Therapeutic Activity     Therapeutic Exercises      Gait training     Modality__________________                Total 60    Blank areas are intentional and mean the treatment did not include these items.       Fior Lundberg  4/17/2017

## 2021-06-10 NOTE — TELEPHONE ENCOUNTER
Medication Question or Clarification  Who is calling: Pharmacy  What medication are you calling about (include dose and sig)?:   omeprazole (PRILOSEC) 2 mg/mL SusR suspension  300 mL  3  8/13/2020      Sig - Route: 10 mL (20 mg total) by Enteral Tube route daily before breakfast. - Enteral Tube     Sent to pharmacy as: omeprazole 2 mg/mL SusR suspension         Who prescribed the medication?: Dr Wheeler  What is your question/concern?: Pharmacy states insurance will not pay for this medication.  Need to either send alternative, or start a P.A.  Requested Pharmacy: [facility]  Okay to leave a detailed message?: Yes

## 2021-06-10 NOTE — TELEPHONE ENCOUNTER
FYI - Status Update  Who is Calling: Spouse  Update: Ramon was checking to see if Binta Wheeler MD sent the citalopram in yet. Caller was informed the covering provider wanted this request to be addressed by Binta Wheeler MD tomorrow, when she's in the office. Caller stated he will wait for a response tomorrow.  Okay to leave a detailed message?:  No

## 2021-06-10 NOTE — TELEPHONE ENCOUNTER
Medication Question or Clarification  Who is calling: patient spouse   What medication are you calling about (include dose and sig)?:    Disp  Refills  Start  End     guar gum (NUTRISOURCE FIBER ORAL)         Sig: DISSOLVE 4 TEASPOONSFUL IN WATER AND GIVE PER G-TUBE TWICE DAILY     Class: Historical Med         Who prescribed the medication?: Binta Wheeler MD   What is your question/concern?: Caller stated that he was not able to find the medication but was able to fine a different one of Metamucil and was wondering if that works the same?  Requested Pharmacy: n/a  Okay to leave a detailed message?: Yes

## 2021-06-10 NOTE — TELEPHONE ENCOUNTER
Yes, omeprazole capsules can be given via g-tube when opened and mixed with water.     Contents of capsule should be mixed with 20 mL of water, then flushed with an additional 10 mL of water as the granules do not dissolve into solution very well.       Anna Bedolla, PharmD, BCACP  Medication Management (MTM) Pharmacist  [facility]

## 2021-06-10 NOTE — TELEPHONE ENCOUNTER
Patient Returning Call  Reason for call:   returning call to check on the status of this request. Patient gave verbal approval to talk to .  Information relayed to patient:  agrees to have the Nutrisource as a Rx so the pharmacy can order this and request this to happen ASAP.  Patient has additional questions:  No  If YES, what are your questions/concerns:  none  Okay to leave a detailed message?: Yes

## 2021-06-10 NOTE — PROGRESS NOTES
Clinic Care Coordination Contact    Community Health Worker Follow Up    Goals:   Goals Addressed                 This Visit's Progress       General      COMPLETED: Medical (pt-stated)   100%     Goal Statement: I would like to obtain my medical supplies (tube feedings, bags, syringes) from [employer] in the next 30-60 days.  Date Goal set: 7/29/20  Barriers: coordination of care, complex medically  Strengths:   Date to Achieve By: ongoing  Patient expressed understanding of goal: yes    Personal Action Step    I will continue to receive Medical supplies from [employer] [facility].   I will have my  reach out to my Provider when we need refills.     Updated:8/19/2020        COMPLETED: Medication 1 (pt-stated)   100%     Goal Statement: I would like to obtain Tube Feeding bags and Tube Feeding cans from a new DME company covered by my insurance in the next 30 days  Date Goal set: 7/28/20  Barriers: lack of resources, coordination of care, complex medically  Strengths: motivated to learn  Date to Achieve By: 30 days  Patient expressed understanding of goal: spouse stated an understanding    Personal Action Step    We have been able to obtain Feeding tube supplies  I will continue to go through [employer] for Tube feeding bags and Tube feeding cans.     Updated: 8/19/2020        Medication 1 (pt-stated)   90%     Goal Statement: I would like to transfer my medications from [facility] to [facility] mail to home pharmacy in the next 2-4 weeks.  Date Goal set: 7/28/20  Barriers: coordination of care, medically complex  Strengths: motivated  Date to Achieve By: ongoing  Patient expressed understanding of goal: discussed with spouse    Action steps to achieve this goal:  1. Spouse spoke with [employer] mail [facility] home pharmacy yesterday to start the process.(completed)  2. I will have my  answer the phone when the Kathy RN calls to check on the status of medication  inventory.   3. CCC RN will call home care nurse to check on status of spouse knowledge of medications.      Updated: 8/19/2020        COMPLETED: Other (pt-stated)        Goal Statement: I would like information to identify community resources for private pay RN care (long term)    Date Goal set: 7/28/20  Barriers: medically complex, lack of resources, coordination of care  Strengths: motivated  Date to Achieve By: ongoing  Patient expressed understanding of goal: discussed with spouse    Personal Action step  My  has declined private pay RN care due to cost.     Updated: 8/19/2020         further on what steps he needs to take if something was to happen. Appointment scheduled 8/21/2020 @ 10am.         Intervention and education during outreach:  Spoke with patients  Oscar today.He shared that they have been able to obtain tube feeding supplies from [employer] and the cost isn't bad.  He did say, that they looked into private pay nursing, and he has declined this due to the out of pocket cost around $3,000 a month.  Oscar, mentioned that they have been receiving her medications now through [employer] and this has been helpful having them delivered to the home.  He said, he will soon need to call in for additional medications.  Oscar stated, that he is having a hard time with one of the medications He said, they told him to dissolve the pill in water, but he said, it doesn't dissolve. He couldn't tell me the name of the medication.  I let him know, I would connect with Kathy RN to have her call him to troubleshoot. He thanked me.    Near the end of the call, Oscar said, what happens if something was to happen to me. I asked if they had a living will and health directive.  He said, no. He asked if he could speak with the  to discuss thisGoals were updated, [facility] Care team can add additional goals     CHW Plan:  CHW will route CCC RN for Support on troubleshooting the  medication  CHW will follow up in 1 month

## 2021-06-10 NOTE — TELEPHONE ENCOUNTER
Patient Returning Call  Reason for call:  Shania called back  Information relayed to patient:  Informed of message listed below.  Shania states she faxed a CMN form for enteral pump, IV pole and how many cans does patient need to take a day.  States this is all on the form to fill out and fax back.  Will fax form to clinic again today.  Patient has additional questions:  Yes  If YES, what are your questions/concerns:  As above.  Okay to leave a detailed message?: Yes

## 2021-06-10 NOTE — TELEPHONE ENCOUNTER
They are similar but also slightly different :) They are both two forms of soluble fiber but have somewhat different properties. So they are not exactly interchangable.     The Nutrisource is soluble fiber from guar gum and Metamucil is solube fiber from psyllium husk. The psyllium does seem to work better for motility disorders such as constipation and irritable bowel syndrome. The guar gum however is better as a thickener -- are they using the guar gum (Nutrisource) as a thickener for feeds or for constipation?     Also, if they cant find the Nutrisource, do they want us to send a prescription and maybe a pharmacy could order it for them?     Mila Brunner, Pharm.D., BCACP  Medication Therapy Management Pharmacist  [facility]

## 2021-06-10 NOTE — TELEPHONE ENCOUNTER
"Left message to call back for: order clarification  Information to relay to patient:  What does Shania need to know? What \"food\" does she have questions about?     "

## 2021-06-10 NOTE — TELEPHONE ENCOUNTER
Mila could you look at this?   Insurance will not cover the liquid omeprazole but will cover omeprazole 20 mg capsules. I believe she uses this via g-tube       Is there a way to use the omeprazole capsules via g-tube?

## 2021-06-10 NOTE — PROGRESS NOTES
[facility] Daily Progress /Monthly Summary    Patient Name: Bing Rodrigues  Date: 2017  Visit #:    Referral Diagnosis: Left-sided weakness, Abnormality of Gait  Referring provider: Isabel Barrios MD  Visit Diagnosis:     ICD-10-CM    1. Generalized muscle weakness M62.81    2. Ataxic Gait R26.9    3. 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 History of CVA (cerebrovascular accident) Z86.73    5. Benign Essential Hypertension I10    6. Chronic Kidney Disease, Stage 3 N18.3    7. Osteopenia M89.9     M94.9          Assessment:   Pt improving with gait speed and lower extremity strength overall per 6 minute walk test and sit to stand test.  She will miss her therapy session next week on  due to endoscopy on her throat- procedure sounds similar to a stricture correction. Pt has seen SLP for dysphagia, and had an Esophogram for dyspepsia.   all in 2017  Patient demonstrates understanding/independence with home program.  Patient is benefitting from skilled physical therapy and is making steady progress toward functional goals.    Goal Status:  Pt. will be able to walk : 20 minutes;for community mobility;in 12 weeks;Comment  Comment:: with good gait speed and no need to touch walls  Patient will transfer: for in/out of chair;in 12 weeks;Comment  Comment: without use of hands 8 reps      Good improvement towards goal with increased gait speed. We are working on distance and patient able to walk 6 minutes today with hamstring area fatigue.  Sit to stand from 17 inches is still difficult and only one rep, but from 20 inches is improving.    Plan / Patient Education:     Continue with initial plan of care.  Progress with home program as tolerated.    Subjective:     Pain Ratin  It is getting easier to get off the toilet, I am working on walking more in the house, and I am working on taking bigger steps. I have one hallway that I can practice this in.      Objective:     6 minute Walk  "Test: 410 feet with no device, with hands in high guard position   test: right 38#, left 25# (nl 50#)  Sit to stand test: from 17 inches is still 1 rep, but from 20 inch seat height now able to do 11 reps in 30 seconds (goal is 13 or more)  Gait observation: Gait speed is much improved, and step length is longer bilat. but slightly asymmetrical. Pt carrying cane in right hand, but occasionally touches hand to wall. Often walks in \"high guard\" position.She has much reduced trunk roation, and when fatigued left knee is extended during all gait cycles.    Treatment Today     TREATMENT MINUTES COMMENTS   Evaluation     Self-care/ Home management     Manual therapy 15 Supine MT/SCS to Bilat anterior neck with veinous fascial bias to assist sinus drainage and neck mobility during gait   Neuromuscular Re-education     Therapeutic Activity     Therapeutic Exercises 45 Gait activity and balance activities, exercises per flow sheet   Gait training     Modality__________________                Total 60    Blank areas are intentional and mean the treatment did not include these items.       Fior Lundberg  5/17/2017    "

## 2021-06-10 NOTE — PROGRESS NOTES
Pre-Procedure Unconfirmed COVID Test     Bing Rodrigues    COVID Screening  Due to the inability to confirm the patient's COVID status (positive or negative), the patient was screened for COVID symptoms     Patient reports the following:  Fever? No   Cough? No   Shortness of breath? No   Skin rash? No    If the patient is positive for new symptoms or worsening symptoms, and the procedure is deemed necessary by the ordering provider, notify your manager/supervisor     Patient Information  Patient spouse informed  to continue to self-quarantine prior to procedure  Patient spouse informed to contact the ordering provider if the following symptoms develop prior to procedure:   Fever  Cough  Shortness of Breath  Sore throat   Runny or stuffy nose  Muscle or body aches  Headaches  Fatigue  Vomiting or diarrhea   Rash    Nely Muñoz

## 2021-06-10 NOTE — PROGRESS NOTES
[facility] Daily Progress     Patient Name: Bing Rodrigues  Date: 2017  Visit #:   Referral Diagnosis: Left-sided weakness, Abnormality of Gait  Referring provider: Isabel Barrios MD  Visit Diagnosis:     ICD-10-CM    1. Generalized muscle weakness M62.81    2. History of CVA (cerebrovascular accident) Z86.73    3. Benign Essential Hypertension I10    4. Chronic Kidney Disease, Stage 3 N18.3    5. Ataxic Gait R26.9    6. 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 Osteopenia M89.9     M94.9          Assessment:     Pt having an episode of weakness which seems to be affecting the left side extremities this past week resulting in decline in safety and mobility. Pt reports UE strength is poor and she often cannot open her pill containers.    Goal Status:  Pt. will be able to walk : 20 minutes;for community mobility;in 12 weeks;Comment  Comment:: with good gait speed and no need to touch walls  Patient will transfer: for in/out of chair;in 12 weeks;Comment  Comment: without use of hands 8 reps     Pt is able to walk 2 minutes at home, and seldom walks further, is walking with slightly improved gait speed but speed does decline  Transfer sit to stand without use of arms from standard height seat- still not met    Plan / Patient Education:     Continue with initial plan of care.  Progress with home program as tolerated.    Subjective:     Pain Ratin  I fell yesterday on the step to get into the house- no rail, but the I think the left leg gave out. I fell onto my buttock, and my  helped me up- no injury.  Also one morning I couldn't get my arms to assist me to sit up- mainly my left arm. My  had to help me.       Objective:      test: right 38#, Left 25# (nl is 50#)  Sit to stand: from 17 inch seat height is 1 rep, from 20  Inch seat height she is able to do 7 reps in 30 seconds.  SL stand: right 1 second, left 0 second  6 minute walk test: 250 feet without cane. Pt very  fatigued after this test.    Gait description: very reduced arm swing bilat, stiff legged both legs, inconsistent step length and cari but generally slows down after 30 feet.    Treatment Today     TREATMENT MINUTES COMMENTS   Evaluation     Self-care/ Home management 15 Discussed likelihood of underlying infection such as UTI or cold being an underlying source of lack of energy and strength, and possibly seeing MD if this occurs again.   Manual therapy     Neuromuscular Re-education     Therapeutic Activity     Therapeutic Exercises 40 Added walking 5-6 minutes per day inside the saftey of her home, performed exercises per flow sheet   Gait training     Modality__________________                Total 60    Blank areas are intentional and mean the treatment did not include these items.       Fior Lundberg  5/8/2017

## 2021-06-10 NOTE — TELEPHONE ENCOUNTER
I will send Nutrisource as a rx to avoid switching. Can you follow up with the pharmacy to see if they have in stock or able to order? Thanks!

## 2021-06-10 NOTE — PROGRESS NOTES
ADDENDUM:    Return call from [facility].  Mona coming to meet with spouse Ramon tomorrow 8/7 for assessment.  [facility] has home health aid who will do tube feeding and personal care (bathing) for $39/hr with 4 hour min.  [facility] has RN who can do med set up at $160/hr billed in 15 min increments.  Graciela confirmed that pt and spouse have funds in [place] that would need to be used for private pay services.  Spouse will update CC team on outcome of assessment.     Clinic Care Coordination Contact    Follow Up Progress Note      Assessment: CC SW called and spoke to spouse Ramon about private pay RN that would assist long term with medications and tube feedings.  Ramon called [employer] and they will not do tube feedings.  CC SW was in process of recommending spouse contact [facility] when [facility] SW [facility]ac[facility] Carlos Alberto arrived for a visit.     CC SW and Graciela spoke to avoid duplication of services.  CC SW provided background info on pts situation and transition of care back to community and community based PCP. Informed Graciela pt is over income for [facility] [employer].  Graciela will assist spouse in calling agencies for private pay services and will update CC SW.     Goals addressed this encounter:   Goals Addressed                 This Visit's Progress       Patient Stated      Medication 1 (pt-stated)   50%     Goal Statement: I would like to obtain Tube Feeding bags and Tube Feeding cans from a new DME company covered by my insurance in the next 30 days  Date Goal set: 7/28/20  Barriers: lack of resources, coordination of care, complex medically  Strengths: motivated to learn  Date to Achieve By: 30 days  Patient expressed understanding of goal: spouse stated an understanding  Action steps to achieve this goal:  1. The CCC RN sent a note to Dr. Wheeler to notify her of my need for the above items.  2. Prescriptions for the above items will be written and faxed to [employer].  3. [employer] will contact me to determine a delivery date.         Medication 1 (pt-stated)   90%     Goal Statement: I would like to transfer my medications from [employer] to [facility] mail to home pharmacy in the next 2-4 weeks.  Date Goal set: 7/28/20  Barriers: coordination of care, medically complex  Strengths: motivated  Date to Achieve By: ongoing  Patient expressed understanding of goal: discussed with spouse  Action steps to achieve this goal:  1. Spouse spoke with [employer] mail to home pharmacy yesterday to start the process.  2. Bing will not have enough medication during the 7-10 day process.  3. CCC RN sent a note to PCP for 7 days of medications for amlodipine and citalopram.   4.  CCC RN will continue to call patient/spouse to check on status of medication inventory.  5. CCC RN will call home care nurse to check on status of spouse knowledge of medications.        Other (pt-stated)   50%     Goal Statement: I would like information to identify community resources for private pay RN care (long term)    Date Goal set: 7/28/20  Barriers: medically complex, lack of resources, coordination of care  Strengths: motivated  Date to Achieve By: ongoing  Patient expressed understanding of goal: discussed with spouse    Action steps to achieve this goal:  1. My  will talk to the Home Care SW Graciela who will help me call resources    2.  Home care SW will update CC SW    3.  My  will update CC team on outreach calls    Updated: 8/6/20             Intervention/Education provided during outreach: [employer] private pay RN          Plan:     CHW and RN continue outreach; CC SW will share update from home health SW with team

## 2021-06-10 NOTE — TELEPHONE ENCOUNTER
Additional questions received, answered and [facility].

## 2021-06-10 NOTE — TELEPHONE ENCOUNTER
I FILLED IN DIRECTIONS AND QUANTITIES THE BEST I COULD.   SET TO PRINT TO FAX TO [facility]

## 2021-06-10 NOTE — PROGRESS NOTES
"[facility] Daily Progress     Patient Name: Bing Rodrigues  Date: 2017  Visit #:   Referral Diagnosis: Left-sided weakness, Abnormality of Gait  Referring provider: Isabel Barrios MD  Visit Diagnosis:     ICD-10-CM    1. Generalized muscle weakness M62.81    2. History of CVA (cerebrovascular accident) Z86.73    3. Benign Essential Hypertension I10    4. Chronic Kidney Disease, Stage 3 N18.3    5. Ataxic Gait R26.9    6. 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 Osteopenia M89.9     M94.9          Assessment:     Response to Intervention fair  Patient is appropriate to continue with skilled physical therapy intervention, as indicated by initial plan of care.    Goal Status:  Pt. will be able to walk : 20 minutes;for community mobility;in 12 weeks;Comment  Comment:: with good gait speed and no need to touch walls  Patient will transfer: for in/out of chair;in 12 weeks;Comment  Comment: without use of hands 8 reps   No Data Recorded    Plan / Patient Education:     Continue with initial plan of care.  Progress with home program as tolerated.    Subjective:     Pain Ratin  Has swallow test at [facility] yesterday. Had appt at [place] [facility] on Friday for endoscopy. Has to schedule follow up for results. She reports she has choking issues with eating at home.  She has been doing her exercises 1x/day. She states they're easy and \"they don't do a thing\".   She states she \"just plops down\" on the toilet and needs hand support to stand up.  She dresses in the bathroom because of her  works irregular hours. She denies any significant difficulty with dressing. She uses the bath tub and uses the spout to pull herself up.  She does this unassisted. She used to have a bath bench, but it's gone now.     Objective:     rhomberg 20-30\" with mod sway.   Able to rise to standing from mat in gym at 21-22 inches, but substitutes by pushing back of leg against the mat, WB on heels only.   Does not use " cane consistently with ambulation, sometimes just carrying it in the clinic.     Treatment Today     TREATMENT MINUTES COMMENTS   Evaluation     Self-care/ Home management     Manual therapy     Neuromuscular Re-education     Therapeutic Activity     Therapeutic Exercises 45 She reports doing about 10 reps of each exercise.  Discussed increasing repetitions at home.   Longer discussion re: functional status at home.  Exercises per flow sheet.   Issued handouts.    Gait training     Modality__________________                Total 45    Blank areas are intentional and mean the treatment did not include these items.       Raquel Rodriguez  4/11/2017

## 2021-06-10 NOTE — TELEPHONE ENCOUNTER
Medication Request  Medication name: omeprazole (PRILOSEC) 2 mg/mL SusR suspension  Requested Pharmacy: [facility]  Reason for request: Patient's  stated the medication was previously prescribed by a different provider, but he would like patient's primary care provider to take over refilling medication.  When did you use medication last?: Today 08/12/2020  Patient offered appointment:  patient declined  Okay to leave a detailed message: yes

## 2021-06-11 NOTE — PROGRESS NOTES
Assessment/Plan:     1. Benign Essential Hypertension  Blood pressure well controlled.  Provided refill.  Also recommended rechecking a renal panel as there was a slight decrease with last labs.  Patient refusing but states that she will plan to get it checked when she comes back in 3 months for her annual physical.  - lisinopril (PRINIVIL,ZESTRIL) 40 MG tablet; Take 1 tablet (40 mg total) by mouth daily.  Dispense: 90 tablet; Refill: 3  - amLODIPine (NORVASC) 10 MG tablet; TAKE ONE TABLET BY MOUTH ONE TIME DAILY  Dispense: 90 tablet; Refill: 3      Subjective:      Bing Rodrigues is a 67 y.o. female is in today for blood pressure check.  Is my first time meeting with her so briefly reviewed past medical history as well as last note with primary care provider at this office.  Also reviewed last labs she had mild decrease in glomerular filtration rate at last lab check.  She states she feels well has been taking medications regularly.  Requests refill.  She does not check her blood pressures at home.  She has history of CVA and falls with physical therapy.  Briefly reviewed last mammogram up-to-date.  States that she plans to come back for annual physical in the fall.  No other new concerns today.    Current Outpatient Prescriptions   Medication Sig Dispense Refill     amLODIPine (NORVASC) 10 MG tablet TAKE ONE TABLET BY MOUTH ONE TIME DAILY 90 tablet 3     aspirin 81 MG EC tablet Take 81 mg by mouth daily.       cholecalciferol, vitamin D3, 1,000 unit tablet Take 1,000 Units by mouth daily.       lisinopril (PRINIVIL,ZESTRIL) 40 MG tablet Take 1 tablet (40 mg total) by mouth daily. 90 tablet 3     omeprazole (PRILOSEC) 20 MG capsule Take 1 capsule by mouth 2 (two) times a day.       vitamin E 400 UNIT capsule Take 400 Units by mouth daily.       No current facility-administered medications for this visit.        Past Medical History, Family History, and Social History reviewed.  Past Medical History:   Diagnosis  Date     Hypertension      Past Surgical History:   Procedure Laterality Date     HI DILATION/CURETTAGE,DIAGNOSTIC      Description: Dilation And Curettage;  Recorded: 02/07/2013;     Review of patient's allergies indicates no known allergies.  Family History   Problem Relation Age of Onset     Stroke Maternal Grandmother      Stroke Maternal Grandfather      Early death Paternal Grandmother      COPD Paternal Grandfather      Cancer Father      from smoking     Social History     Social History     Marital status:      Spouse name: N/A     Number of children: N/A     Years of education: N/A     Occupational History     Not on file.     Social History Main Topics     Smoking status: Never Smoker     Smokeless tobacco: Not on file     Alcohol use No     Drug use: Not on file     Sexual activity: No     Other Topics Concern     Not on file     Social History Narrative         Review of systems is as stated in HPI, and the remainder of the 10 system review is otherwise negative.    Objective:     Vitals:    06/07/17 0951   BP: 122/76   Patient Site: Left Arm   Patient Position: Sitting   Cuff Size: Adult Regular   Pulse: 60   Weight: 125 lb 6.4 oz (56.9 kg)    Body mass index is 24.49 kg/(m^2).    General Appearance:    Alert, cooperative, no distress, appears stated age   Head:    Normocephalic, without obvious abnormality, atraumatic   Eyes:    PERRL   Ears:    Normal external ear canals   Nose:   Mucosa normal, no drainage       Throat:   Oropharynx is clear   Neck:   Supple, symmetrical, no adenopathy, no thyromegally, no carotid bruit        Lungs:     Clear to auscultation bilaterally, respirations unlabored   Chest Wall:    No tenderness or deformity    Heart:    Regular rate and rhythm, S1 and S2 normal, no murmur, rub    or gallop                   Extremities:  using cane for ambulation, some chronic extremity weakness, no cyanosis or edema   Pulses:   2+ and symmetric all extremities   Skin:   No  rashes or lesions         This note has been dictated using voice recognition software. Any grammatical or context distortions are unintentional and inherent to the the software.

## 2021-06-11 NOTE — PROGRESS NOTES
[facility] Daily Progress     Patient Name: Bing Rodrigues  Date: 2017  Visit #: 10/12  Referral Diagnosis: Left-sided weakness, Abnormality of Gait  Referring provider: Isabel Barrios MD  Visit Diagnosis:     ICD-10-CM    1. Generalized muscle weakness M62.81    2. Ataxic Gait R26.9    3. 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 History of CVA (cerebrovascular accident) Z86.73    5. Benign Essential Hypertension I10    6. Chronic Kidney Disease, Stage 3 N18.3    7. Osteopenia M89.9     M94.9          Assessment:     Patient reports she has one flight of steps to do inside her house on a daily basis, so she feels comfortable with stair climbing. She notes transferring to standing is difficult and we did work on this today.  She also reports that she fell when walking too fast years ago in a mall, and this may be an underlying factor when being asked to walk faster during the 6 minute walk test. Today she admits that her legs were tired during the test as we had already done 15 minutes of sit to stand activities.  Patient is overall walking more erect, slighter faster pace, and much less reaching out for the wall (she carries her SEC).    Goal Status:  Pt. will be able to walk : 20 minutes;for community mobility;in 12 weeks;Comment  Comment:: with good gait speed and no need to touch walls  Patient will transfer: for in/out of chair;in 12 weeks;Comment  Comment: without use of hands 8 reps      Spent much time talking about sit to stand technique which made an immediate effect on both ease of transfers and balance (she did not lose balance and lean back against the chair)  Pt reports she has not been able to get as much done around her home as she wants, due to the difficulty in rising from chairs.        Plan / Patient Education:     Continue to work on 6 MWT and sit to stand, Left leg standing activities, and balance    Subjective:     Pain Ratin  I was able to walk 100 ft without my  cane.  I am seeing a new doctor to talk about renewing my meds- Dr. Barrios is retired.  I had my throat enlarged last week- I had it done once before. I feel no change after the procedure, and I still feel choking when I eat solid food.      Objective:     Sit to Stand Test: 3 reps from 17 inch seat height , stopped test to  patient on technique as she was abruptly extending knees and losing balance backwards. She needed coaching to keep feet under her body, and bend forward at waist more.    6 Minute Walk test: 380 feet, without device, but patient fatigued from previous exercises- she felt legs were tired. Arms in high guard position, but she is making more attempt to lift legs higher- less foot shuffle.  Resting HR 53, working HR 58 bpm. SpO2 unchanged with rest or exercise 98%.    Corner spins- 5 reps both directions - balance/vestibular- mild dysequilibrium.  Stair climb I[ and down 20 steps- foot over foot using handrail- no cane. Good balance and technique.      Treatment Today     TREATMENT MINUTES COMMENTS   Evaluation     Self-care/ Home management     Manual therapy     Neuromuscular Re-education     Therapeutic Activity     Therapeutic Exercises 30 Sit to stand test, and then discussion of transfer techniue to improve ease, safety, and balance   Gait training 30 Walking drills and tests, stair climbing, balance activities like corner spins   Modality__________________                Total 60    Blank areas are intentional and mean the treatment did not include these items.       Fior Lundberg  6/2/2017

## 2021-06-11 NOTE — PROGRESS NOTES
Clinic Care Coordination Contact    Community Health Worker Follow Up    Goals:   Goals Addressed                 This Visit's Progress       Patient Stated      Medication 1 (pt-stated)   90%     Goal Statement: I would like to transfer my medications from [facility] to [facility] mail to home pharmacy in the next 2-4 weeks.  Date Goal set: 7/28/20  Barriers: coordination of care, medically complex  Strengths: motivated  Date to Achieve By: ongoing  Patient expressed understanding of goal: discussed with spouse    Action steps to achieve this goal:  1. Spouse spoke with [employer] mail [facility] home pharmacy yesterday to start the process.(completed)  2. I will have my  answer the phone when the Kathy RN calls to check on the status of medication inventory.   3. [facility] RN will call home care nurse to check on status of spouse knowledge of medications.  4.  I will call [employer] once we need additional refills.   5. I will update CHW when our mailbox is fixed, as they have not been able to receive mail for a 8 days.    Discussed and updated 10/1/2020  Updated: 8/19/2020        Medication 1 (pt-stated)   20%     Goal Statement: I would like to speak with the pharmacist at the clinic to better understand Jennies medication in the next 30 days.  Date Goal set: 8/19/20  Barriers: cognition,complex medication regimen  Strengths: motivated  Date to Achieve By: 30 days  Patient expressed understanding of goal: yes  Action steps to achieve this goal:  1. I will speak with the MTM to understand Jennies medications.  2. I might need a prior authorization for her Prilosec due to the cost of $190/month for the elixir.  The [facility] RN will send a note to the PCP and MTM.  3. I will continue to use the capsule of omeprazole and open the capsules, flush with warm water (not hot) after use as to not clog Bing's feeding tube.   4. CHW will follow up with patient at next outreach and schedule MTM appointment if desired    Discussed and Updated  10/1/2020- Patient's spouse states everything is taken care of.            CHW Next Follow-up: 10/27/2020    CHW Plan: To follow up and discuss/update goal progression with Bing with monthly outreach calls.    Patient was not home at time of CHW call today, CHW spoke with patient's spouse Oscar. Briefly discussed patient CCC goals around transferring medications from [employer] to mail order with [employer] Spouse states mailbox is broken and has been for 8 days not sure the status of this goal.  Discussed affording medication goal with spouse he believes this is all taken care of- CHW completed chart review and has seen MTM involvement- CHW will offer MTM appt if desired at next outreach.

## 2021-06-11 NOTE — PROGRESS NOTES
[facility] Daily Progress/Monthly Summary     Patient Name: Bing Rodrigues  Date: 2017  Visit #:   Referral Diagnosis: Left sided weakness, Abnormality of Gait  Referring provider: Isabel Barrios MD  Visit Diagnosis:     ICD-10-CM    1. Generalized muscle weakness M62.81    2. Ataxic Gait R26.9    3. 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 History of CVA (cerebrovascular accident) Z86.73    5. Benign Essential Hypertension I10    6. Chronic Kidney Disease, Stage 3 N18.3    7. Osteopenia M89.9     M94.9          Assessment:   Patient fell  and was hospitalized 2 nights at [facility], and CT scan of head and neck normal, small area of infiltrate in left lower lung. Patient has completed a course of antibiotics.  All parameters of strength and endurance measured today have declined since 17 note and this is likely due to deconditioning and recent inactivity.  Patient admits she hasn't been doing the exercises as much at home since her fall.  Patient demonstrates understanding/independence with home program.  Patient is appropriate to continue with skilled physical therapy intervention, as indicated by initial plan of care.    Goal Status:  Pt. will be able to walk : 20 minutes;for community mobility;for household mobility;in 12 weeks;Comment  Comment:: with good gait speed and no need to touch walls  Patient will transfer: for in/out of chair;in 12 weeks;Comment  Comment: without use of hands 8 reps    Transferring out of chair still requires hands, and patient actually still falls into chair- this is worse since hospitalization.  Patient is able to walk 6 minutes but is beginning to drag her left leg and become fatigued enough to compromise her balance    Plan / Patient Education:   Focus on LE exercises and balance , as well as transfer technique  Continue with initial plan of care.  Progress with home program as tolerated.    Subjective:     Pain Ratin  I feel like I  took a step backwards in my walking.  I haven't fallen in the past 2 weeks.    I am done with the antibiotics for pneumonia.      Objective:     Sit to stand: from 16-17 inches seat height is 0 reps in 30 sec  Sit to stand from 20 in. seat height: 5 reps in 30 seconds with loss of balance backwards and many reps not counted due to inability to completely straighten up before losing balance.  6 Minute Walk Test: no device, 211.5 feet, last 100 feet patient did shuffle and drag left foot, patient did lose balance when on the straight (to the right) and also when she abruptly turned her head to the right to view people.  Single leg Heel raises: Left 0 reps, right 7 reps- nl 20 reps    Treatment Today     TREATMENT MINUTES COMMENTS   Evaluation     Self-care/ Home management     Manual therapy     Neuromuscular Re-education     Therapeutic Activity     Therapeutic Exercises 55 See flow sheet- did LE exercises and 6 minute walk test   Gait training     Modality__________________                Total 60    Blank areas are intentional and mean the treatment did not include these items.       Fior Lundberg  7/17/2017

## 2021-06-11 NOTE — TELEPHONE ENCOUNTER
Who is calling:  Patient's , Oscar  Reason for Call:    Patient's  picked up famotidine today and will be giving to patient starting today, 9/2/2020.  Date of last appointment with primary care: 9/1/2020  Okay to leave a detailed message: No return call needed.

## 2021-06-11 NOTE — TELEPHONE ENCOUNTER
calling  New med Thursday Pepcid   Gave wrong dose on fri/sat/sun  Gave pt pt 20 ml 1 time fri 2 times on sat & 1 time sun before he realized mistake   denies any N/Vabdominal pain/fast HR or lightheadedness  Contact to poison control,spoke with Quinten who stated that since the mistaken dose was corrected on 9/6 & none of the above symptoms are present that pt needs no further intervention.  Pharmacy dispensed 50 ml of the 150ml ordered & pt will need refill. Directed pt to pharmacy & PCP will be notified  Routing to PCP as FYI.    COVID 19 Nurse Triage Plan/Patient Instructions    Please be aware that novel coronavirus (COVID-19) may be circulating in the community. If you develop symptoms such as fever, cough, or SOB or if you have concerns about the presence of another infection including coronavirus (COVID-19), please contact your health care provider or visit [email or web address].     Disposition/Instructions    Home care recommended. Follow home care protocol based instructions.    Thank you for taking steps to prevent the spread of this virus.  o Limit your contact with others.  o Wear a simple mask to cover your cough.  o Wash your hands well and often.    Resources    [facility]: About COVID-19: [email or web address]    CDC: What to Do If You're Sick: www.cdc.gov/coronavirus/2019-ncov/about/steps-when-sick.html    CDC: Ending Home Isolation: www.cdc.gov/coronavirus/2019-ncov/hcp/disposition-in-home-patients.html     CDC: Caring for Someone: www.cdc.gov/coronavirus/2019-ncov/if-you-are-sick/care-for-someone.html     [facility]: Interim Guidance for Hospital Discharge to Home: www.health.state.[place]/diseases/coronavirus/hcp/hospdischarge.pdf    [facility] clinical trials (COVID-19 research studies): [email or web address]     Below are the COVID-19 hotlines at the [place] Department of Health ([facility]). Interpreters are available.   o For health questions: Call  [phone] or [phone] (7 a.m. to 7 p.m.)  o For questions about schools and childcare: Call 033-105-5281 or 1-204.360.9716 (7 a.m. to 7 p.m.)     Jennifer Castaneda RN  [facility] Nurse Advisor        Reason for Disposition    DOUBLE DOSE (an extra dose or lesser amount) of prescription drug    Additional Information    Negative: Mercury spill (e.g., broken glass thermometer, broken spiral CFL lightbulb)    Negative: Carbon monoxide exposure suspected    Negative: PETROLEUM PRODUCT ingestion (e.g., kerosene, gasoline, benzene, furniture polish, lighter fluid) AND NO symptoms    Negative: HARMFUL SUBSTANCE or ACID or ALKALI ingestion (e.g., toilet , drain , lye, Clinitest tablets, ammonia, bleaches) AND NO symptoms    Negative: HARMFUL SUBSTANCE or ACID or ALKALI ingestion (e.g., toilet , drain , lye, Clinitest tablets, ammonia, bleaches) AND any symptoms (e.g., mouth pain, sore throat, breathing difficulty)    Negative: PETROLEUM PRODUCT ingestion (e.g., kerosene, gasoline, benzene, furniture polish, lighter fluid) AND any symptoms (e.g., breathing difficulty, coughing, vomiting)    Negative: Poison Center advised caller to go to ED and caller seeking second opinion    Negative: Patient sounds very sick or weak to the triager    Negative: Chemical in eye    Negative: SEVERE difficulty breathing (e.g., struggling for each breath, speaks in single words)    Negative: Bluish (or gray) lips or face    Negative: Seizure    Negative: Difficult to awaken or acting confused (e.g., disoriented, slurred speech)    Negative: Shock suspected (e.g., cold/pale/clammy skin, too weak to stand, low BP, rapid pulse)    Negative: Intentional overdose and suicidal thoughts or ideas    Negative: Suicide attempt, known or suspected    Negative: Sounds like a life-threatening emergency to the triager    Protocols used: POISONING-A-[place]

## 2021-06-11 NOTE — PROGRESS NOTES
[facility] Daily Progress /Monthly Summary    Patient Name: Bing Rodrigues  Date: 2018  Visit #:   Referral Diagnosis: Left Sided Weakness, Abnormality of Gait  Referring provider: Isabel Barrios MD  Visit Diagnosis:     ICD-10-CM    1. Generalized muscle weakness M62.81    2. Ataxic Gait R26.9    3. 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 History of CVA (cerebrovascular accident) Z86.73    5. Benign Essential Hypertension I10    6. Chronic Kidney Disease, Stage 3 N18.3    7. Osteopenia M89.9     M94.9          Assessment:   Patient does show steady improvement in sit to stand and gait speed, but general trunkal muscualr tone makes motions slightly ataxic and rigid.  Patient demonstrates understanding/independence with home program.  Patient is benefitting from skilled physical therapy and is making steady progress toward functional goals.    Goal Status:  Pt. will be able to walk : 20 minutes;for community mobility;for household mobility;in 12 weeks;Comment  Comment:: with good gait speed and no need to touch walls  Patient will transfer: for in/out of chair;in 12 weeks;Comment  Comment: without use of hands 8 reps    At times she can use only one arm sit to stand (previously had to use 2 arms)  Patient often walks and stands to wait 20 minutes for the bus, so this aspect is getting better. Gait speed overall is improved by still variable.      Plan / Patient Education:     Continue with initial plan of care.   Try deep squat and floor to stand activity, also kneel standing.  Assess hip tightness    Subjective:     Pain Ratin        Objective:     Lower Extremity Functional Scale (_/80): 30 (was 32% 2 months ago)   6 MWT: 320 feet without cane, slight dragging of left leg about 50% of time and no touching walls. Previous best time was 400 feet.  Sit to stand: 1 rep from 17 inch seat height. Second attempt 4 reps- patient started laughing or I think she would have done better  SL  stand: left 0 seconds, right 1 second- no hand support  Patient still has difficulty fully weightshifting onto the left leg, clearing the left foot completely during swing through, and also has increased tone in the trunk and left knee causing decreased trunk rotation and stiff left knee.      Treatment Today     TREATMENT MINUTES COMMENTS   Evaluation     Self-care/ Home management     Manual therapy     Neuromuscular Re-education     Therapeutic Activity     Therapeutic Exercises 25 See flow sheet   Gait training 30 6 minute walk test, one leg balance actvitties with support and either compression through hips or shoulders   Modality__________________                Total 60    Blank areas are intentional and mean the treatment did not include these items.       Fior Lundberg  6/16/2017

## 2021-06-11 NOTE — PROGRESS NOTES
Assessment/Plan:     Patient presents to clinic for follow-up.  She believes everything is going well.  Her main complaint is acid reflux which causes her to sometimes cough and then sometimes vomit.  Patient did well when she was on liquid suspension omeprazole but it has been impossible to obtain secondary to cost since she has been discharged.  I will redirect this back to medication management pharmacy to see if there is anything else that can be done.     and I decided to trial opening the omeprazole capsule into a glass containing 20 mL of water for an hour in order to help facilitate dissolution.  He will then suck this up into a syringe and administer it via the G-tube.  If this is ineffective, we will need to make alternative plans.    Problem List Items Addressed This Visit     Gastro-esophageal reflux disease with esophagitis    Gastrostomy present (H)      Other Visit Diagnoses     Encounter for immunization    -  Primary    Relevant Orders    Influenza,Quad,High Dose,PF 65 YR+          Return to clinic 3 months.    Subjective:      Bing Rodrigues is a 70 y.o. female who presents to clinic for follow-up.    Patient endorses having no concerns.  She is challenging to understand, as is her .  The main concern seems to be acid reflux which sometimes causes a cough and vomiting.  Patient did well when she had the liquid suspension when she was in the facility.  Patient is adamant about not returning to facility.  She does not have acid reflux symptoms every day but is scheduled to take omeprazole every day.  They have been unable to obtain a liquid suspension secondary to cost.  Patient's  has been breaking open the capsule and attempting to injected into her G-tube but has not been able to do this successfully as he believes the solute never gets where it needs to be.    She has no further falls.    Past Medical History, Family History, and Social History reviewed.     Current  "Outpatient Medications on File Prior to Visit   Medication Sig Dispense Refill     acetaminophen (TYLENOL) 500 mg/15.62 mL solution 19.99 mL (640 mg total) by G-tube route every 4 (four) hours as needed for fever or pain.  0     amLODIPine (NORVASC) 5 MG tablet 1 tablet (5 mg total) by G-tube route daily. 90 tablet 3     aspirin 81 mg chewable tablet 1 tablet (81 mg total) by G-tube route daily.  0     calcium, as carbonate, (TUMS) 200 mg calcium (500 mg) chewable tablet 2 tablets (400 mg total) by G-tube route 4 (four) times a day as needed for heartburn.  0     cholecalciferol, vitamin D3, 1,000 unit tablet 1 tablet (1,000 Units total) by G-tube route daily.  0     citalopram (CELEXA) 10 MG tablet 1 tablet (10 mg total) by G-tube route daily. 90 tablet 3     feeding container and pump set Misc Use 30 Bags As Directed every 30 (thirty) days. [facility] Enteral Feeding Pump Set 1\" 1000 ml 30 bags per month 1 each 0     ferrous sulfate 325 (65 FE) MG tablet 1 tablet       guar gum (BENEFIBER;NUTRISOURCE) Pack powder packet 2 packets by Enteral Tube route 2 (two) times a day.  0     guar gum (NUTRISOURCE FIBER) Powd DISSOLVE 4 TEASPOONSFUL IN WATER AND GIVE PER G-TUBE TWICE DAILY 410 g 11     loperamide (IMODIUM) 1 mg/5 mL solution 10 mL (2 mg total) by G-tube route daily as needed for diarrhea.  0     melatonin 3 mg Tab tablet 1 tablet (3 mg total) by Enteral Tube route at bedtime as needed.  0     nut.tx.impaired digest fxn (PEPTAMEN 1.5) 0.068 gram- 1.5 kcal/mL Liqd Take 1 Bottle by mouth 4 (four) times a day. Peptamen 1.5 @@ 40 ml/hr for 22 hours then off for 2 hours.  Total of 520 CC over 24 hours 120 Bottle 30     nystatin (MYCOSTATIN) powder Apply to affected area 3 times daily 15 g 0     omeprazole (PRILOSEC) 2 mg/mL SusR suspension 10 mL (20 mg total) by Enteral Tube route daily before breakfast. 300 mL 3     omeprazole (PRILOSEC) 20 MG capsule Open 1 capsule (20 mg total) and mix with 20 mL of water, " administer via G-Tube, then flush with an additional 10 mL of water 90 capsule 0     syringe, ENFit, non-sterile (MONOJECT ENFIT SYRINGE) 60 mL Syrg Use 1 Syringe As Directed as needed. 30 CC water bolus after before and after feedings 30 Syringe 11     syringe, ENFit, sterile (MONOJECT ENFIT SYRINGE) 12 mL Syrg Use 1 Syringe As Directed as needed. Monoject enteral syringe female 30 Syringe 11     No current facility-administered medications on file prior to visit.        Review of systems is as stated in HPI.  The remainder of the 10 system review is otherwise negative.    Objective:     /80   Pulse 74   Temp 97.1  F (36.2  C)   Wt 134 lb (60.8 kg)   SpO2 96%   BMI 26.17 kg/m   Body mass index is 26.17 kg/m .    Gen: Alert, NAD, appears stated age, normal hygiene   Neuro: Definitely challenging to understand, particularly with the mask in place, status post stroke  Psych: no apparent hallucinations or delusions, no pressured speech; alert, oriented x3, very poor eye contact, appears depressed but denies symptoms      This note has been dictated using voice recognition software. Any grammatical or context distortions are unintentional and inherent to the the software.     Binta Wheeler MD

## 2021-06-11 NOTE — PROGRESS NOTES
[facility] Daily Progress     Patient Name: Bing Rodrigues  Date: 2017  Visit #:   Referral Diagnosis:  Left-sided weakness, Abnormality of Gait  Referring provider: Sudha Gill CNP  Visit Diagnosis:     ICD-10-CM    1. Generalized muscle weakness M62.81    2. Ataxic Gait R26.9    3. 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 History of CVA (cerebrovascular accident) Z86.73    5. Benign Essential Hypertension I10    6. Chronic Kidney Disease, Stage 3 N18.3    7. Osteopenia M89.9     M94.9          Assessment:   Pt is observed to be walking quickly and more erect, and this is corroborated by increased 6 MWT. Seldom reaches put to touvh walls anymore, except at end pof hour long session when she is tired.  Patient demonstrates understanding/independence with home program.  Patient is benefitting from skilled physical therapy and is making steady progress toward functional goals.    Goal Status:  Pt. will be able to walk : 20 minutes;for community mobility;in 12 weeks;Comment  Comment:: with good gait speed and no need to touch walls  Patient will transfer: for in/out of chair;in 12 weeks;Comment  Comment: without use of hands 8 reps         Plan / Patient Education:   Practice more power exercises ( increased speed sit to stand and side step or lunge)  Continue with initial plan of care.  Progress with home program as tolerated.    Subjective:     Pain Ratin  I am finding it easier to get out of chair      Objective:     Resting HR 60, After 74 bpm,  Sp02 stayed at 98-99%  6 MWT- 400 feet, no device, improving arm swing and erect posture   Added more luiz shift in all directions and trunk reach and rotation, both seated and standing      Treatment Today     TREATMENT MINUTES COMMENTS   Evaluation     Self-care/ Home management     Manual therapy     Neuromuscular Re-education     Therapeutic Activity     Therapeutic Exercises 45 See flow sheet   Gait training 15 6 MWT, and then multiple  walks with in clinic while marching and doing slide steps around plinth   Modality__________________                Total 60    Blank areas are intentional and mean the treatment did not include these items.       Fior Lundberg  6/7/2017

## 2021-06-11 NOTE — TELEPHONE ENCOUNTER
----- Message from Binta Wheeler MD sent at 9/1/2020  1:10 PM CDT -----  Regarding: omeprazole  Is there anything that we can do to get a liquid suspension of ANY GERD medication? The  is really struggling to get the capsule contents into the g-tube...

## 2021-06-11 NOTE — PROGRESS NOTES
[facility] Daily Progress     Patient Name: Bing Rodrigues  Date: 7/10/2017  Visit #: 3/12 + 12  Referral Diagnosis:Left Sided Weakness, Abnormality of Gait  Referring provider: Isabel Barrios MD  Visit Diagnosis:     ICD-10-CM    1. Generalized muscle weakness M62.81    2. Ataxic Gait R26.9    3. 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 History of CVA (cerebrovascular accident) Z86.73    5. Benign Essential Hypertension I10    6. Chronic Kidney Disease, Stage 3 N18.3    7. Osteopenia M89.9     M94.9          Assessment:     Patient has mild left lower lobe pneumonia which may be the cause for her weakness and fall 7/3. Pt. Is on antibiotics for 2 more days, and will use thickener if she coughs with thin liquids per SLP. She overall seems to have more energy than on last visit, and no left knee buckling/jiggling observed today when walking.  Patient demonstrates understanding/independence with home program.    Goal Status:  Pt. will be able to walk : 20 minutes;for community mobility;for household mobility;in 12 weeks;Comment  Comment:: with good gait speed and no need to touch walls  Patient will transfer: for in/out of chair;in 12 weeks;Comment  Comment: without use of hands 8 reps      Plan / Patient Education:   Gradually resume all previously issued exercises, and work on balance and endurance -6 MWT  Continue with initial plan of care.  Progress with home program as tolerated.    Subjective:     Pain Ratin  Pt reports falling 17 while walking in her hallway at 9 pm at night to get her meds, and her legs gave out suddenly and she fell to her knees. Patient went to [facility] ER and was discharged 17 and diagnosed Mild left lower lobe infiltrate per Xray (high WBC count). Head CT, and cervical CT show no new processes, mild expected changes in brain and spine.    Objective:     Left anterior knee has contusion and bandage possibly from the rug and fall.    Pt was able to walk up 4  steps with railing foot over foot, caught left toe on stair despite cues to bend hip more- but no loss of balance. Able to go down 4 steps with handrail support and no loss of balance and no dragging toe.    Hip flexion: Right 5/5, left 4/5      Balance in standing looks normal- 6 inches apart at heels    Gait speed is faster and better than on 7/3/17  Sit to stand with no arms- one rep and good technique    Treatment Today     TREATMENT MINUTES COMMENTS   Evaluation     Self-care/ Home management     Manual therapy     Neuromuscular Re-education     Therapeutic Activity     Therapeutic Exercises 55 Added seated hip flexor AROM, and hip abd stretching to improve gait and foot clearance.Pt will also work on sit to stand technique, and continue double heel raises until next visit. We also did discuss the background of this recent fall to uncover if there were any safety concerns that could prevent it (none uncovered). We did review stair climbing and sit to stand technique.   Gait training     Modality__________________                Total 60    Blank areas are intentional and mean the treatment did not include these items.       Fior Lundberg  7/10/2017

## 2021-06-11 NOTE — PROGRESS NOTES
[facility] Daily Progress     Patient Name: Bing Rodrigues  Date: 7/3/2017  Visit #:   Referral Diagnosis: Left Sided Weakness, Abnormality of Gait  Referring provider: Isabel Barrios MD  Visit Diagnosis:     ICD-10-CM    1. Generalized muscle weakness M62.81    2. Ataxic Gait R26.9    3. 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 History of CVA (cerebrovascular accident) Z86.73    5. Benign Essential Hypertension I10    6. Chronic Kidney Disease, Stage 3 N18.3    7. Osteopenia M89.9     M94.9          Assessment:   Patient is walking more erect, but is having a very difficult time with stiff gait and uses hip strategies( which hampers transfers). Her gait speed is faster within the first 30 feet of walking, and patient fatigues and begins slower gait speed.  Patient demonstrates understanding/independence with home program.  Patient is benefitting from skilled physical therapy and is making steady progress toward functional goals.    Goal Status:  Pt. will be able to walk : 20 minutes;for community mobility;for household mobility;in 12 weeks;Comment  Comment:: with good gait speed and no need to touch walls  Patient will transfer: for in/out of chair;in 12 weeks;Comment  Comment: without use of hands 8 reps  Patient is able to transfer from the toilet with ease, but is unable to transfer from chair of sofa without use iof arm    Plan / Patient Education:     Continue with initial plan of care.  Progress with home program as tolerated.    Subjective:     Pain Ratin  It was hard to do the bed exercise laying on my stomach- I couldn't;t roll over and I almost needed help to get up.    Objective:     Prone is too difficult for this patient to transition in and out of for stretching hips and back.  Bilat Heelcord length is +2 degrees,  Hip flexor length is 5 degrees bilat  Upper thoracic kyphosis is present and still causes hip flexion.   Patient tends to use hip strategy for balance during  walking and transfers.  We spent time discussing alternatives during sit to stand to enhance balance, and my suggestion was to have patient pause MCFP down to seated position and flex hips and knees more- arms reaching forward. From 17 seat height patient is unable to transfer sit to stand without loss of balance.    Patient did walk 300 feet without cane, normal stride passing opposite midfoot 50% of the time.    Treatment Today     TREATMENT MINUTES COMMENTS   Evaluation     Self-care/ Home management     Manual therapy     Neuromuscular Re-education     Therapeutic Activity     Therapeutic Exercises 55 Gait without cane working on longer steps. During transfers she uses momentum, and tends to lose balance backwards and flop into the chair. Added and performed standing back arches, and supine back arches but patient had difficulty fully extend hips in standing so we had to lean pelvis on the counter. See flow sheet   Gait training     Modality__________________                Total 60    Blank areas are intentional and mean the treatment did not include these items.       Fior Lundberg  7/3/2017

## 2021-06-11 NOTE — TELEPHONE ENCOUNTER
Looks like the omeprazole liquid PA was denied. The only other GERD medication that comes in a liquid is famotidine. I dont see that she tried that -- ok to try? I'll start with 20 mg two times a day. I called her pharmacy and they can get it so I will send an order in. Can someone call pharmacy to see if it was covered? Maybe give them a few hours before you call. Thanks!

## 2021-06-11 NOTE — TELEPHONE ENCOUNTER
Mila, I called the pharmacy and this is covered by insurance but will still cost the patient 318.70 dollars.

## 2021-06-11 NOTE — TELEPHONE ENCOUNTER
Refill Approved    Rx renewed per Medication Renewal Policy. Medication was last renewed on 9/1/20.    Jennifer James, [facility] Triage/Med Refill 9/9/2020     Requested Prescriptions   Pending Prescriptions Disp Refills     famotidine (PEPCID) 40 mg/5 mL (8 mg/mL) oral suspension [Pharmacy Med Name: FAMOTIDINE 40 MG/5 ML SUSP] 150 mL 0     Sig: TAKE 2.5 ML (20 MG TOTAL) BY MOUTH 2 (TWO) TIMES A DAY.       GI Medications Refill Protocol Passed - 9/8/2020 10:05 AM        Passed - PCP or prescribing provider visit in last 12 or next 3 months.     Last office visit with prescriber/PCP: 9/1/2020 Binta Wheeler MD OR same dept: 9/1/2020 Binta Wheeler MD OR same specialty: 9/1/2020 Binta Wheeler MD  Last physical: Visit date not found Last MTM visit: Visit date not found   Next visit within 3 mo: Visit date not found  Next physical within 3 mo: Visit date not found  Prescriber OR PCP: Binta Wheeler MD  Last diagnosis associated with med order: 1. Esophagitis  - famotidine (PEPCID) 40 mg/5 mL (8 mg/mL) oral suspension [Pharmacy Med Name: FAMOTIDINE 40 MG/5 ML SUSP]; Take 2.5 mL (20 mg total) by mouth 2 (two) times a day.  Dispense: 150 mL; Refill: 0    If protocol passes may refill for 12 months if within 3 months of last provider visit (or a total of 15 months).

## 2021-06-11 NOTE — PROGRESS NOTES
[facility] Daily Progress     Patient Name: Bing Rodrigues  Date: 2017  Visit #:  Referral Diagnosis: Left Sided Weakness, Abnormality of Gait  Referring provider: Isabel Barrios MD  Visit Diagnosis:     ICD-10-CM    1. Generalized muscle weakness M62.81    2. Ataxic Gait R26.9    3. 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 History of CVA (cerebrovascular accident) Z86.73    5. Benign Essential Hypertension I10    6. Chronic Kidney Disease, Stage 3 N18.3    7. Osteopenia M89.9     M94.9          Assessment:   Patient is moving slightly faster overall during gait, but when fatigued she is very slow. She is observed to become distracted and this slows her down as well, even in a quiet environment.  Patient demonstrates understanding/independence with home program.  Patient is appropriate to continue with skilled physical therapy intervention, as indicated by initial plan of care.    Goal Status:  Pt. will be able to walk : 20 minutes;for community mobility;for household mobility;in 12 weeks;Comment  Comment:: with good gait speed and no need to touch walls  Patient will transfer: for in/out of chair;in 12 weeks;Comment  Comment: without use of hands 8 reps      Plan / Patient Education:   Try swiss ball seated for pelvic rocking, hip stretching  Continue with initial plan of care.  Progress with home program as tolerated.    Subjective:     Pain Ratin  Still able to get up from dining room chair with use of only one hand      Objective:     6 MWT - 360 feet, no cane and slight slow stride on left, both arms in high guard position  SL stand left 0 seconds, right 1 second  SL heel raises: left 3 reps , right 12 reps    Patient has great difficulty rolling and though she denies pain she is uncomfortable and doesn't like laying on her stomach.  She was able to lay supine to do the morning stretch with one pillow under head and another under her back but this was a challenge.  She does  have kyphotic and stiff spine, and bilat hip tightness as well      Treatment Today     TREATMENT MINUTES COMMENTS   Evaluation     Self-care/ Home management     Manual therapy     Neuromuscular Re-education     Therapeutic Activity     Therapeutic Exercises 55  6 MWT, SL and double heel raises, SL balance, prone lie, supine morning stretch, heelcord stretch, front lunge with hand support bilat   Gait training     Modality__________________                Total 60    Blank areas are intentional and mean the treatment did not include these items.       Fior Lundberg  6/21/2017

## 2021-06-11 NOTE — PROGRESS NOTES
[facility] Re-Certification Request    June 16, 2017      Patient: Bing Rodrigues  MR Number: [id number]  YOB: 1950  Date of Visit: 6/16/2017      Dear Dr. Barrios,    As you may recall, we have been seeing Bing Rodrigues for Physical Therapy of Ataxic gait and LE weakness.    For therapy to continue, Medicare and/or Medicaid requires periodic physician review of the treatment plan. Please review the summary of the patient's progress and our plan for continued therapy, and verify  that you agree therapy should continue by entering certification dates at the bottom of this note and co-signing this note.    Plan of Care  No Data Recorded    Goal  Pt. will be able to walk : 20 minutes;for community mobility;for household mobility;in 12 weeks;Comment  Comment:: with good gait speed and no need to touch walls  Patient will transfer: for in/out of chair;in 12 weeks;Comment  Comment: without use of hands 8 reps  No Data Recorded      If you have any questions or concerns, please don't hesitate to call.    Sincerely,      Fior Lundberg, PT        Physician recommendation:     ___ Follow therapist's recommendation        ___ Modify therapy      *Physician co-signature indicates they certify the need for these services furnished within this plan and while under their care.

## 2021-06-11 NOTE — PROGRESS NOTES
Assessment:     Bing was seen today for hospital visit follow up.    Diagnoses and all orders for this visit:    Community acquired pneumonia    Diarrhea    Essential hypertension        Plan:     1. Community acquired pneumonia  Patient seems to improve from a community-acquired pneumonia on outpatient cephalexin.  Since she has loose stools for can stop the cephalexin today.    2. Diarrhea  Because of loose stools cephalexin will be stopped today.  She has had 2 stools today and she will observe.  Should her stools increase we will do a workup for C. difficile toxin    3. Essential hypertension  Patient takes both her blood pressure pills in the morning and says normally her blood pressure that does go up later in the day which I am seeing on today's visit.  Because she she just has a fall and just been hospitalized for possible sepsis and community-acquired pneumonia will not add another blood pressure pill.  We will have her come back in 2-3 weeks to assess how she levels out.  She agrees with this plan.  At this point in time we could reassess for loose stools also.  Currently not enough problem to warrant testing for C. difficile.      This is a 25 minute visit with greater than 50% of the time spent counseling regarding discussion regarding blood pressure and her current thoughts on management.      Subjective:      Chandrakant is a 67 y.o. female presenting to my clinic for evaluation post hospitalization.  She was in the hospital from last week from Wednesday, July 5 - July 7.    She had a stroke previously in 2013 that affected her speech for swallowing and left arm left leg weakness.  Those issues remain.    She had another swallow study in the hospital that was unchanged.  She has to eat soft foods and be very careful when she drinks any liquids.    Her blood pressure is elevated today at 164/80.  She takes her lisinopril in the morning and her amlodipine in the morning.  She says quite often she  notices blood pressure rising later in the day.    She went to the hospital after a fall.  Because of weakness she was admitted and they felt her diagnosis was possible sepsis with community acquired pneumonia.  I do not see any positive blood cultures..    She is developing some loose stool she tells me from the antibiotic.  From loose stools we are saying that she is only having 2 stools a day that are loose but not watery      Current Outpatient Prescriptions on File Prior to Visit   Medication Sig Dispense Refill     acetaminophen (TYLENOL) 500 MG tablet Take 500-1,000 mg by mouth every 6 (six) hours as needed for pain.       amLODIPine (NORVASC) 10 MG tablet Take 10 mg by mouth daily.       aspirin 81 MG EC tablet Take 81 mg by mouth daily.       cholecalciferol, vitamin D3, 1,000 unit tablet Take 1,000 Units by mouth daily.       lisinopril (PRINIVIL,ZESTRIL) 40 MG tablet Take 1 tablet (40 mg total) by mouth daily. 90 tablet 3     omeprazole (PRILOSEC) 20 MG capsule Take 1 capsule by mouth 2 (two) times a day.       vitamin E 400 UNIT capsule Take 400 Units by mouth daily.       [DISCONTINUED] cefdinir (OMNICEF) 300 MG capsule Take 1 capsule (300 mg total) by mouth 2 times a day at 6:00 am and 4:00 pm for 5 days. 10 capsule 0     No current facility-administered medications on file prior to visit.      No Known Allergies  Past Medical History:   Diagnosis Date     Hypertension      Stroke      Past Surgical History:   Procedure Laterality Date     TX DILATION/CURETTAGE,DIAGNOSTIC      Description: Dilation And Curettage;  Recorded: 02/07/2013;     Social History     Social History     Marital status:      Spouse name: N/A     Number of children: N/A     Years of education: N/A     Occupational History     Not on file.     Social History Main Topics     Smoking status: Never Smoker     Smokeless tobacco: Not on file     Alcohol use No     Drug use: No     Sexual activity: No     Other Topics Concern      Not on file     Social History Narrative     Family History   Problem Relation Age of Onset     Stroke Maternal Grandmother      Stroke Maternal Grandfather      Early death Paternal Grandmother      COPD Paternal Grandfather      Cancer Father      from smoking       ROS:  I have performed a 10 point ROS.  All pertinent positives and negatives are found in the HPI.  All others are negative.    Patient reports a loose stools with 2 loose stools a day while on the cephalosporin antibiotic.  Also reports occasional coughing    Objective:     Physical Exam:  /84  Pulse 68  Wt 121 lb (54.9 kg)  BMI 23.63 kg/m2  General Appearance: Alert, cooperative, no distress, appears stated age  Head: Normocephalic, without obvious abnormality, atraumatic  Eyes: PERRL, conjunctiva/corneas clear, EOM's intact  Ears: Normal TM's and external ear canals, both ears  Nose: Nares normal, septum midline,mucosa normal, no drainage  Throat: Lips, mucosa, and tongue normal; teeth and gums normal  Neck: Supple, symmetrical, trachea midline, no adenopathy;  thyroid: not enlarged, symmetric, no tenderness/mass/nodules; no carotid bruit or JVD  Lungs: Clear to auscultation bilaterally, respirations unlabored  Heart: Regular rate and rhythm, S1 and S2 normal, no murmur, rub, or gallop,   Chest/Breasts: No breast masses, tenderness, asymmetry, or nipple discharge.  Abdomen: Soft, non-tender, bowel sounds active all four quadrants,  no masses, no organomegaly  Back: Symmetric, no curvature, ROM normal, no CVA tenderness  Extremities: Weakness of left arm and left leg.  Notable dysarthria and coordination of speech with difficulties  Skin: Skin color, texture, turgor normal, no rashes or lesions  Lymph nodes: Cervical, supraclavicular, and axillary nodes normal  Neurologic: Neuro deficits unchanged with dysarthria and some left arm and left leg weakness.  Walks with a cane  Mental status:  Appropriate, Affect normal    Labs:  Labs from  hospital reviewed    Imaging: X-ray from hospital reviewed

## 2021-06-12 NOTE — TELEPHONE ENCOUNTER
Refill Request  Did you contact pharmacy: No  Medication name:   Requested Prescriptions     Pending Prescriptions Disp Refills     amLODIPine (NORVASC) 5 MG tablet 90 tablet 3     Si tablet (5 mg total) by G-tube route daily.     Who prescribed the medication: Binta Wheeler MD   Requested Pharmacy: [facility]  Is patient out of medication: Yes  Patient notified refills processed in 3 business days:  no  Okay to leave a detailed message: yes    Caller stated that he called this in since Monday and still no respond. Patient only has 1 left now and is needing refills sent over as soon as possible today.

## 2021-06-12 NOTE — PROGRESS NOTES
CHW reviewed CCC RN chart note from 10/29/20- goals were discussed and updated  Current CHW delegation, CHW f/u in 2 weeks  Next CHW outreach due: 11/12/2020

## 2021-06-12 NOTE — PROGRESS NOTES
[facility] Daily Progress /Monthly Summary    Patient Name: Bing Rodrigues  Date: 7/31/2017  Visit #: 6/12 + 12  Referral Diagnosis: Left sided weakness, Abnormality of Gait  Referring provider: Isabel Barrios MD  Visit Diagnosis:     ICD-10-CM    1. Generalized muscle weakness M62.81    2. Ataxic Gait R26.9    3. 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 History of CVA (cerebrovascular accident) Z86.73    5. Benign Essential Hypertension I10    6. Chronic Kidney Disease, Stage 3 N18.3    7. Osteopenia M89.9     M94.9          Assessment:     Patient seems to have reached a plateau where she states she know the exercises, but performs them incorrectly and with momentum. Pt reports she has not fallen but still struggles to get off the couch. Lack of progress at this point is related to need for quad strength with knee flexed 90 degrees, and with gastroc strength for balance. Patient has a home program and flow sheet to address these issues, and we will see if she can work independently to achieve a goal of sit to stand 6 reps from 17 inch seat height, and 6 - 10 reps single leg heel raises    Goal Status:  Pt. will be able to walk : 20 minutes;for community mobility;for household mobility;in 12 weeks;Comment  Comment:: with good gait speed and no need to touch walls  Patient will transfer: for in/out of chair;in 12 weeks;Comment  Comment: without use of hands 8 reps     Getting up out of chairs still hard- multiple attempts to transfer sit to stand from waiting room love seat and she reports getting up from couch is also hard.  Today with coaching she is able to get 3 reps from 17 inches sit to stand  Patient is able to walk over 120 feet without device, but gait speed is very slow and it can take over 3 minutes to walk this far even on a level surface.      Plan / Patient Education:     Plan to recheck patient in sit to stand and single leg heel raises in 3-4 weeks, and then work on dynamic balance  and gait speed. If patient has no changes in progress to these goals, it may be best to discontinue PT at that time    Subjective:     Pain Ratin  Today is my last scheduled PT.  Left knee is not painful, and not buckling    Objective:     Nice long steps using SEC to walk 100 feet, but does slow down gait speed considerably after 5 feet, and it can take 3 minutes to walk over 100 feet. Patient demonstrates inconsistent step length, high guard position of the hands, and after 50 feet she does tend to have incomplete left foot clearance.    Patient still needed verbal cueing with all exercises issued, including sit to stand. She still rocks forward and uses momentum to rise without use of hands, hyperextends knees and falls backwards losing her balance. She was able to string 2 good attempts to rise slowly by really bending knees under the chair and by leaning further forward.    Patient reminded not to use momentum and avoid pelvic hike during side leg raises, and when doing double heel raises to stand with ankles 4 inches apart ans roll up symmetrically with only fingertip touch and not entire hand.    During single leg heel raise pt. is unable to get either heel more than 1/2 inch off the ground for even one repetition.(3-/5 strength bilat.    Patient's movement quality is halting and slightly rigid: element of hypertonia and dysmetria in core and extremities.    Treatment Today     TREATMENT MINUTES COMMENTS   Evaluation     Self-care/ Home management     Manual therapy     Neuromuscular Re-education     Therapeutic Activity     Therapeutic Exercises 60 Reviewed and condensed HEP and issued a flow sheet for patient to follow. 3x/wk sit to stand, side leg raise standing and double heel raises. Daily SL heel raise and x1 viewing in tandem stand   Gait training     Modality__________________                Total 60    Blank areas are intentional and mean the treatment did not include these items.       Firo FAIRBANKS  Smida  7/31/2017

## 2021-06-12 NOTE — PROGRESS NOTES
Clinic Care Coordination Contact    Follow Up Progress Note      Assessment: Spoke with patient's , Oscar. It appears patient was able to get liquid famotidine recently according to Oscar. He stated all of medications have not been transferred to [facility]. He said said he was unable to get one medication transferred, but could not remember what it was. Asked if he spoke with the [facility] and he said he had not. He said she did speak with the [facility] pharmacist regarding her medication, however.   [facility] RN will forward note to [facility] to see if she can assist with patient's medications.      Goals addressed this encounter:   Goals Addressed                 This Visit's Progress       Patient Stated      Medication 1 (pt-stated)        Goal Statement: I would like to transfer my medications from [facility] to [facility] mail to home pharmacy in the next 2-4 weeks.  Date Goal set: 7/28/20  Barriers: coordination of care, medically complex  Strengths: motivated  Date to Achieve By: ongoing  Patient expressed understanding of goal: discussed with spouse    Action steps to achieve this goal:  1. Spouse spoke with [facility] mail to home pharmacy yesterday to start the process.(completed)  2. I will have my  answer the phone when Daniel RN calls to check on the status of medication inventory.   3. [facility] RN will call home care nurse to check on status of spouse knowledge of medications.  4.  I will call [facility] once we need additional refills.   5. I will update CHW when our mailbox is fixed, as they have not been able to receive mail for a 8 days.    Discussed and updated 10/29/2020  Updated: 8/19/2020        Medication 1 (pt-stated)        Goal Statement: I would like to speak with the pharmacist at the clinic to better understand Larry medication in the next 30 days.  Date Goal set: 8/19/20  Barriers: cognition,complex medication regimen  Strengths: motivated  Date to Achieve By: 30 days  Patient expressed understanding of goal:  yes  Action steps to achieve this goal:  1. I spoke with the pharmacist at [facility] to understand Bing's medications.  2. She is now taking liquid famotidine for acid reflux.  3. CHW will follow up with patient at next outreach and schedule MTM appointment if desired    Discussed and Updated 10/29/2020- CCC RN will send a note to MTM to discuss meeting with patient about medication concerns.              Intervention/Education provided during outreach: Discussed the importance of patient taking her medications as directed.           Plan: CCC RN will continue to monitor, forward note to MTM, support patient with current goals and will be available as nursing needs arise.     Care Coordinator will follow up in two weeks.

## 2021-06-12 NOTE — PROGRESS NOTES
Assessment:     Bing was seen today for follow-up.    Diagnoses and all orders for this visit:    HTN (hypertension)  -     hydroCHLOROthiazide (MICROZIDE) 12.5 mg capsule; Take 1 capsule (12.5 mg total) by mouth daily.    Pneumonia    Swallowing disorder    CVA, old, dysarthria        Plan:   . CVA, old, dysarthria  CVA is old with residual gait disturbance and dysarthria and swallow issues    Pneumonia  Respiratory disease seems to have come back to baseline and resolved.  Clear lungs.  Her episode of leg weakness was secondary to infection which is likely secondary to her swallow problems and pneumonia.     Swallowing disorder  Ongoing mild swallow issues with likely contribution to her recent pneumonia     HTN (hypertension)  Hypertension posthospitalization noted in the last months follow-up visit.  Blood pressure still elevated today.  I decided to add a low-dose diuretic to chlorothiazide 12.5 mg.  Patient will come back in another month to recheck blood pressure and I will do another set of renal labs  - hydroCHLOROthiazide (MICROZIDE) 12.5 mg capsule; Take 1 capsule (12.5 mg total) by mouth daily.  Dispense: 90 capsule; Refill: 3      This is a 25 minute visit with greater than 50% of the time spent counseling regarding review of hospitalization.  I counseled patient regarding her symptoms leading to hospitalization i.e. weak legs.  This was this symptom of infection as her white count was in the 16 K range.  This likely source was pneumonia likely from some degree of aspiration.      Subjective:      Chandrakant is a 67 y.o. female presenting to my clinic for follow-up of pneumonia.  She was hospitalized in July with symptoms of leg weakness and fatigue.  She was found to have a swallow issues mild and a left lower lobe pneumonia that could have been an aspiration pneumonia.    She resolved after and was discharged 24 hours.  First follow-up visit she was doing well but borderline blood pressure.   Brought her back today to make sure that her pneumonia completely resolved and her fatigue completely resolved which she says it seems to have.  She no longer has weak legs.  She is careful when she swallows and some things she chokes on.    She is on max dose of lisinopril-40 mg and amlodipine-10 mg.  We discussed adding another blood pressure pill to help her control her blood pressure..        Current Outpatient Prescriptions on File Prior to Visit   Medication Sig Dispense Refill     acetaminophen (TYLENOL) 500 MG tablet Take 500-1,000 mg by mouth every 6 (six) hours as needed for pain.       amLODIPine (NORVASC) 10 MG tablet Take 10 mg by mouth daily.       aspirin 81 MG EC tablet Take 81 mg by mouth daily.       cholecalciferol, vitamin D3, 1,000 unit tablet Take 1,000 Units by mouth daily.       lisinopril (PRINIVIL,ZESTRIL) 40 MG tablet Take 1 tablet (40 mg total) by mouth daily. 90 tablet 3     omeprazole (PRILOSEC) 20 MG capsule Take 1 capsule by mouth 2 (two) times a day.       vitamin E 400 UNIT capsule Take 400 Units by mouth daily.       No current facility-administered medications on file prior to visit.      No Known Allergies  Past Medical History:   Diagnosis Date     Hypertension      Stroke      Past Surgical History:   Procedure Laterality Date     [place] DILATION/CURETTAGE,DIAGNOSTIC      Description: Dilation And Curettage;  Recorded: 02/07/2013;     Social History     Social History     Marital status:      Spouse name: N/A     Number of children: N/A     Years of education: N/A     Occupational History     Not on file.     Social History Main Topics     Smoking status: Never Smoker     Smokeless tobacco: Not on file     Alcohol use No     Drug use: No     Sexual activity: No     Other Topics Concern     Not on file     Social History Narrative     Family History   Problem Relation Age of Onset     Stroke Maternal Grandmother      Stroke Maternal Grandfather      Early death Paternal  Grandmother      COPD Paternal Grandfather      Cancer Father      from smoking       ROS:  I have performed a 10 point ROS.  All pertinent positives and negatives are found in the HPI.  All others are negative.    No recent swallow event.  No fevers, no leg weakness.    Objective:     Physical Exam:  /80  Pulse 64  Wt 124 lb 14.4 oz (56.7 kg)  BMI 24.39 kg/m2  General Appearance: Alert, cooperative, no distress, appears stated age  Head: Normocephalic, without obvious abnormality, atraumatic  Eyes: PERRL, conjunctiva/corneas clear, EOM's intact  Ears: Normal TM's and external ear canals, both ears  Nose: Nares normal, septum midline,mucosa normal, no drainage  Throat: Lips, mucosa, and tongue normal; okay teeth in poor repair  Neck: Supple, symmetrical, trachea midline, no adenopathy;  thyroid: not enlarged, symmetric, no tenderness/mass/nodules; no carotid bruit or JVD  Lungs: Clear to auscultation bilaterally, respirations unlabored  Heart: Regular rate and rhythm, S1 and S2 normal, no murmur, rub, or gallop,     Extremities: Extremities normal, atraumatic, no cyanosis or edema    Lymph nodes: Cervical, supraclavicular, and axillary nodes normal  Neurologic: Ataxic gait noted.  Dysarthria noted.  Coordination of swallowing and speech careful.  Mental status:  Appropriate, Affect normal    Labs:  Hospital labs reviewed    Imaging: Chest x-ray from hospital reviewed with patient showing left lower lobe infiltrate.  Swallow study showed no aspiration but penetration of liquids with swallowing  Labs reviewed showing on admission height 16 K white count.

## 2021-06-12 NOTE — PROGRESS NOTES
[facility] Re-Certification Request    August 30, 2017      Patient: Bing Rodrigues  MR Number: [id number]  YOB: 1950  Date of Visit: 8/30/2017      Dear Dr. Isabel Barrios,    As you may recall, we have been seeing Bing Rodrigues for Physical Therapy of Left sided weakness, Abnormality of Gait.    For therapy to continue, Medicare and/or Medicaid requires periodic physician review of the treatment plan. Please review the summary of the patient's progress and our plan for continued therapy, and verify  that you agree therapy should continue by entering certification dates at the bottom of this note and co-signing this note.    We are decreasing frequency of PT and relying on patient to do her home exercise program. Patient has not fallen in the past month, but does note walking for longer distances is limited to 6 minutes,  and bus steps are difficult    Plan of Care  Authorization / Certification Start Date: 08/30/17  Authorization / Certification End Date: 11/30/17  Authorization / Certification Number of Visits: 4  Communication with: Referral Source  Patient Related Instruction: Nature of Condition;Treatment plan and rationale;Self Care instruction;Basis of treatment;Body mechanics;Posture;Precautions;Expected outcome  Times per Week: 1- 2 x/month  Number of Weeks: 12  Number of Visits: 4  Discharge Planning: Home exercises  Therapeutic Exercise: ROM;Stretching;Strengthening  Neuromuscular Reeducation: posture;balance/proprioception;vestibular;stroke rehabilitation  Manual Therapy: strain counterstrain  Manual Therapy: as needed  Functional Training (ADL's): ADL's;compensatory training;instructions in transfers;ergonomics;safety procedures  Gait Training: balance and speed changes, power work    Goal  Pt. will show improved balance for safer : for household ambulation;for community ambulation;for other activities;in 12 weeks  Other Activity:: bus steps  Pt. will be able to walk : 20  minutes;for community mobility;for household mobility;in 12 weeks;Comment  Comment:: with good gait speed and no need to touch walls  Patient will transfer: for in/out of chair;in 12 weeks;Comment  Comment: without use of hands 8 reps        If you have any questions or concerns, please don't hesitate to call.    Sincerely,      Fior Lundberg, PT        Physician recommendation:     ___ Follow therapist's recommendation        ___ Modify therapy      *Physician co-signature indicates they certify the need for these services furnished within this plan and while under their care.    [facility] Daily Progress /Monthly Summary    Patient Name: Bing Rodrigues  Date: 8/30/2017  Visit #: 7/12 + 12  Referral Diagnosis: Left sided weakness, Abnormality of Gait  Referring provider: Isabel Barrios MD  Visit Diagnosis:     ICD-10-CM    1. Generalized muscle weakness M62.81    2. Ataxic Gait R26.9    3. 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 History of CVA (cerebrovascular accident) Z86.73    5. Benign Essential Hypertension I10    6. Chronic Kidney Disease, Stage 3 N18.3    7. Osteopenia M89.9     M94.9          Assessment:   Patient still very stiff but is functional with sit to stand. She needs to work on strength and balance to achieve bus steps and balance being easier.  Patient demonstrates understanding/independence with home program.  Patient is benefitting from skilled physical therapy and is making steady progress toward functional goals.    Goal Status:  Pt. will show improved balance for safer : for household ambulation;for community ambulation;for other activities;in 12 weeks  Other Activity:: bus steps  Pt. will be able to walk : 20 minutes;for community mobility;for household mobility;in 12 weeks;Comment  Comment:: with good gait speed and no need to touch walls  Patient will transfer: for in/out of chair;in 12 weeks;Comment  Comment: without use of hands 8 reps    Patient reports she has been  able to get off the bed, chair, and toilet with minimal use of hands and patient reports she is able to do this easier.  Walking is about the same per patient    Plan / Patient Education:   Check sit to stand, check SL stand (goal is 3 seconds B), 6 minute walk test (last test on  was 211 feet)   Continue with initial plan of care.  Progress with home program as tolerated.    Subjective:     Pain Ratin  I had a hard time getting up the bus steps today.  Left leg is weaker than the right but knee is not buckling.  I haven't fallen since the last time I saw you      Objective:     SIt to stand is 7 reps from 18 inch seat, 5 reps from 17 inch seat height  SL stand: with much coaching to shift pelvis onto foot patient able to stand 1 second, 1 rep out of 10 attempts    Gait is still ataxic and stiff with hands in high guard position and very little trunk rotation- no use of cane and patient sometimes loses balance slightly and other times reaches out for wall. Ask ed patient to add high step, long steps, and trunk rotation exaggeration at home     Treatment Today     TREATMENT MINUTES COMMENTS   Evaluation     Self-care/ Home management     Manual therapy     Neuromuscular Re-education     Therapeutic Activity     Therapeutic Exercises 55 See above and flow sheet for new exercises. Did add trunk mobility, ankle balance activities, and mimic bus steps on steps at home with rail   Gait training     Modality__________________                Total 60    Blank areas are intentional and mean the treatment did not include these items.       Fior Lundberg  2017

## 2021-06-12 NOTE — PROGRESS NOTES
[facility] Daily Progress     Patient Name: Bing Rodrigues  Date: 2017  Visit #:   Referral Diagnosis: Left sided weakness, Abnormality of Gait  Referring provider: Isabel Barrios MD  Visit Diagnosis:     ICD-10-CM    1. Generalized muscle weakness M62.81    2. Ataxic Gait R26.9    3. 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 History of CVA (cerebrovascular accident) Z86.73    5. Benign Essential Hypertension I10    6. Chronic Kidney Disease, Stage 3 N18.3    7. Osteopenia M89.9     M94.9          Assessment:     Patient is definitely working on her home exercise program as evidenced by improvement in sit to stand. She still has a hard time standing up from the sofa.    Goal Status:  Pt. will be able to walk : 20 minutes;for community mobility;for household mobility;in 12 weeks;Comment  Comment:: with good gait speed and no need to touch walls  Patient will transfer: for in/out of chair;in 12 weeks;Comment  Comment: without use of hands 8 reps     Sit to stand improved      Plan / Patient Education:     Continue exercise, work on balance activites    Subjective:     Pain Ratin  The left knee is recovering well and not painful.  I still feel like I am recovering from my infection, and that I have fallen behind in my PT. It is harder to do things, harder to get up, and I feel unsteady at times when walking (scattered throughout the day).    Patient reports it takes her one hour to fall sleep, but is able to sleep well, and gets a total of 7 hours sleep.      Objective:     Worked on walking 100 feet- no SEC, long stride and quick stride - one time each  Sit to stand test: 4 reps from 17 inches after she had already been doing slow squats.      Treatment Today     TREATMENT MINUTES COMMENTS   Evaluation     Self-care/ Home management     Manual therapy NC- -5 min SCS to right VEST-N, R LFALX-N   Neuromuscular Re-education     Therapeutic Activity     Therapeutic Exercises 55 Sere flow  sheet- exercises and gait without cane working on diagonal pattern walking and quad strength.   Gait training     Modality__________________                Total 60    Blank areas are intentional and mean the treatment did not include these items.       Fior Lundberg  7/24/2017

## 2021-06-12 NOTE — TELEPHONE ENCOUNTER
"Triage Call:   Pharmacy is calling wanting the directions for the patients medication of Omeprazole (Prilosec) 20mg capsule. No directions listed and no \"Please see attached for detailed directions\" sent to pharmacy.     Pt has 2 medications of Omeprazole on chart, noted that the Omeprazole 20mg capsule was \"stop taking at discharge\" on 8-.  Informed Pharmacist that pt has a medication of Omeprazole (Prilosec) 2mg/mL SusR suspension listed, last ordered on 8/14/2020 with 3 refills noted for Enteral Tube.  Pharmacist also noted that pt is taking famotidine (pepcid) 40 mg/5 mL (8 mg/mL) oral suspension, last ordered on 9/9/2020.    Pharmacist states he will wait for the patient to call.     Ursula Toure RN Nurse Triage 11/3/2020 5:14 PM     Additional Information    Negative: Drug overdose and nurse unable to answer question    Negative: Caller requesting information not related to medicine    Negative: Caller requesting a prescription for Strep throat and has a positive culture result    Negative: Rash while taking a medication or within 3 days of stopping it    Negative: Immunization reaction suspected    Negative: [1] Asthma and [2] having symptoms of asthma (cough, wheezing, etc)    Negative: MORE THAN A DOUBLE DOSE of a prescription or over-the-counter (OTC) drug    Negative: [1] DOUBLE DOSE (an extra dose or lesser amount) of over-the-counter (OTC) drug AND [2] any symptoms (e.g., dizziness, nausea, pain, sleepiness)    Negative: [1] DOUBLE DOSE (an extra dose or lesser amount) of prescription drug AND [2] any symptoms (e.g., dizziness, nausea, pain, sleepiness)    Negative: Took another person's prescription drug    Pharmacy calling with prescription question and triager answers question    Negative: [1] DOUBLE DOSE (an extra dose or lesser amount) of over-the-counter (OTC) drug AND [2] NO symptoms    Negative: [1] DOUBLE DOSE (an extra dose or lesser amount) of antibiotic drug AND [2] NO symptoms    " "Negative: Caller has medication question about med not prescribed by PCP and triager unable to answer question (e.g., compatibility with other med, storage)    Negative: Caller has NON-URGENT medication question about med that PCP prescribed and triager unable to answer question    Negative: Caller requesting a NON-URGENT new prescription or refill and triager unable to refill per unit policy    Negative: [1] DOUBLE DOSE (an extra dose or lesser amount) of prescription drug AND [2] NO symptoms (Exception: a double dose of antibiotics)    Negative: Diabetes drug error or overdose (e.g., insulin or extra dose)    Negative: [1] Request for URGENT new prescription or refill of \"essential\" medication (i.e., likelihood of harm to patient if not taken) AND [2] triager unable to fill per unit policy    Negative: [1] Prescription not at pharmacy AND [2] was prescribed today by PCP    Negative: Pharmacy calling with prescription questions and triager unable to answer question    Negative: Caller has URGENT medication question about med that PCP prescribed and triager unable to answer question    Negative: Caller requesting a refill, no triage required, and triager able to refill per unit policy    Negative: Caller requesting information about medication use with breastfeeding; neither adult nor infant is ill, and triager answers question    Negative: Caller requesting information about medication during pregnancy; adult is not ill and triager answers question    Negative: Caller has medication question, adult has minor symptoms, caller declines triage, and triager answers question    Negative: Caller has medication question only, adult not sick, and triager answers question    Protocols used: MEDICATION QUESTION CALL-A-      "

## 2021-06-12 NOTE — PROGRESS NOTES
Assessment:     Bing was seen today for follow-up.    Diagnoses and all orders for this visit:    Health care maintenance  -     Influenza High Dose, Seasonal 65+ yrs  -     Pneumococcal conjugate vaccine 13-valent 6wks-17yrs; >50yrs    HTN (hypertension), benign  -     Cancel: Renal Function Profile  -     Potassium          Plan:     1. HTN (hypertension), benign  We will pleased with the addition of hydrochlorothiazide to her previous blood pressure meds.  She does have a years refills on all her blood pressure meds currently.  My only concern is a drop in her potassium so we will drop potassium today.  If need be will add a small dose of potassium to her regime.  Pending result    2. Health care maintenance  She needs the flu shot and the Prevnar shot she agrees to both today.  - Influenza High Dose, Seasonal 65+ yrs  - Pneumococcal conjugate vaccine 13-valent 6wks-17yrs; >50yrs        Subjective:      Chandrakant is a 67 y.o. female presenting to my clinic for follow-up of hypertension.  We decided to add a low-dose diuretic to her blood pressure pills.  Her potassium as previously been checked in a renal panel and was 3.8.    She finds she tolerates this medicine well and her blood pressure is excellent today.  She has no complaints.  I decide we need to recheck her potassium so that we can give her some replacement if it looks like she is nearing a low or borderline potassium level.    She is due for flu vaccine and Prevnar 13 which should like today.  She does not drive so her [employer] mobility ride brings her here and takes her home..        Current Outpatient Prescriptions on File Prior to Visit   Medication Sig Dispense Refill     acetaminophen (TYLENOL) 500 MG tablet Take 500-1,000 mg by mouth every 6 (six) hours as needed for pain.       amLODIPine (NORVASC) 10 MG tablet Take 10 mg by mouth daily.       aspirin 81 MG EC tablet Take 81 mg by mouth daily.       cholecalciferol, vitamin D3, 1,000 unit  tablet Take 1,000 Units by mouth daily.       hydroCHLOROthiazide (MICROZIDE) 12.5 mg capsule Take 1 capsule (12.5 mg total) by mouth daily. 90 capsule 3     lisinopril (PRINIVIL,ZESTRIL) 40 MG tablet Take 1 tablet (40 mg total) by mouth daily. 90 tablet 3     omeprazole (PRILOSEC) 20 MG capsule Take 1 capsule by mouth 2 (two) times a day.       vitamin E 400 UNIT capsule Take 400 Units by mouth daily.       No current facility-administered medications on file prior to visit.      No Known Allergies  Past Medical History:   Diagnosis Date     Hypertension      Stroke      Past Surgical History:   Procedure Laterality Date     [place] DILATION/CURETTAGE,DIAGNOSTIC      Description: Dilation And Curettage;  Recorded: 02/07/2013;     Social History     Social History     Marital status:      Spouse name: N/A     Number of children: N/A     Years of education: N/A     Occupational History     Not on file.     Social History Main Topics     Smoking status: Never Smoker     Smokeless tobacco: Not on file     Alcohol use No     Drug use: No     Sexual activity: No     Other Topics Concern     Not on file     Social History Narrative     Family History   Problem Relation Age of Onset     Stroke Maternal Grandmother      Stroke Maternal Grandfather      Early death Paternal Grandmother      COPD Paternal Grandfather      Cancer Father      from smoking       ROS:  I have performed a 10 point ROS.  All pertinent positives and negatives are found in the HPI.  All others are negative.      Objective:     Physical Exam:  /70 (Patient Site: Right Arm, Patient Position: Sitting, Cuff Size: Adult Regular)  Pulse 60  Resp 20  Ht 5' (1.524 m)  Wt 122 lb 14.4 oz (55.7 kg)  BMI 24 kg/m2  General Appearance: Alert, cooperative, no distress, appears stated age    Head: Normocephalic, without obvious abnormality, atraumatic    Neck: Supple, symmetrical, trachea midline, no adenopathy;  thyroid: not enlarged, symmetric, no  tenderness/mass/nodules; no carotid bruit or JVD  Lungs: Clear to auscultation bilaterally, respirations unlabored  Heart: Regular rate and rhythm, S1 and S2 normal, no murmur, rub, or gallop,     Extremities: Extremities normal, atraumatic, no cyanosis or edema    Neurologic: Previous CVA with deficits unchanged  Mental status: Stable    Labs: Recent renal panel reviewed with a potassium of 3.8.  This is prior to starting a new diuretic

## 2021-06-13 NOTE — PROGRESS NOTES
CHW completed chart review today, last CC outreach was completed on 11/11 by CC RAOUL Crespo.  Goals were updated and discussed-  No delegations from 11/11 outreach call  Next CHW outreach= 12/9/2020    Goals        Patient Stated      Medication 1 (pt-stated)      Goal Statement: I would like to speak with the pharmacist at the clinic to better understand Bing's medication in the next 30 days.  Date Goal set: 8/19/20  Barriers: cognition,complex medication regimen  Strengths: motivated  Date to Achieve By: 30 days  Patient expressed understanding of goal: yes  Action steps to achieve this goal:  1. I spoke with the pharmacist at [facility] to understand Bing's medications.  2. She is now taking liquid famotidine for acid reflux.  3. CHW will follow up with patient at next outreach and schedule MTM appointment if desired    Discussed and Updated 10/29/2020- CCC RN will send a note to MTM to discuss meeting with patient about medication concerns.

## 2021-06-13 NOTE — TELEPHONE ENCOUNTER
Who is calling:  [facility]  Reason for Call: Pharmacy is checking on the refill status of the Citalopram. The patient is out of the medication.  Date of last appointment with primary care: 9/1/20  Okay to leave a detailed message: Yes

## 2021-06-13 NOTE — PROGRESS NOTES
[facility] Daily Progress/ Monthly Summary     Patient Name: Bing Rodrigues  Date: 10/2/2017  Visit #:   Referral Diagnosis: Left sided weakness, Abnormality of Gait  Referring provider: Isabel Barrios MD  Visit Diagnosis:     ICD-10-CM    1. Generalized muscle weakness M62.81    2. Ataxic Gait R26.9    3. History of CVA (cerebrovascular accident) Z86.73    4. Benign Essential Hypertension I10    5. Chronic Kidney Disease, Stage 3 N18.3          Assessment:      Patient is not remembering to do all her home exercises, but is remembering a significant portion of them. She still loses her balance posterior when transferring sit to stand from 17 inches. She still has ataxia and dysmetria affecting primarily left side extremities.    Her progress is very much affected by her ability to perform her home exercises correctly.    Patient is appropriate to continue with skilled physical therapy intervention, as indicated by initial plan of care.    Goal Status:  Pt. will show improved balance for safer : for household ambulation;for community ambulation;for other activities;in 12 weeks  Other Activity:: bus steps   Bus steps are hard , but is able to go up/down with use of rail on both sides      Plan / Patient Education:   Recheck patient in 3-4 weeks  Continue with initial plan of care.  Progress with home program as tolerated.    Subjective:     Pain Ratin  I lose my balance when I twist.    I have not had any falls since I was in the last time.      Objective:     Pt was observed to lose her balance as I walked past her- she was able to catch herself by reaching out to chair.    Pt tends to lose her balance: change of direction, change of speed, objects moving around her, and twisting.    Sit to stand from 17 inches: 4 reps in 30 seconds  SL stand: right 2 seconds, left is 0-1 second  6 minute walk test today: 280 ft, mostly carrying her SEC. Last test on 17 was 211 feet.  SL heel  raises: right 8 reps, left 0 reps    Patient walks with SEC but mostly carries the cane. She has ataxia and increased tone on the left leg and is unable to bend the left hip and knee to clear the floor adequately. She is very distractible when walking and often slows down to observe action around her. She has no LOB when making turns, as observed today.       Treatment Today     TREATMENT MINUTES COMMENTS   Evaluation     Self-care/ Home management     Manual therapy     Neuromuscular Re-education     Therapeutic Activity     Therapeutic Exercises 55 See flow sheet: reviewed sit to stand double and single heel raises, standing side leg raises, wall spin/corner spin,    Gait training     Modality__________________                Total 60    Blank areas are intentional and mean the treatment did not include these items.       Fior Lundberg  10/2/2017

## 2021-06-13 NOTE — PROGRESS NOTES
Clinic Care Coordination Contact  [facility]/Diomedes       Clinical Data: Care Coordinator Outreach  Outreach attempted x 1.  Left message on patient's spouse Christopher diomedes with call back information and requested return call.  Plan: Care Coordinator update and discuss goals  Care Coordinator will try to reach patient again in 10 business days.  12/23/2020

## 2021-06-13 NOTE — PROGRESS NOTES
Clinic Care Coordination Contact    Follow Up Progress Note      Assessment: [facility] RN spoke with patient's  Oscar today. Oscar reported home care has been out to see patient and will continue to come out for home visits for the next three weeks. Oscar denied any needs for patient currently. He state he continues to feel comfortable and capable or meeting her needs as her primary care giver, even with his own health issues. Encourage Oscar to contact [facility] if needs or concerns should arise. He agreed to do this.     Goals addressed this encounter:   Goals Addressed                 This Visit's Progress       Patient Stated      Medication 1 (pt-stated)        Goal Statement: I would like to speak with the pharmacist at the clinic to better understand Bing's medication in the next 30 days.  Date Goal set: 8/19/20  Barriers: cognition,complex medication regimen  Strengths: motivated  Date to Achieve By: 30 days  Patient expressed understanding of goal: yes  Action steps to achieve this goal:  1. I spoke with the pharmacist at [facility] to understand Bing's medications.  2. She is now taking liquid famotidine for acid reflux.  3. CHW will follow up with patient at next outreach and schedule MTM appointment if desired    Discussed and Updated 10/29/2020- CCC RN will send a note to MTM to discuss meeting with patient about medication concerns.   Discussed on 12/18/20 - Will discuss further with MTM.              Intervention/Education provided during outreach: Discussed the importance of patient taking her medications as directed. Encouraged him to assist patient with getting to her scheduled appointments. Oscar is aware of her upcoming Cardiology appointment. Discussed COVID-19 precautions.           Plan: [facility] RN will continue to monitor and will be available as nursing needs arise.     Care Coordinator will follow up in one month.

## 2021-06-13 NOTE — PROGRESS NOTES
Assessment and Plan:   Patient presents for annual wellness visit.     This visit was very technically challenging.  Patient was able to participate and was alert and interactive, but her partner appeared to misunderstand or not track the conversation well.  As he is her only caregiver and they have no children in the area and limited familial contacts, I am genuinely concerned about patient's welfare at home.  I worry about medication management in someone who did not seem to understand that I was going to be increasing the dose of one of her medications.  In the past, patient's medications have been mixed up and given erroneously.  Patient absolutely needs some additional care within the home and I have placed those referrals today.    1. Bradycardia  Unexpectedly, patient was bradycardic today.  She has had bradycardia down to the 40s during the hospitalization, but we have not seen that in the office before.  Patient is on no medication which would be likely to result in a bradycardia.  My concern is that patient might have periods where she has even lower pulses that have not yet been recorded.  Holter monitor ordered and referral to cardiology placed as she is a delicate patient.  - Holter Monitor; Future  - Ambulatory referral to Cardiology  - TSH    2. Benign Essential Hypertension  Well-controlled today, currently on Norvasc.  - Comprehensive Metabolic Panel    3. Stage 3 chronic kidney disease, unspecified whether stage 3a or 3b CKD  - Comprehensive Metabolic Panel    4. Cognitive impairment  Although patient has known cognitive impairment, and her  manages her medications, she was alert and oriented today.  I believe she was tracking our conversation better than her caretaker was.  - Ambulatory referral to Home Health    5. Hemiplegia and hemiparesis following cerebral infarction affecting left non-dominant side (H)  Patient's recent stroke with known hemiplegia has limited her ability to  ambulate, coupled with some deconditioning from residing at home.  Patient's mood is dependent on her ability to ambulate in the 1 activity she would like to resume once Covid has relaxed would be to go shopping.  It is also unclear to me why patient is not on a statin medication.  We will obtain a lipid panel today and will likely place patient on a statin once these results return.  - Ambulatory referral to PT/OT  - Lipid [place] RANDOM  - Ambulatory referral to Home Health    6. Oropharyngeal dysphagia  7. Gastrostomy present (H)  8. Malnutrition, unspecified type (H)  - Comprehensive Metabolic Panel  - Ambulatory referral to Home Health    9. Reactive depression  Patient's PHQ-9 score today was low, I suspect she is minimizing symptoms.  Patient endorsed significant anhedonia and a motivational tendencies.  I asked if she would like to increase her citalopram and she agreed.  - citalopram (CELEXA) 20 MG tablet; Take 1 tablet (20 mg total) by mouth daily.  Dispense: 90 tablet; Refill: 3    USPSTF Recommendations for age 70:  Patient has been counseled on/screened for:  - intimate partner violence and there are no concerns at this time  - a healthful diet and physical activity for CVD prevention  - Diabetes and hyperlipidemia: screening was performed.   - Hep C, ordered today  - Asprin use: patient chose to continue  Sexually transmitted infections: Patient would not like to be screened for chlamydia, gonorrhea, syphilis, HIV, and hepatitis  Colorectal Cancer: Colonoscopy last performed in 2013; cannot obtain records; will reorder in 2023  Immunizations: up to date   Cervical Cancer Screening: declined further screening  Mammogram: ordered today  Bone Density: Dexa scan ordered today  Fall risk assessment: Get up and go test not completed today as patient is obviously at a very high fall risk      The patient's current medical problems were reviewed.    I have had an Advance Directives discussion with the patient.  "    The following health maintenance schedule was reviewed with the patient and provided in printed form in the after visit summary:   Health Maintenance   Topic Date Due     DEPRESSION ACTION PLAN  1950     HEPATITIS C SCREENING  1950     DEXA SCAN  1950     MAMMOGRAM  04/03/2019     LIPID  06/16/2021     MEDICARE ANNUAL WELLNESS VISIT  12/10/2021     FALL RISK ASSESSMENT  12/10/2021     TD 18+ HE  02/07/2023     COLORECTAL CANCER SCREENING  04/04/2023     ADVANCE CARE PLANNING  12/10/2025     Pneumococcal Vaccine: 65+ Years  Completed     INFLUENZA VACCINE RULE BASED  Completed     ZOSTER VACCINES  Completed     Pneumococcal Vaccine: Pediatrics (0 to 5 Years) and At-Risk Patients (6 to 64 Years)  Aged Out     HEPATITIS B VACCINES  Aged Out        Subjective:   Chief Complaint: Bing Rodrigues is an 70 y.o. female here for an Annual Wellness visit.   HPI:    1) patient endorses 1 year of fecal incontinence.  She states that she performs all of her own hygiene is able to wipe her self effectively.  She does wear depends.  She desires no changes in this but wanted to make certain I was aware.  She would be amenable to placing barrier cream should she notice any redness or ulceration.     2) patient also wanted her chronic cough noted.  This has been addressed in the past.  Has not changed.  It is not presumed to be infectious.  However, patient has also been eating crackers.  They are \"very small crackers\".  She wonders if this is okay.    3) patient states her mood is excellent and has no concerns.  However, in speaking to the patient she states she does not do anything all day long.  She has no longer able to cook or leave the house.  She very much desires to be able to return to being able to shop.  She would also like to feel the climb stairs going to [employer].  Otherwise she has no motivation to do anything.  She was able to walk when she left her assisted living facility, but she no longer walks " easily.  When I asked the patient would like to increase her mood medication she agreed.    Review of Systems:  Please see above.  The rest of the review of systems are negative for all systems.    Patient Care Team:  Binta Wheeler MD as PCP - General (Family Medicine)  Binta Wheeler MD as Assigned PCP  Binta Lofton CHW as Community Health Worker (Primary Care - CC)  Mila Brunner PharmD as Pharmacist (Pharmacist)  Myhre, David J, RN as Lead Care Coordinator (Primary Care - CC)     Patient Active Problem List   Diagnosis     Urinary Incontinence     Benign Essential Hypertension     Chronic Kidney Disease, Stage 3     Osteopenia     Generalized Ischemic Cerebrovascular Disease With Cerebral Degeneration     Diaphragmatic hernia without obstruction or gangrene     Gastro-esophageal reflux disease with esophagitis     Oropharyngeal dysphagia     Lung nodule     Renal cyst     Malnutrition, unspecified type (H)     Hemiplegia and hemiparesis following cerebral infarction affecting left non-dominant side (H)     Cognitive impairment     Gastrostomy present (H)     Reactive depression     Past Medical History:   Diagnosis Date     Hypertension      Pneumonia      Stroke (H)       Past Surgical History:   Procedure Laterality Date     G TUBE REPLACEMENT  8/14/2019     G TUBE REPLACEMENT  11/13/2019     G TUBE REPLACEMENT  1/23/2020     G TUBE REPLACEMENT  4/29/2020     G TUBE REPLACEMENT  7/30/2020     G TUBE REPLACEMENT  10/26/2020     IR GJ TUBE REPLACEMENT  1/17/2019     IR GJ TUBE REPLACEMENT  4/18/2019     LA DILATION/CURETTAGE,DIAGNOSTIC      Description: Dilation And Curettage;  Recorded: 02/07/2013;      Family History   Problem Relation Age of Onset     Stroke Maternal Grandmother      Stroke Maternal Grandfather      Early death Paternal Grandmother      COPD Paternal Grandfather      Cancer Father         from smoking      Social History     Socioeconomic History     Marital status:       Spouse name: Not on file     Number of children: Not on file     Years of education: Not on file     Highest education level: Not on file   Occupational History     Not on file   Social Needs     Financial resource strain: Not on file     Food insecurity     Worry: Not on file     Inability: Not on file     Transportation needs     Medical: Not on file     Non-medical: Not on file   Tobacco Use     Smoking status: Never Smoker     Smokeless tobacco: Never Used   Substance and Sexual Activity     Alcohol use: No     Drug use: No     Sexual activity: Not Currently     Partners: Male   Lifestyle     Physical activity     Days per week: Not on file     Minutes per session: Not on file     Stress: Not on file   Relationships     Social connections     Talks on phone: Not on file     Gets together: Not on file     Attends Hoahaoism service: Not on file     Active member of club or organization: Not on file     Attends meetings of clubs or organizations: Not on file     Relationship status: Not on file     Intimate partner violence     Fear of current or ex partner: Not on file     Emotionally abused: Not on file     Physically abused: Not on file     Forced sexual activity: Not on file   Other Topics Concern     Not on file   Social History Narrative     Not on file      Current Outpatient Medications   Medication Sig Dispense Refill     acetaminophen (TYLENOL) 500 mg/15.62 mL solution 19.99 mL (640 mg total) by G-tube route every 4 (four) hours as needed for fever or pain.  0     amLODIPine (NORVASC) 5 MG tablet 1 tablet (5 mg total) by G-tube route daily. 90 tablet 2     aspirin 81 mg chewable tablet 1 tablet (81 mg total) by G-tube route daily.  0     calcium, as carbonate, (TUMS) 200 mg calcium (500 mg) chewable tablet 2 tablets (400 mg total) by G-tube route 4 (four) times a day as needed for heartburn.  0     cholecalciferol, vitamin D3, 1,000 unit tablet 1 tablet (1,000 Units total) by G-tube route  "daily.  0     famotidine (PEPCID) 40 mg/5 mL (8 mg/mL) oral suspension TAKE 2.5 ML (20 MG TOTAL) BY MOUTH 2 (TWO) TIMES A DAY. 150 mL 11     feeding container and pump set Misc Use 30 Bags As Directed every 30 (thirty) days. Randi Gutierrez Enteral Feeding Pump Set 1\" 1000 ml 30 bags per month 1 each 0     ferrous sulfate 325 (65 FE) MG tablet 1 tablet       guar gum (BENEFIBER;NUTRISOURCE) Pack powder packet 2 packets by Enteral Tube route 2 (two) times a day.  0     guar gum (NUTRISOURCE FIBER) Powd DISSOLVE 4 TEASPOONSFUL IN WATER AND GIVE PER G-TUBE TWICE DAILY 410 g 11     loperamide (IMODIUM) 1 mg/5 mL solution 10 mL (2 mg total) by G-tube route daily as needed for diarrhea.  0     melatonin 3 mg Tab tablet 1 tablet (3 mg total) by Enteral Tube route at bedtime as needed.  0     nut.tx.impaired digest fxn (PEPTAMEN 1.5) 0.068 gram- 1.5 kcal/mL Liqd Take 1 Bottle by mouth 4 (four) times a day. Peptamen 1.5 @@ 40 ml/hr for 22 hours then off for 2 hours.  Total of 520 CC over 24 hours 120 Bottle 30     nystatin (MYCOSTATIN) powder Apply to affected area 3 times daily 15 g 0     omeprazole (PRILOSEC) 2 mg/mL SusR suspension 10 mL (20 mg total) by Enteral Tube route daily before breakfast. 300 mL 3     omeprazole (PRILOSEC) 20 MG capsule PLEASE SEE ATTACHED FOR DETAILED DIRECTIONS 90 capsule 3     syringe, ENFit, non-sterile (MONOJECT ENFIT SYRINGE) 60 mL Syrg Use 1 Syringe As Directed as needed. 30 CC water bolus after before and after feedings 30 Syringe 11     syringe, ENFit, sterile (MONOJECT ENFIT SYRINGE) 12 mL Syrg Use 1 Syringe As Directed as needed. Monoject enteral syringe female 30 Syringe 11     citalopram (CELEXA) 20 MG tablet Take 1 tablet (20 mg total) by mouth daily. 90 tablet 3     No current facility-administered medications for this visit.       Objective:   Vital Signs:   Visit Vitals  /82   Pulse 61   Wt 127 lb (57.6 kg)   SpO2 96%   BMI 24.80 kg/m           VisionScreening:  No exam data " present     PHYSICAL EXAM  Gen: Alert, NAD, appears stated age, normal hygiene   Eyes: conjunctivae without injection, sclera clear, EOMI  ENT/mouth: nares clear, septum midline, absent rhinorrhea, throat without injection, neck is supple, no thyroid enlargement  CV: RRR, no murmur appreciated, pedal edema absent bilaterally  Resp: CTAB, no wheezes, rales or ronchi  ABD: normoactive, non-tender to palpation, nondistended  MSK: grossly full range of motion in all joints, no obvious deformity  Neuro: CN II-XII grossly intact, no deficits in coordination  Psych: no apparent hallucinations or delusions, no pressured speech; alert, oriented x3  SKIN: dry and without lesions  Heme/lymph: no pallor, no active bleeding/bruising, no adenopathy appreciated      Assessment Results 12/10/2020   Activities of Daily Living No help needed   Instrumental Activities of Daily Living 5-6 - Severe functional impairment   Mini Cog Total Score 5   Some recent data might be hidden     A Mini-Cog score of 0-2 suggests the possibility of dementia, score of 3-5 suggests no dementia    Identified Health Risks:     She is at risk for lack of exercise and has been provided with information to increase physical activity for the benefit of her well-being.  The patient was counseled and encouraged to consider modifying their diet and eating habits. She was provided with information on recommended healthy diet options.  The patient reports that she has difficulty with instrumental activities of daily living.  She was provided with a list of local organizations that provide support services and advised to make a follow up appointment in 12 weeks to address this further.   Information regarding advance directives (living [place]), including where she can download the appropriate form, was provided to the patient via the AVS.

## 2021-06-13 NOTE — TELEPHONE ENCOUNTER
Refill Approved    Rx renewed per Medication Renewal Policy. Medication was last renewed on 07/31/2020 for 1 years worth. New pharmacy.  Last office visit was 09/01/2020 with PCP.    Graciela Daniel, [facility] Triage/Med Refill 11/20/2020     Requested Prescriptions   Pending Prescriptions Disp Refills     citalopram (CELEXA) 10 MG tablet [Pharmacy Med Name: CITALOPRAM HBR 10 MG TABLET] 7 tablet 0     Sig: TAKE 1 TABLET BY G-TUBE ROUTE DAILY.       [facility] Refill Protocol  Passed - 11/20/2020  4:29 PM        Passed - PCP or prescribing provider visit in last year     Last office visit with prescriber/PCP: Visit date not found OR same dept: 9/1/2020 Binta Wheeler MD OR same specialty: 9/1/2020 Binta Wheeler MD  Last physical: Visit date not found Last MTM visit: Visit date not found   Next visit within 3 mo: Visit date not found  Next physical within 3 mo: Visit date not found  Prescriber OR PCP: Shalonda Lindsay MD  Last diagnosis associated with med order: 1. Mood Disorder Of Unknown (Axis III) Etiology  - citalopram (CELEXA) 10 MG tablet [Pharmacy Med Name: CITALOPRAM HBR 10 MG TABLET]; TAKE 1 TABLET BY G-TUBE ROUTE DAILY.  Dispense: 7 tablet; Refill: 0    If protocol passes may refill for 12 months if within 3 months of last provider visit (or a total of 15 months).

## 2021-06-13 NOTE — PROGRESS NOTES
[facility] Daily Progress /Monthly Summary/Discharge Summary    Patient Name: Bing Rodrigues  Date: 2017  Visit #: 10/12 + 12  Referral Diagnosis: Left sided weakness, Abnormality of Gait  Referring provider: Isabel Barrios MD  Visit Diagnosis:     ICD-10-CM    1. Generalized muscle weakness M62.81    2. Ataxic Gait R26.9    3. History of CVA (cerebrovascular accident) Z86.73    4. Benign Essential Hypertension I10    5. Chronic Kidney Disease, Stage 3 N18.3          Assessment:     Pt is a 67 year old female post right CVA with residual left sided weakness and gait ataxia, who has been coming to PT for treatment since 3-13-17 to 17 for a total of 22 visits. She caban made steady improvements and now is ready to discontinue to a home program of gait, balance and strengthening activities.    Goal Status:  Pt. will show improved balance for safer : for household ambulation;for community ambulation;for other activities;in 12 weeks  Other Activity:: bus steps     The bus steps are easier if I can hang on with both handrails  The 6 minute walk test has shown steady improvement in the last 4 months, but patient is still not a functional community walker due to extremely slow speed after 2 minutes, and decreased endurance.      Plan / Patient Education:     Patient has met many of her gait and balance goals, and the last goal of improved ability to go up and down bus steps is improving if not met completely. She is in agreement that she is ready to discontinue to a home exercise program, and the home program is very complete (seee flow chart). Patient has been compliant in her program and was told if she needs to return she would need to have another physician order for PT. A long term goal that patient will work on is sit to stand test of 13 reps, and a 6 minute walk test of 800 feet.    Subjective:     Pain Ratin   No falls since I last saw you!  I have been moving items around in my kitchen  so I can work on my balance.      Objective:     Patient reports she has is unable to initially put weight on the left leg in the morning due to weakness, and this improves after 5 minutes.  She reports both feet feel numb and cold primarily at night.    6 Minute walk test today: 375.2 feet, no SEC ( was Sept 280 ft October was 330.6 ft). Patient was able to walk with good foot clearance and good gait speed up to 2 minutes, then gaot sped and step length declined, with multiple episodes of poor left foot clearance during swing through.  Sit to Stand test:  4 reps in 30 seconds from 17 inches. Normal/Low falls risk is 13 or more reps. Patient still losing balance backwards which changes test score  SL Stand: Left 0 sec , right 1 sec ( with fingertip touch, left 13 sec, right 28 sec)  SL heel raise: left 3 reps, right 8 reps      Treatment Today     TREATMENT MINUTES COMMENTS   Evaluation     Self-care/ Home management     Manual therapy     Neuromuscular Re-education     Therapeutic Activity     Therapeutic Exercises 60 Performed exercises a above and emphasized balance during sit to stand. It was noted that patient was much better ab;le to lower deeper without falling backwards into her chair. Verbal review of other exercises on flow sheet   Gait training     Modality__________________                Total 60    Blank areas are intentional and mean the treatment did not include these items.       Fior Lundberg  11/6/2017

## 2021-06-13 NOTE — PROGRESS NOTES
Clinic Care Coordination Contact    Follow Up Progress Note      Assessment: CCC RN spoke with patient's , Oscar today about current goals. He stated he was not interested in having her medications transferred to [facility], but would like them to continue to go to [facility]. He he did not want to have to wait a week for her medications to be delivered if there were changes. He stated he has a friend who can assist him with picking up her medications at [facility]. He stated she currently has a good supply of her scheduled medications at home. Oscar stated he continues to feel comfortable administering her medications and was able to name the medications she is taking currently during phone visit today. Oscar stated he would appreciate a call from the [facility] for additional medication education. CCC RN will forward note to [facility].   No other concerns expressed.   Goals addressed this encounter:   Goals        Patient Stated      Medication 1 (pt-stated)      Goal Statement: I would like to speak with the pharmacist at the clinic to better understand Bing's medication in the next 30 days.  Date Goal set: 8/19/20  Barriers: cognition,complex medication regimen  Strengths: motivated  Date to Achieve By: 30 days  Patient expressed understanding of goal: yes  Action steps to achieve this goal:  1. I spoke with the pharmacist at [facility] to understand Bing's medications.  2. She is now taking liquid famotidine for acid reflux.  3. CHW will follow up with patient at next outreach and schedule MTM appointment if desired    Discussed and Updated 10/29/2020- CCC RN will send a note to MT to discuss meeting with patient about medication concerns.            Intervention/Education provided during outreach: Discussed the importance of patient taking her medications daily as directed.           Plan: CCC RN will continue to monitor, support patient with current goal and will be available as nursing needs arise.     Care Coordinator  will follow up in one month.

## 2021-06-13 NOTE — PROGRESS NOTES
[facility] Daily Progress     Patient Name: Bing Rodirgues  Date: 10/13/2017  Visit #:   Referral Diagnosis:Left sided weakness, Abnormality of Gait  Referring provider: Isabel Barrios MD  Visit Diagnosis:     ICD-10-CM    1. Generalized muscle weakness M62.81    2. Ataxic Gait R26.9    3. History of CVA (cerebrovascular accident) Z86.73    4. Benign Essential Hypertension I10    5. Chronic Kidney Disease, Stage 3 N18.3          Assessment:   Patient is slower improving with balance and endurance for gait. She does have some left LE ataxia which does affect balance and may affect her ability to drive.  Patient demonstrates understanding/independence with home program.  Patient is benefitting from skilled physical therapy and is making steady progress toward functional goals.    Goal Status:  Pt. will show improved balance for safer : for household ambulation;for community ambulation;for other activities;in 12 weeks  Other Activity:: bus steps      Plan / Patient Education:     Continue with initial plan of care.  Progress with home program as tolerated.    Subjective:     Pain Ratin    I feel like I am taking longer steps.  I couldn't do the corner roll and I don't have a corner to do it in- I do have a 6 foot section of wall but it is still hard to do and I only did it once.    I would like to drive again.      Objective:     6 minute walk test:  330.6 feet- no cane , 3 weeks ago 280 feet.  SL Stand: right 10 reps, left 3 reps  Sit to stand test: 3 reps in 30 seconds from 17 inches ( a decrease from 4 reps). Pt losing balance backwards despite cuing.    While doing ball toss with a light ball, patient did not lose her balance and had excellent eye hand coordination.      Treatment Today     TREATMENT MINUTES COMMENTS   Evaluation     Self-care/ Home management     Manual therapy     Neuromuscular Re-education     Therapeutic Activity     Therapeutic Exercises 60 Exercises per flow  sheet, and we did talk about unloading left hip when left side lying with a pillow (pt stated if she lays on her left side she has a hard time walking in the morning as the left leg is numb)   Gait training     Modality__________________                Total 60    Blank areas are intentional and mean the treatment did not include these items.       Fior Lundberg  10/13/2017

## 2021-06-13 NOTE — TELEPHONE ENCOUNTER
MED REC    Patient does not have prilosec at home though it's on her medications list      Please respond to clinical coordinator with any changes or instructions.    HOME CARE ORDERS    Requesting verbal okay for follow-up home PT visits 2w3 to address gait, transfers, balance/coordination, and HEP.    Also requesting medical social worker visit for community resources.    Please respond to this encounter. Thanks!

## 2021-06-14 NOTE — PROGRESS NOTES
Clinic Care Coordination Contact    Follow Up Progress Note      Assessment: Spoke with patient , Ramon today briefly. He stated patient was getting ready to go to an appointment at. [facility] to get her G-tube replaced and was unable to come to the phone. Ramon stated home care ended yesterday for patient. Ramon said on  from [facility] came out to their home to meet with them today and made some recommendations. He was unable to discuss any further as they were heading out the door soon. Ramon continues to be the primary care giver for his wife and said he continues to feel comfortable in this role. He agreed to follow call in a month from the CCC RN. No concerns expressed.     Goals addressed this encounter:   Goals Addressed                 This Visit's Progress       Patient Stated      Medication 1 (pt-stated)        Goal Statement: I would like to speak with the pharmacist at the clinic to better understand Bing's medication in the next 30 days.  Date Goal set: 8/19/20  Barriers: cognition,complex medication regimen  Strengths: motivated  Date to Achieve By: 30 days  Patient expressed understanding of goal: yes  Action steps to achieve this goal:  1. I spoke with the pharmacist at [facility] to understand Jenines medications.  2. She is now taking liquid famotidine for acid reflux.  3. CHW will follow up with patient at next outreach and schedule MTM appointment if desired    Discussed and Updated 10/29/2020- CCC RN will send a note to MTM to discuss meeting with patient about medication concerns.   Discussed on 12/18/20 - Will discuss further with MTM.   Unable to discuss on 1/26/21             Intervention/Education provided during outreach: Discussed the importance of patient taking his medications as directed.         Plan: CCC RN will continue to monitor, support patient with current goals and will be available to assist as nursing needs arise.     Care Coordinator will follow up in  one month.

## 2021-06-14 NOTE — PROGRESS NOTES
CHW reviewed last CC note from CC RN on 12/18/2020- Goals were addressed and updated  Plan: CCC RN will continue to monitor and will be available as nursing needs arise.      Care Coordinator will follow up in one month.    Next CHW outreach= 1/18/2021    Goals        Patient Stated      Medication 1 (pt-stated)      Goal Statement: I would like to speak with the pharmacist at the clinic to better understand Bing's medication in the next 30 days.  Date Goal set: 8/19/20  Barriers: cognition,complex medication regimen  Strengths: motivated  Date to Achieve By: 30 days  Patient expressed understanding of goal: yes  Action steps to achieve this goal:  1. I spoke with the pharmacist at [facility] to understand Bing's medications.  2. She is now taking liquid famotidine for acid reflux.  3. CHW will follow up with patient at next outreach and schedule MTM appointment if desired    Discussed and Updated 10/29/2020- CCC RN will send a note to MTM to discuss meeting with patient about medication concerns.   Discussed on 12/18/20 - Will discuss further with MTM.

## 2021-06-14 NOTE — PATIENT INSTRUCTIONS - HE
1. As long as you are not getting lightheaded or dizzy, it is okay for your heart rate to be slow at times.  2. No further testing or change in medication needed at this time.

## 2021-06-14 NOTE — PROGRESS NOTES
Clinic Care Coordination Contact  [facility]/Voicemail       Clinical Data: Care Coordinator Outreach  Outreach attempted x 1.  Left message on patient's voicemail with call back information and requested return call.  Plan: Care Coordinator update goal progression Care Coordinator will try to reach patient again in 10 business days.  2/4/2021

## 2021-06-14 NOTE — TELEPHONE ENCOUNTER
Social Work is requesting another order for a home visit to go over resources provided.    1  order for to day visit set for 10 am    Sorry for the late request I thought I had another order.      Thank You!    Makeda CAZARES

## 2021-06-14 NOTE — TELEPHONE ENCOUNTER
Spoke with Jose who states patient needs a renewal of her formula. Per Jose supplies for her Gtube were already renewed.     I confirmed the directions for her peptamen 1.5 and pended an order for this under the communications tab.    Please review, sign, and date this order if appropriate.  Order should be faxed back to [facility]   Fax number: [phone]

## 2021-06-14 NOTE — TELEPHONE ENCOUNTER
HOME CARE ORDERS    Requesting verbal okay for new home PT orders 1w1, 2w2, 1w1 for gait, transfers, balance, and coordination. Patient recently had payor change and thus required new home care assessment.    MED REC    Patient does not have omeprazole at home though it is on her medications list.     Please respond to this telephone encounter. Thanks.

## 2021-06-14 NOTE — TELEPHONE ENCOUNTER
Reason contacted:  Orders Request  Information relayed:    Received a phone call from patients  Oscar requesting an order for patients Gtube supplies.    He states patient receives Gtube supplies from [facility]  Phone number to [facility] is [phone].      In[facility] Oscar [facility] to determine if an order could be sent for Dr. Wheeler to sign.   Left a message for Jose with [employer] to return my phone call to discuss this further.    Additional questions:  Yes  Further follow-up needed:  Yes  Okay to leave a detailed message:  Yes

## 2021-06-15 NOTE — TELEPHONE ENCOUNTER
Hello,     Will someone be able to fax the info to Mira at [facility] today to see if they can accept this patient?    Thanks,   Dave Myhre, RN  CCC RN

## 2021-06-15 NOTE — PROGRESS NOTES
Assessment & Plan      This was a very technically challenging appointment as both patient and partner intermittently refuse to respond to me.  There is a known element of cognitive dysfunction for both patients and medication compliance has been an issue secondary to innocent but repeated instances of poor medication management.  In 1 notable instance, a topical powder was administered via G-tube.      Reactive depression  It is unclear if there has been any benefit from the increase in citalopram as it is unlikely that patient is actually receiving this medication, or at least the correct dose.  Current med list printed for patient in an effort to help patient increase the citalopram and may be improved mood.    Hemiplegia and hemiparesis following cerebral infarction affecting left non-dominant side (H)  Oropharyngeal dysphagia  Malnutrition, unspecified type (H)  Cognitive impairment  Gastrostomy present (H)  I strongly believe the patient needs home health services for medication management in order to preserve her safety and for any hope of improvement in mood.  With strongly worded recommendations, patient's  was ultimately willing to allow skilled nursing into his home to help with med management.  Patient's main desires to be able to eat.  We discussed that this would likely kill her secondary to aspiration pneumonia but referral placed to speech therapy regardless as there may be some benefit for improvement in speech.  - Amb referral to Adult Speech Therapy- Internal  - Ambulatory referral to Home Health    HTN:  Blood pressure was elevated today, repeat pressures demonstrated small improvement; no changes in meds today but low threshold after known medication compliance to increase the amlodipine.     BMI:   Estimated body mass index is 26.37 kg/m  as calculated from the following:    Height as of 12/29/20: 5' (1.524 m).    Weight as of this encounter: 135 lb (61.2 kg).     No follow-ups on  file.    Binta Wheeler MD  [facility]    Jamia Rodrigues is 71 y.o. and presents today for the following health issues: follow up on test results.   HPI   Patient's last appointment, her physical, we discussed several concerns:  -She had unexpected bradycardia for which a Holter monitor was ordered, she was also referred to cardiology  -Cognitive impairment: More concerns with respect to 's ability to care for patient, referral was placed to home health for medication management  -Patient's main goal is to be able to go back shopping, referral placed to PT/OT  -Did not place patient on a statin secondary to very low cholesterol  -Patient has anhedonia and reactive depression, increased Celexa to 20 mg    Insurance declined further physical therapy. She feels slightly stronger, like it helped a little bit.  She wished to could have lasted longer.    When we discussed whether not patient felt any better on the increased dose of Celexa, patient was unable to tell.  When queried, patient's  had never heard of the word citalopram or Celexa.  When I wrote it down he stated that she was taking it but she was only taking half a tablet.  Given that he did not know the name of the medication, I struggle to believe that she is taking consistent dosing as prescribed.  Medication compliance has been an issue in the past; topical medications have been administered via G-tube.  Both patient and patient's caregiver seem to have some element of cognitive impairment.    Patient wants to be able to eat.  She wants to be able to speak normally.    Review of Systems  none      Objective    /90   Pulse (!) 56   Wt 135 lb (61.2 kg)   SpO2 97%   BMI 26.37 kg/m    Body mass index is 26.37 kg/m .  Physical Exam  Gen: Alert but only intermittently responsive  Psych: Appears listless  Neuro: Obvious dysphagia with dysphonia

## 2021-06-15 NOTE — TELEPHONE ENCOUNTER
Hello,     I spoke with Mira at [facility]. She said she did not receive a copy of the order. She also wondered if this patient needed PT/OT along with Speech. They are able to provide this too. Please update order if needed and fax to Mira at [phone].    Thanks for your help,     Zak

## 2021-06-15 NOTE — TELEPHONE ENCOUNTER
Reason contacted:  Status update  Information relayed:    Mira calling from [facility] to inform Dr. Wheeler a start of care was completed today and the patient will be opened to home care for skilled nursing and speech therapy.   Phone number for Mira: [phone]  Additional questions:  No  Further follow-up needed:  No  Okay to leave a detailed message:  No

## 2021-06-15 NOTE — TELEPHONE ENCOUNTER
Comfort Wheeler,     I spoke with Mira at [facility]. The did not receive a copy of  the order for home care yet. They will accept the patient however. Mira would like the order to state that patient needs PT and OT additionally. Would you able to add this to the order? Please fax the order to Mira with updates to [phone]. I am working at home today. You can reach out to me at [phone] at anytime for questions.     Thanks,     Dave Myhre, RN   CCC RN

## 2021-06-15 NOTE — TELEPHONE ENCOUNTER
So sorry to send another message.     I just got a call from Mira at [facility]. She does not want PT and OT added to the order. She said she misread the information that was previously faxed to her. I will be gladly to fax the original order tomorrow to her when I am at the [facility]. So sorry for all of this.     Thanks,   Dave Myhre, RN   CCC RN

## 2021-06-15 NOTE — PROGRESS NOTES
Clinic Care Coordination Contact  Due Date: Within 2 weeks from today's date, 3/10/2021  Delegation: No Show for RN appointment. Please follow unsuccessful outreach, no show standard work.

## 2021-06-15 NOTE — TELEPHONE ENCOUNTER
Comfort Wheeler,     I spoke with Mira at [facility] this morning. She thought they might be able to accept this patient in their home care program. Could someone at the Clinic please fax your last Office Visit note, her face sheet, medications list and the home care order to Mira [facility]. Her fax number is   [phone]. Mira has my number and will let me know if they can take her. I will keep you in the loop.     Thanks,     Dave Myhre, RN  CCC RN

## 2021-06-15 NOTE — TELEPHONE ENCOUNTER
Hello,    Recently an order was placed with [facility]  to provide in home nursing for this patient.    Unfortunately at this time we are unable to accept this referral due to our capacity. We are also unable to vend this patient to another agency due to their limited capacity as well.    We have sent this patient information to our Clinic Care Transition team to see what they can do to assist with the situation.    We are so sorry for the inconvenience this causes. If you have any questions, please call our office at [phone].    Thank you,  Opal

## 2021-06-15 NOTE — TELEPHONE ENCOUNTER
Comfort Sabillon,     I received a call from Mira, home care RN with [facility]. She said they were out to see patient today, but he discovered an issue with patient's insurance. Apparently patient and her  have a $7900 deductible [facility] Medicare Advantage. Mira said they would have to pay 50% towards their home care in order for them to continue. Ramon said they are not in a financial place at this time to afford this. The home care RN said she is going to meet with them on Monday and get on the phone with them and their  insurance company and see if they can find another home care agency. She said Ramon made remarks during their conversation that he is worried about being able to care for patient, especially is his health declined. Mira wondered if you discussed LTC placement for patient and Ramon. I assured her this has been discussed with patient and her . You can contact me at [phone] if needed.     Thanks,     Dave Myhre, RN  CCC RN

## 2021-06-15 NOTE — PROGRESS NOTES
Clinic Care Coordination Contact    Community Health Worker Follow Up    Goals:   Goals Addressed                 This Visit's Progress       Patient Stated      Medication 1 (pt-stated)   40%     Goal Statement: I would like to speak with the pharmacist at the clinic to better understand Bing's medication in the next 30 days.  Date Goal set: 8/19/20  Barriers: cognition,complex medication regimen  Strengths: motivated  Date to Achieve By: 30 days  Patient expressed understanding of goal: yes  Action steps to achieve this goal:  1. I spoke with the pharmacist at [facility] to understand Bing's medications.  2. She is now taking liquid famotidine for acid reflux.  3. CHW will follow up with patient at next outreach and schedule MTM appointment if desired  4. My  and I will attend follow up CC RN appt on 3/9/21 @ 9am      Discussed and Updated 10/29/2020- CCC RN will send a note to MTM to discuss meeting with patient about medication concerns.   Discussed on 12/18/20 - Will discuss further with MTM.   Unable to discuss on 1/26/21  Discussed 3/4/21            CHW Next Follow-up: 4/5/2021    CHW Plan: To continue with monthly outreach calls to update and discuss goal progression.  CHW discussed with patients  and discussed patients and spouses confusion regarding insurance coverage for medication set up visits.  CHW scheduled CC RN follow up with patient/patients spouse Oscar for 3/9 @9am

## 2021-06-16 PROBLEM — F32.9 REACTIVE DEPRESSION: Status: ACTIVE | Noted: 2020-06-30

## 2021-06-16 PROBLEM — K21.00 GASTRO-ESOPHAGEAL REFLUX DISEASE WITH ESOPHAGITIS: Status: ACTIVE | Noted: 2017-04-07

## 2021-06-16 PROBLEM — R13.12 OROPHARYNGEAL DYSPHAGIA: Status: ACTIVE | Noted: 2018-06-15

## 2021-06-16 PROBLEM — K44.9 DIAPHRAGMATIC HERNIA WITHOUT OBSTRUCTION OR GANGRENE: Status: ACTIVE | Noted: 2017-04-07

## 2021-06-16 PROBLEM — I69.354 HEMIPLEGIA AND HEMIPARESIS FOLLOWING CEREBRAL INFARCTION AFFECTING LEFT NON-DOMINANT SIDE (H): Status: ACTIVE | Noted: 2020-03-12

## 2021-06-16 PROBLEM — R91.1 LUNG NODULE: Status: ACTIVE | Noted: 2018-08-25

## 2021-06-16 PROBLEM — N28.1 RENAL CYST: Status: ACTIVE | Noted: 2018-08-25

## 2021-06-16 NOTE — TELEPHONE ENCOUNTER
Dr. Wheeler I pended the same order from 2/23/2021 but added PT for strengthening and gait training and OT for ADL's and  Home safety       I hope this is what you were looking for?

## 2021-06-16 NOTE — TELEPHONE ENCOUNTER
Reason contacted:  Orders request  Information relayed:    Tamika calling from [facility]   Phone number: [phone]    Order being requested:   Social work - 1 additional visit to discuss community resources and discharged planning.     Notified ok for requested order per Dr. Wheeler  Please advise if this is not ok.   Additional questions:  No  Further follow-up needed:  No  Okay to leave a detailed message:  No

## 2021-06-16 NOTE — TELEPHONE ENCOUNTER
Patients  is calling to request an order for patient to have a G Tube replacement done on 4/26/2021 at [facility].    Order pended for approval.      Please notify patients  once this order is placed so an appointment can be arranged.

## 2021-06-16 NOTE — PROGRESS NOTES
Clinic Care Coordination Contact    Follow Up Progress Note      Assessment: [facility] RN spoke with patient's  Ramon today to follow up on goals and to discuss any needs or concerns. Patient was unavailable to talk to writer as she was working with her home care physical therapist. Ramon said today would be her last day of PT and said it was also be her last day with home care RN services. He stated he was not sure if home care would be extended at this time. Ramon reported patient was doing pretty well. He continues to assist her with her needs including medications administration. Patient has an MEV scheduled with writer to go over patient's medications at the [facility] on Monday, 4/26/21 per his request. Ramon stated he and his wife are going to be moving to an Assisted Living facility in the near future. He stated the home care  has been helping with resources for an Assist Living facility. He stated there is no move-in date scheduled at this time. Ramon reported that patient did receive her first COVID vaccine and is scheduled for her send. No other concerns a this time.       Goals addressed this encounter:   Goals Addressed                 This Visit's Progress       Patient Stated      Functional (pt-stated)   70%     Goal Statement: Patient and her  Oscar stated: I would like to have home care, specifically an RN to assist me with my medications, a speech therapist, physical therapist, and occupational therapy in the next 30 days.   Date Goal set: 3/23/21  Barriers: Needing assistance with care at home.   Strengths: Support , family and friends.   Date to Achieve By: 4/23/21  Patient expressed understanding of goal: Yes  Action steps to achieve this goal:  1. I understand the the [facility] RN request a referral from my PCP for home care, RN, PT, Speech and OT.  2. I understand home care should be contacted us directly.   3. I work with the home care staff to ensure my needs and safety are  being addressed so I can live successfully at home.   4. I will report progress towards this goal at scheduled outreach telephone calls from my CCC team.     Discussed 4/19/21  Discussed on 4/22/21        Psychosocial (pt-stated)        Goal Statement:  I would like to complete an Advance Care Directive in the next 6 months.   Date Goal set: 3/23/21  Barriers: None  Strengths: Strong support from  and family.   Date to Achieve By: 9/23/21  Patient expressed understanding of goal: yes  Action steps to achieve this goal:  1. I understand the CCC RN will mail a copy of the Advance Care Directive directly to me.  2. I will complete the Advance Care Directive and forward a copy to to my Clinic.   3. I will report progress towards this goal at scheduled outreach telephone calls from the CCC team.     Not discussed 4/19/21  Discussed on 4/22/21             Intervention/Education provided during outreach: Discussed the importance of patient receiving her medications as directed. Encouraged Ramon to attend scheduled MEV with writer on Monday. Discussed COVID precautions.           Plan: CCC RN will continue to monitor, support patient with current goals and will be available to assist as nursing needs arise.   Care Coordinator will follow up on 4/26/21 with patient's  at the [facility].

## 2021-06-16 NOTE — TELEPHONE ENCOUNTER
Hi Dr Wheeler are you okay with orders?    Also do you want her to take famotidine and omeprazole?     I will call back for you. Thank youyin

## 2021-06-16 NOTE — TELEPHONE ENCOUNTER
Requesting verbal orders for home PT 2w3 to address gait, transfers, balance, and HEP.    Please respond to this telephone encounter.    Thanks!

## 2021-06-16 NOTE — TELEPHONE ENCOUNTER
Hello,     Thank you for the referral for this patient. At this time, we are experiencing staffing capacity issues and will not be able to start services until 3/27/21. Will this be okay?    Also, order sent via fax is the same order we declined in Feb. We will need a new order reflecting services needed and to start home care on or before 3/27/21.    If you have any questions, please call us at [phone] or reply to this message.     Thank you,   Opal  [facility]

## 2021-06-16 NOTE — PROGRESS NOTES
"Clinic Care Coordination Contact    Follow Up Progress Note:    Assessment: Spoke with patient's . Oscar today to follow up on any need or concerns and to re-schedule for RN assessment.  Patient was also present during the conversation, but did not talk to writer. She was rescheduled for an RN assessment on 3/23/21 at 9:30 a.m. Oscar said that her home care with [facility] ended related to the cost. He said he would have had to pay $9.000 out of pocket for the home care related [facility] not being in network. He stated has a follow up with patient's PCP today to discuss her home care needs. Oscar stated he has been assisting patient with her medications and her cares. He stated, \"Things are going pretty good\". No other concerns at this time.        Goals addressed this encounter:   Goals Addressed    None          Intervention/Education provided during outreach: Discussed the importance of patient taking her medications as directed. Encouraged him to assist patient with attending all scheduled follow up appointments. Discussed COVID precautions.           Plan: CCC RN will continue to monitor, speak with patient and her  on 3/23/21 for RN assessment and will be available to assist as additional nursing needs arise.     Care Coordinator will follow up on 3/23/21    "

## 2021-06-16 NOTE — TELEPHONE ENCOUNTER
Will use same referral Dr. Wheeler ordered per provider and will fax to [facility] today at: [phone] PH: [phone].

## 2021-06-16 NOTE — TELEPHONE ENCOUNTER
Comfort Wheeler,     I spoke with Nimisha and Oscar today for [facility] re-assessment. Oscar said he would like home care for patient, RN for complex medication management, Speech Therapy, PT and OT. They were set up with [facility], but stopped because they were out of network for their insurance. It would have been nice if [facility] had checked this prior to accepting patient.  If you agree with this request, could you send a home care referral to [facility]. They were not taking any patients when you last sent your home care order on 2/23/21. Please advise.     Thanks,      Dave Myhre, RN  CCC RN

## 2021-06-16 NOTE — PROGRESS NOTES
Clinic Care Coordination Contact    Community Health Worker Follow Up    Goals:   Goals Addressed                 This Visit's Progress       General      Functional (pt-stated)   30%     Goal Statement: Patient and her  Oscar stated: I would like to have home care, specifically an RN to assist me with my medications, a speech therapist, physical therapist, and occupational therapy in the next 30 days.   Date Goal set: 3/23/21  Barriers: Needing assistance with care at home.   Strengths: Support , family and friends.   Date to Achieve By: 4/23/21  Patient expressed understanding of goal: Yes  Action steps to achieve this goal:  1. I understand the the [facility] RN request a referral from my PCP for home care, RN, PT, Speech and OT.  2. I understand home care should be contacted us directly.   3. I work with the home care staff to ensure my needs and safety are being addressed so I can live successfully at home.   4. I will report progress towards this goal at scheduled outreach telephone calls from my CCC team.     Discussed 4/19/21        Psychosocial (pt-stated)   10%     Goal Statement:  I would like to complete an Advance Care Directive in the next 6 months.   Date Goal set: 3/23/21  Barriers: None  Strengths: Strong support from  and family.   Date to Achieve By: 9/23/21  Patient expressed understanding of goal: yes  Action steps to achieve this goal:  1. I understand the [facility] RN will mail a copy of the Advance Care Directive directly to me.  2. I will complete the Advance Care Directive and forward a copy to to my Clinic.   3. I will report progress towards this goal at scheduled outreach telephone calls from the CCC team.     Not discussed 4/19/21            CHW Next Follow-up: 1 month    CHW Plan: CHW will continue to support patient with goals through routine scheduled outreach.     Discussion: CHW spoke with patient's spouse regarding her home care goal. He states that she is now only  "getting OT and PT and the nurse is not coming out any longer. CHW asked spouse if her was comfortable setting up patient's medications and his response was that \"I guess I need to be\". CHW scheduled patient and spouse for a medication set up visit with CCC RN on 4/26/21     Next outreach due:  5/10/21  "

## 2021-06-16 NOTE — TELEPHONE ENCOUNTER
----- Message from Miryam Martinez CMA sent at 3/19/2021  3:22 PM CDT -----  Lg Zaragoza, could you please put together a med list for pt? I am not in next week. I appreciate it!  ----- Message -----  From: Binta Wheeler MD  Sent: 3/19/2021   2:22 PM CDT  To: Miryam Martinez CMA    Please print and mail a med list for patient's . It would be SUPER helpful if you wanted to maybe highlight which ones go in the morning or which are as needed... any way you think you could color coordinate it to make it easier for him.

## 2021-06-16 NOTE — TELEPHONE ENCOUNTER
What is wrong with the order placed on 2/23/2021?     Can a new order be placed for home care with the same information in the order that was previously placed on 2/23/2021?

## 2021-06-16 NOTE — TELEPHONE ENCOUNTER
Spouse states yesterday patient kept pulling on a locked door to open it/didn't realize it was locked.  Spouse very concerned about incident and asking if provider feels it is a concern.    Divya Boateng RN  [facility] Triage Nurse Advisor    Please close encounter when completed.

## 2021-06-16 NOTE — TELEPHONE ENCOUNTER
The issue is that the order from 21 is the same order we had declined when it was originally created. Therefore, it is , especially because it has been a month since it was written.     Another reason why we need a new order was to delay cares until 3/27/21 or sooner. However, our availability has changed again and we will not be able to see patient until 3/30 or sooner.     If you have any other questions, please do not hesitate to reach out to us!    Thank you,   Opal

## 2021-06-16 NOTE — TELEPHONE ENCOUNTER
"Ramon calling about a copy of med list he received in the mail. 2 of the meds directions have \"by mouth\" in them. Pt has feeding tube. He was instructed to disregard \"by mouth\" instructions.  "

## 2021-06-16 NOTE — TELEPHONE ENCOUNTER
I reached out to  Oscar- he states that usually Sunday morning they would head out to [place]. Pt went downstairs and went to the door to open it. The door was locked. Pt kept trying to open the door. She usually unlocks the door but did not this time. Reji had to unlock the door for pt in order to open it.  Reji states no changes in anything, no fall, and no injuries to pt.

## 2021-06-16 NOTE — PROGRESS NOTES
Patient's  wanted to confirm the medications patient is taking, but did not wish to go over them.  He only wished to have a list mailed to his house.  We also briefly discussed the citalopram dosing, I reiterated the 20 mg and the very reasonable option for this patient.  He was amenable to continuing the dose.    Given that this was merely a confirmation, I do not feel that this is worthy of visit, and made it no charge.

## 2021-06-16 NOTE — TELEPHONE ENCOUNTER
I made a med list! Will mail to patient today. I was not sure what time exactly he was taking amlodipine, aspirin, Vitamin D, citalopram therefore I put in the morning. It looks like omeprazole is on file twice -- he is doing the suspension I believe?     If there are issues with the med list, please have patient let me know and I can update it and resend.

## 2021-06-16 NOTE — PROGRESS NOTES
Clinic Care Coordination Contact  [facility]/Voicemail       Clinical Data: Care Coordinator Outreach  Outreach attempted x 1.  Left message on patient's voicemail with call back information and requested return call.  Plan:  Care Coordinator will try to reach patient again in 3-5 business days.

## 2021-06-16 NOTE — TELEPHONE ENCOUNTER
Reason for Call:  Other medication questions     Detailed comments: spouse Oscar calling with questions about dosing of Vit D and the fomotidine    Phone Number Patient can be reached at: Home number on file [phone] (home)    Best Time: anytime    Can we leave a detailed message on this number?: Yes    Call taken on 4/8/2021 at 12:14 PM by Joss Schofield

## 2021-06-16 NOTE — TELEPHONE ENCOUNTER
Need referral to home care to [facility] since last order was in 02/23/2021- if you agree pls place order per Zak SILVEIRA

## 2021-06-16 NOTE — TELEPHONE ENCOUNTER
Hello,    Patient was seen today by [facility] to start services.  Can we please have orders for SN 2 times a week for 2 weeks then 1 time a week for 7 weeks and 3 PRNS for medication management, education and safety.  Also need orders for PT/OT/ST to eval and MSW.      Reviewed patient's medication list with caregiver and it was noted that she is prescribed Famotidine and omeprazole.  Patient has famotidine in the home but does not have the omeprazole.  Should she be taking both? If she should be taking  Both she will need a new prescription sent to the pharmacy for the omeprazole.    Thank you    Victorina Talbert RN

## 2021-06-16 NOTE — TELEPHONE ENCOUNTER
Reason contacted:  Status update  Information relayed:    Dodie calling from [facility] to notify Dr. Wheeler a speech therapy eval was completed today.    The patient is wanting to complete another video swallow study but Dodie does not feel this would be very beneficial for the patient are there likely wouldn't be any changes to patients treatment.     If Dr. Wheeler would like to place this order per the patients request she can and the patient should be notified of this.   Additional questions:  No  Further follow-up needed:  Yes  Okay to leave a detailed message:  Yes phone number for Dodie: [phone]

## 2021-06-17 NOTE — PROGRESS NOTES
"Clinic Care Coordination Contact    Follow Up Progress Note   Patient , Oscar here today for MEV   Assessment:   Clinic Care Coordination Medication Education INITIAL Visit    Patient presents for:  Medication education, Pill box teaching, Compliance monitoring and Medication reconciliation    Language: English    Communication: Literate in English    Accompanied by: unaccompanied    Patient Living Situation:  Independent with family    Primary Care Provider:  Binta Wheeler MD    Barriers:  Poor insight into disease process, Inadequate support at home, Multiple uncontrolled disease states, Chronic persistent mental illness and Memory deficits    Medication List (see cited below): Patient presents with all ordered medications    Current Outpatient Medications   Medication Sig     amLODIPine (NORVASC) 5 MG tablet 1 tablet (5 mg total) by G-tube route daily.     aspirin 81 mg chewable tablet 1 tablet (81 mg total) by G-tube route daily.     cholecalciferol, vitamin D3, 1,000 unit tablet 1 tablet (1,000 Units total) by G-tube route daily.     citalopram (CELEXA) 20 MG tablet Take 1 tablet (20 mg total) by mouth daily.     famotidine (PEPCID) 40 mg/5 mL (8 mg/mL) oral suspension TAKE 2.5 ML (20 MG TOTAL) BY MOUTH 2 (TWO) TIMES A DAY.     feeding container and pump set Misc Use 30 Bags As Directed every 30 (thirty) days. Kangaroo Matt Enteral Feeding Pump Set 1\" 1000 ml 30 bags per month     ferrous sulfate 325 (65 FE) MG tablet 1 tablet     guar gum (BENEFIBER;NUTRISOURCE) Pack powder packet 2 packets by Enteral Tube route 2 (two) times a day.     nut.tx.impaired digest fxn (PEPTAMEN 1.5) 0.068 gram- 1.5 kcal/mL Liqd Take 1 Bottle by mouth 4 (four) times a day. Peptamen 1.5 @@ 40 ml/hr for 22 hours then off for 2 hours.  Total of 520 CC over 24 hours     syringe, ENFit, non-sterile (MONOJECT ENFIT SYRINGE) 60 mL Syrg Use 1 Syringe As Directed as needed. 30 CC water bolus after before and after feedings "     syringe, ENFit, sterile (MONOJECT ENFIT SYRINGE) 12 mL Syrg Use 1 Syringe As Directed as needed. Monoject enteral syringe female     acetaminophen (TYLENOL) 500 mg/15.62 mL solution 19.99 mL (640 mg total) by G-tube route every 4 (four) hours as needed for fever or pain.     calcium, as carbonate, (TUMS) 200 mg calcium (500 mg) chewable tablet 2 tablets (400 mg total) by G-tube route 4 (four) times a day as needed for heartburn.     loperamide (IMODIUM) 1 mg/5 mL solution 10 mL (2 mg total) by G-tube route daily as needed for diarrhea.     melatonin 3 mg Tab tablet 1 tablet (3 mg total) by Enteral Tube route at bedtime as needed.     nystatin (MYCOSTATIN) powder Apply to affected area 3 times daily     omeprazole (PRILOSEC) 2 mg/mL SusR suspension 10 mL (20 mg total) by Enteral Tube route daily before breakfast.     omeprazole (PRILOSEC) 20 MG capsule PLEASE SEE ATTACHED FOR DETAILED DIRECTIONS       Equipment: Basic pill box- twice daily dosing    Pill Box was set up by support person at visit   on 4/26/21    Medication Set Up: Independent  Medication Administration:  Independent    Compliance: Patient's  administers her medications to her on a daily basis. He stated they continue to receive RN assistance on a weekly basis through Home Care.   Medications were set up my patient's  as follows:    AM: amlodipine, aspirin, vitamin D3, citalopram.     PM: ferrous sulfate.     Patient stated he crushes the amlodipine, aspirin, vitamin D3 citalopram and ferrous sulfate and injects them in to her wife's G-tube with water. He said he flushes the G-tube with water before and after the administration of these medications.     He was able to demonstrate accurately how to administer her liquid famotidine and Nutrisource powder during assessment today.     Patient was able to state the correct amount of Peptamen patient is to receive each day. He stated he feels comfortable assisting patient with her tube  feeding.      Patient continues to receive coaching from home care RN additionally.     Assisted Oscar with refill of he amlodipine. He said a friend will bring him to the pharmacy when it is ready.       Action Plan  RN Will: Will be available as needed.     Care Guide Will:  Care Guide Delegation  No delegation at this time.         Nursing Notes:  Oscar said he and his wife with be moving to an Assisted Living in [place]. No time line yet.  He said the Home Care SW encouraged them to start looking for Assisted Living related to patient's increasing care needs. Oscar said he and patient were going to be meeting with patient's brother and sister this weekend to further discuss this. Offered assistance of [facility] LSW to assist with finding additional Assisted Living resources, but he declined this offer. He said he received enough resources from the Home Care SW.     Goals addressed this encounter:   Goals Addressed                 This Visit's Progress       Patient Stated      Functional (pt-stated)        Goal Statement: Patient and her  Oscar stated: I would like to have home care, specifically an RN to assist me with my medications, a speech therapist, physical therapist, and occupational therapy in the next 30 days.   Date Goal set: 3/23/21  Barriers: Needing assistance with care at home.   Strengths: Support , family and friends.   Date to Achieve By: 4/23/21  Patient expressed understanding of goal: Yes  Action steps to achieve this goal:  1. I understand the the [facility] RN request a referral from my PCP for home care, RN, PT, Speech and OT.  2. I understand home care should be contacted us directly.   3. I work with the home care staff to ensure my needs and safety are being addressed so I can live successfully at home.   4. I will report progress towards this goal at scheduled outreach telephone calls from my CCC team.     Discussed 4/19/21  Discussed on 4/22/21  Discussed on 4/26/21              Intervention/Education provided during outreach: Discussed the importance of administering patient's medications as directed. Encouraged him to ensure patient gets to all of her follow up appointments. Discussed COVID precautions.           Plan: CCC RN will continue to monitor, support patient with current goals and will be available to assist as nursing needs arise.     Care Coordinator will follow up in three weeks.

## 2021-06-17 NOTE — PROGRESS NOTES
Clinic Care Coordination Contact    Community Health Worker Follow Up    Goals:   Goals Addressed                 This Visit's Progress       General      Functional (pt-stated)   80%     Goal Statement: Patient and her  Oscar stated: I would like to have home care, specifically an RN to assist me with my medications, a speech therapist, physical therapist, and occupational therapy in the next 30 days.   Date Goal set: 3/23/21  Barriers: Needing assistance with care at home.   Strengths: Support , family and friends.   Date to Achieve By: 4/23/21  Patient expressed understanding of goal: Yes  Action steps to achieve this goal:  1. I understand the the [facility] RN request a referral from my PCP for home care, RN, PT, Speech and OT.  2. I understand home care should be contacted us directly.   3. I work with the home care staff to ensure my needs and safety are being addressed so I can live successfully at home.   4. I will report progress towards this goal at scheduled outreach telephone calls from my CCC team.     Discussed 5/24/21        Psychosocial (pt-stated)   20%     Goal Statement:  I would like to complete an Advance Care Directive in the next 6 months.   Date Goal set: 3/23/21  Barriers: None  Strengths: Strong support from  and family.   Date to Achieve By: 9/23/21  Patient expressed understanding of goal: yes  Action steps to achieve this goal:  1. I understand the [facility] RN will mail a copy of the Advance Care Directive directly to me.  2. I will complete the Advance Care Directive and forward a copy to to my Clinic.   3. I will report progress towards this goal at scheduled outreach telephone calls from the CCC team.     Discussed 5/24/21            CHW Next Follow-up: 1 month    CHW Plan: CHW will continue to support patient with goals through routine scheduled outreach.     Patient's spouse states that they have limited home care support and that he provides most of her care and  medication set up.    They are moving on 6/2/21 to [facility] in [place] where they have different levels of care available.     Next outreach due: 6/24/21

## 2021-06-17 NOTE — PROGRESS NOTES
Patient saw CCC RN on 4/26/21 and discussed goals.     CHW delegations:  Follow up within 1 month    Next outreach due: 5/17/21

## 2021-06-17 NOTE — TELEPHONE ENCOUNTER
Refill Approved    Rx renewed per Medication Renewal Policy. Medication was last renewed on 9/9/20.    Luis Smiley, [facility] Triage/Med Refill 5/14/2021     Requested Prescriptions   Pending Prescriptions Disp Refills     famotidine (PEPCID) 40 mg/5 mL (8 mg/mL) oral suspension [Pharmacy Med Name: FAMOTIDINE 40 MG/5 ML SUSP] 450 mL 3     Sig: TAKE 2.5 ML (20 MG TOTAL) BY MOUTH 2 (TWO) TIMES A DAY.       GI Medications Refill Protocol Passed - 5/14/2021 10:31 AM        Passed - PCP or prescribing provider visit in last 12 or next 3 months.     Last office visit with prescriber/PCP: 2/23/2021 Binta Wheeler MD OR same dept: 2/23/2021 Binta Wheeler MD OR same specialty: 2/23/2021 Binta Wheeler MD  Last physical: 12/10/2020 Last MTM visit: Visit date not found   Next visit within 3 mo: Visit date not found  Next physical within 3 mo: Visit date not found  Prescriber OR PCP: Binta Wheeler MD  Last diagnosis associated with med order: 1. Esophagitis  - famotidine (PEPCID) 40 mg/5 mL (8 mg/mL) oral suspension [Pharmacy Med Name: FAMOTIDINE 40 MG/5 ML SUSP]; TAKE 2.5 ML (20 MG TOTAL) BY MOUTH 2 (TWO) TIMES A DAY.  Dispense: 450 mL; Refill: 3    If protocol passes may refill for 12 months if within 3 months of last provider visit (or a total of 15 months).

## 2021-06-18 NOTE — PATIENT INSTRUCTIONS - HE
Patient Instructions by Binta Wheeler MD at 12/10/2020  1:50 PM     Author: Binta Wheeler MD Service: -- Author Type: Physician    Filed: 12/10/2020  1:49 PM Encounter Date: 12/10/2020 Status: Signed    : Binta Wheeler MD (Physician)         Patient Education     Exercise for a Healthier Heart  You may wonder how you can improve the health of your heart. If youre thinking about exercise, youre on the right track. You dont need to become an athlete, but you do need a certain amount of brisk exercise to help strengthen your heart. If you have been diagnosed with a heart condition, your doctor may recommend exercise to help stabilize your condition. To help make exercise a habit, choose safe, fun activities.       Be sure to check with your health care provider before starting an exercise program.    Why exercise?  Exercising regularly offers many healthy rewards. It can help you do all of the following:    Improve your blood cholesterol levels to help prevent further heart trouble    Lower your blood pressure to help prevent a stroke or heart attack    Control diabetes, or reduce your risk of getting this disease    Improve your heart and lung function    Reach and maintain a healthy weight    Make your muscles stronger and more limber so you can stay active    Prevent falls and fractures by slowing the loss of bone mass (osteoporosis)    Manage stress better  Exercise tips  Ease into your routine. Set small goals. Then build on them.  Exercise on most days. Aim for a total of 150 or more minutes of moderate to  vigorous intensity activity each week. Consider 40 minutes, 3 to 4 times a week. For best results, activity should last for 40 minutes on average. It is OK to work up to the 40 minute period over time. Examples of moderate-intensity activity is walking one mile in 15 minutes or 30 to 45 minutes of yard work.  Step up your daily activity level. Along with your exercise program, try  being more active throughout the day. Walk instead of drive. Do more household tasks or yard work.  Choose one or more activities you enjoy. Walking is one of the easiest things you can do. You can also try swimming, riding a bike, or taking an exercise class.  Stop exercising and call your doctor if you:    Have chest pain or feel dizzy or lightheaded    Feel burning, tightness, pressure, or heaviness in your chest, neck, shoulders, back, or arms    Have unusual shortness of breath    Have increased joint or muscle pain    Have palpitations or an irregular heartbeat      4384-4076 [employer]. [address] [place] [address]. All rights reserved. This information is not intended as a substitute for professional medical care. Always follow your healthcare professional's instructions.         Patient Education   Understanding "[employer]" MyPlate  The USDA (US Department of Agriculture) has guidelines to help you make healthy food choices. These are called MyPlate. MyPlate shows the food groups that make up healthy meals using the image of a place setting. Before you eat, think about the healthiest choices for what to put onto your plate or into your cup or bowl. To learn more about building a healthy plate, visit www.choosemyplate.gov.       The Food Groups    Fruits: Any fruit or 100% fruit juice counts as part of the Fruit Group. Fruits may be fresh, canned, frozen, or dried, and may be whole, cut-up, or pureed. Make half your plate fruits and vegetables.    Vegetables: Any vegetable or 100% vegetable juice counts as a member of the Vegetable Group. Vegetables may be fresh, frozen, canned, or dried. They can be served raw or cooked and may be whole, cut-up, or mashed. Make half your plate fruits and vegetables.     Grains: All foods made from grains are part of the Grains Group. These include wheat, rice, oats, cornmeal, and barley such as bread, pasta, oatmeal, cereal, tortillas, and grits. Grains  should be no more than a quarter of your plate. At least half of your grains should be whole grains.    Protein: This group includes meat, poultry, seafood, beans and peas, eggs, processed soy products (like tofu), nuts (including nut butters), and seeds. Make protein choices no more than a quarter of your plate. Meat and poultry choices should be lean or low fat.    Dairy: All fluid milk products and foods made from milk that contain calcium, like yogurt and cheese are part of the Dairy Group. (Foods that have little calcium, such as cream, butter, and cream cheese, are not part of the group.) Most dairy choices should be low-fat or fat-free.    Oils: These are fats that are liquid at room temperature. They include canola, corn, olive, soybean, and sunflower oil. Foods that are mainly oil include mayonnaise, certain salad dressings, and soft margarines. You should have only 5 to 7 teaspoons of oils a day. You probably already get this much from the food you eat.  Use "[employer]" to Help Build Your Meals  The [employer] can help you plan and track your meals and activity. You can look up individual foods to see or compare their nutritional value. You can get guidelines for what and how much you should eat. You can compare your food choices. And you can assess personal physical activities and see ways you can improve. Go to www.[employer].gov/supertracker/.    8989-0322 [employer] [address] [place] [address]. All rights reserved. This information is not intended as a substitute for professional medical care. Always follow your healthcare professional's instructions.           Patient Education   Instrumental Activities of Daily Living  Your Health Risk Assessment indicates you have difficulties with instrumental activities of daily living which include laundry, housekeeping, banking, shopping, using the telephone, food preparation, transportation, or taking your own medications.  Please make a follow up appointment for us to address this issue in more detail.    [employer] has resources available on the following website: https://www.healthJivox.org/caregivers.[email or web address]     Also, here is a local agency that provides help with meals and other assistance:   [facility] Line: 763.279.1171     Patient Education   Understanding Advance Care Planning  Advance care planning is the process of deciding ones own future medical care. It helps ensure that if you cant speak for yourself, your wishes can still be carried out. The plan is a series of legal documents that note a persons wishes. The documents vary by state. Advance care planning may be done when a person has a serious illness that is expected to get worse. It may be done before major surgery. And it can help you and your family be prepared in case of a major illness or injury. Advance care planning helps with making decisions at these times.       A health care proxy is a person who acts as the voice of a patient when the patient cant speak for himself or herself. The name of this role varies by state. It may be called a Durable Medical Power of  or Durable Power of  for Healthcare. It may be called an agent, surrogate, or advocate. Or it may be called a representative or decision maker. It is an official duty that is identified by a legal document. The document also varies by state.    Why Is Advance Care Planning Important?  If a person communicates their healthcare wishes:    They will be given medical care that matches their values and goals.    Their family members will not be forced to make decisions in a crisis with no guidance.  Creating a Plan  Making an advance care plan is often done in 3 steps:    Thinking about ones wishes. To create an advance care plan, you should think about what kind of medical treatment you would want if you lose the ability to communicate. Are there any situations in which you would refuse or  stop treatment? Are there therapies you would want or not want? And whom do you want to make decisions for you? There are many places to learn more about how to plan for your care. Ask your doctor or  for resources.    Picking a health care proxy. This means choosing a trusted person to speak for you only when you cant speak for yourself. When you cannot make medical decisions, your proxy makes sure the instructions in your advance care plan are followed. A proxy does not make decisions based on his or her own opinions. They must put aside those opinions and values if needed, and carry out your wishes.    Filling out the legal documents. There are several kinds of legal documents for advance care planning. Each one tells health care providers your wishes. The documents may vary by state. They must be signed and may need to be witnessed or notarized. You can cancel or change them whenever you wish. Depending on your state, the documents may include a Healthcare Proxy form, Living Will, Durable Medical Power of , Advance Directive, or others.  The Familys Role  The best help a family can give is to support their loved ones wishes. Open and honest communication is vital. Family should express any concerns they have about the patients choices while the patient can still make decisions.    6729-1325 The "[employer]". [address] [place] [address], [place] [address]. All rights reserved. This information is not intended as a substitute for professional medical care. Always follow your healthcare professional's instructions.         Also, [employer] [place] offers a free, downloadable health care directive that allows you to share your treatment choices and personal preferences if you cannot communicate your wishes. It also allows you to appoint another person (called a health care agent) to make health care decisions if you are unable to do so. You can download an advance directive by going  here: http://www.[facility].org/honoring-choices.html     Patient Education   Personalized Prevention Plan  You are due for the preventive services outlined below.  Your care team is available to assist you in scheduling these services.  If you have already completed any of these items, please share that information with your care team to update in your medical record.  Health Maintenance   Topic Date Due   ? DEPRESSION ACTION PLAN  1950   ? HEPATITIS C SCREENING  1950   ? DEXA SCAN  1950   ? MAMMOGRAM  04/03/2019   ? LIPID  06/16/2021   ? ADVANCE CARE PLANNING  06/16/2021   ? MEDICARE ANNUAL WELLNESS VISIT  12/10/2021   ? FALL RISK ASSESSMENT  12/10/2021   ? TD 18+ HE  02/07/2023   ? COLORECTAL CANCER SCREENING  04/04/2023   ? Pneumococcal Vaccine: 65+ Years  Completed   ? INFLUENZA VACCINE RULE BASED  Completed   ? ZOSTER VACCINES  Completed   ? Pneumococcal Vaccine: Pediatrics (0 to 5 Years) and At-Risk Patients (6 to 64 Years)  Aged Out   ? HEPATITIS B VACCINES  Aged Out

## 2021-06-18 NOTE — PROGRESS NOTES
Assessment/Plan:    1. Dysphagia, oropharyngeal phase  History of dysphagia, chronic, stable.  No change.  Continue soft diet in order to avoid aspiration concerns etc.    2. Benign Essential Hypertension  Hypertension with blood pressure at goal 128/74.  Continues use of amlodipine 10 mg daily and hydrochlorothiazide 12.5 mg daily.  Check basic metabolic panel for med monitoring.  - Basic Metabolic Panel    3. Ataxic Gait  Referral placed for PT eval and treat for ataxic gait associated with prior CVA with residual ataxia, dysarthria and dysphasia.  - Ambulatory referral to PT/OT    4. CVA, old, dysarthria  As above, stable concerns.  Secondary risk factor reduction reviewed.    5. CKD (chronic kidney disease) stage 3, GFR 30-59 ml/min  CKD stage III.  Repeat basic metabolic panel as well as vitamin D and CBC.  Notify with results.  Reassess at follow-up in 3 months.  - Basic Metabolic Panel  - Vitamin D, Total (25-Hydroxy)  - HM2(CBC w/o Differential)          Subjective:    Bing Rodrigues is seen today for follow-up evaluation.  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  Has had swallow studies previously with recommendation for soft diet which she continues.  Needs to have repeat referral for physical therapy and prefers to see physical therapist that used to work and [facility] and now working at [facility].  History of CKD stage III.  Continues use of amlodipine 10 mg daily and hydrochlorothiazide 12.5 mg daily.  No palpitations.  No chest pain.  Comprehensive review of systems as above otherwise all negative.    Past Surgical History:   Procedure Laterality Date     AR DILATION/CURETTAGE,DIAGNOSTIC      Description: Dilation And Curettage;  Recorded: 02/07/2013;        Family History   Problem Relation Age of Onset     Stroke Maternal Grandmother      Stroke Maternal Grandfather      Early death Paternal Grandmother      COPD Paternal Grandfather      Cancer Father       from smoking        Past Medical History:   Diagnosis Date     Hypertension      Stroke (H)         Social History   Substance Use Topics     Smoking status: Never Smoker     Smokeless tobacco: Never Used     Alcohol use No        Current Outpatient Prescriptions   Medication Sig Dispense Refill     acetaminophen (TYLENOL) 500 MG tablet Take 500-1,000 mg by mouth every 6 (six) hours as needed for pain.       amLODIPine (NORVASC) 10 MG tablet Take 10 mg by mouth daily.       aspirin 81 MG EC tablet Take 81 mg by mouth daily.       cholecalciferol, vitamin D3, 1,000 unit tablet Take 1,000 Units by mouth daily.       hydroCHLOROthiazide (MICROZIDE) 12.5 mg capsule Take 1 capsule (12.5 mg total) by mouth daily. 90 capsule 3     omeprazole (PRILOSEC) 20 MG capsule Take 1 capsule by mouth 2 (two) times a day.       vitamin E 400 UNIT capsule Take 400 Units by mouth daily.       No current facility-administered medications for this visit.           Objective:    Vitals:    06/15/18 1416 06/15/18 1441   BP: 128/88 128/74   Pulse: 67    SpO2: 98%    Weight: 129 lb (58.5 kg)    Height: 5' (1.524 m)       Body mass index is 25.19 kg/(m^2).    Alert.  No apparent distress.  Ataxic gait.  Ablates with cane to assist.  Dysarthric speech.  Chest clear.  Cardiac exam regular.      This note has been dictated using voice recognition software and as a result may contain minor grammatical errors and unintended word substitutions.

## 2021-06-19 NOTE — PROGRESS NOTES
"[facility] Daily Progress     Patient Name: Bing Rodrigues  Date: 2018  Visit #: 2  PTA visit #:  1  Referral Diagnosis: [unfilled]  Referring provider: Isabel Barrios MD  Visit Diagnosis:     ICD-10-CM    1. Ataxic Gait R26.9    2. Generalized muscle weakness M62.81    3. History of CVA (cerebrovascular accident) Z86.73    4. Benign Essential Hypertension I10    5. Chronic Kidney Disease, Stage 3 N18.3          Assessment:   Pt returns today for her first follow up appointment.   Pt requires verbal cues to use her cane correctly and not reach out for the walls.   Pt does well with her exercises.   Pt fatiques easily, requires frequent rest breaks.  Patient is benefitting from skilled physical therapy and is making steady progress toward functional goals.  Patient is appropriate to continue with skilled physical therapy intervention, as indicated by initial plan of care.    Goal Status:  Pt. will demonstrate/verbalize independence in self-management of condition in : 6 weeks  Pt. will be independent with home exercise program in : 6 weeks  Pt. will show improved balance for safer : sitting;ambulation;standing;for dressing/grooming;for household ambulation;for chores;independently;in 6 weeks  Pt. will be able to walk : 10 minutes;with FWB;around the house;in 6 weeks  No Data Recorded    Plan / Patient Education:     Continue with initial plan of care.  Progress with home program as tolerated.    Subjective:   Pt states she does not use her SEC when at home. \" I reach for the walls.\"  Pt reports compliancy with her HEP.   Pt denies any falls or LOB since her last visit.  Pain Ratin      Objective:   Gait observation: pt ambulates with a SEC, B LEs externally rotated. Pt tends not to place her cane or the floor, carries unless cued. She will reach out for the walls.  Exercises: HEP:  Exercise #1: sit<-> stand using arm chair x 5 times  Comment #1: seated: tke  Exercise #2: seated heel/toe " "raises   Comment #2: x10  Exercise #3: adductor squeezes   Comment #3: 5\" x10  Exercise #4: seated hip AB/ER with L1 band   Comment #4: x10  Exercise #5: standing hip AB, heel/toe raises, marching  Comment #5: x10 each  Treatment Today    TREATMENT MINUTES COMMENTS   Evaluation     Self-care/ Home management     Manual therapy     Neuromuscular Re-education  See flow sheet  -Balance drills (not added to home)  -NBOS on firm and foam  -marching  -forward and backward walks 10 fx 6  -side stepping 10ft x 6  -marching 10 ft x2  -tandem walk 10 ft x2   Therapeutic Activity     Therapeutic Exercises  Nustep WL 3 x   See flow sheet  Added to HEP:  -heel/toe raises seated and standing   -standing hip AB  -seated hip ER/AB with L1 band   -seated ball squeezes   Gait training     Modality__________________                Total     Blank areas are intentional and mean the treatment did not include these items.       Maryam Cameron PTA,CLT  7/16/2018      "

## 2021-06-19 NOTE — PROGRESS NOTES
[facility] Certification Request    July 10, 2018      Patient: Bing Rodrigues  MR Number: [id number]  YOB: 1950  Date of Visit: 7/10/2018      Dear Aditya Dumont MD:    Thank you for this referral.   We are seeing Bing Rodrigues for Physical Therapy of left side weakness, impaired gait.    Medicare and/or Medicaid requires physician review and approval of the treatment plan. Please review the plan of care and verify that you agree with the therapy plan of care by co-signing this note.      Plan of Care  Authorization / Certification Start Date: 07/10/18  Authorization / Certification End Date: 08/09/18  Authorization / Certification Number of Visits: 6  Communication with: Referral Source  Patient Related Instruction: Nature of Condition;Treatment plan and rationale;Self Care instruction;Basis of treatment;Body mechanics;Posture;Precautions;Next steps;Expected outcome  Times per Week: 1  Number of Weeks: 6  Number of Visits: 6  Discharge Planning: patient will be discharge to self care.  Therapeutic Exercise: Strengthening  Neuromuscular Reeducation: other  Neuromuscular Re-education: neuro re ducation  Gait Training: using SEC for safety.    Goals:  Pt. will demonstrate/verbalize independence in self-management of condition in : 6 weeks  Pt. will be independent with home exercise program in : 6 weeks  Pt. will show improved balance for safer : sitting;ambulation;standing;for dressing/grooming;for household ambulation;for chores;independently;in 6 weeks  Pt. will be able to walk : 10 minutes;with FWB;around the house;in 6 weeks  No Data Recorded      If you have any questions or concerns, please don't hesitate to call.    Sincerely,      Indigo Ch, PT, T-CHRISTINE        Physician recommendation:     ___ Follow therapist's recommendation        ___ Modify therapy      *Physician co-signature indicates they certify the need for these services furnished within this plan and  while under their care.          [facility]   Balance Initial Evaluation    Patient Name: Bing Rodrigues  Date of evaluation: 7/10/2018  Referral Diagnosis: Abnormality of gait  Referring provider: Aditya Brumfield MD  Visit Diagnosis:     ICD-10-CM    1. Ataxic Gait R26.9    2. Generalized muscle weakness M62.81    3. History of CVA (cerebrovascular accident) Z86.73        Assessment:       Bing Rodrigues is a 68 y.o. female who presents to therapy today with chief complaints of unsteady gait,left side weakness reports no falls recently. Onset date of sx was 2015 had a CVA .  Pt reported h/o no falls or injuries.  Pain symptoms are 0/10.  Functional impairments include stairs up/down, getting in/out of bed/ chair.  Pt demo's signs and sx consistent with left side weakness, unsteady gait, late effect from CVA.     Pt. is appropriate for skilled PT intervention as outlined in the Plan of Care (POC).  Pt. is a good candidate for skilled PT services to improve pain levels and function.    Prior physical therapy: [facility] visits in 2017.    Goals:  Pt. will demonstrate/verbalize independence in self-management of condition in : 6 weeks  Pt. will be independent with home exercise program in : 6 weeks  Pt. will show improved balance for safer : sitting;ambulation;standing;for dressing/grooming;for household ambulation;for chores;independently;in 6 weeks  Pt. will be able to walk : 10 minutes;with FWB;around the house;in 6 weeks      Patient's expectations/goals are realistic.    Barriers to Learning or Achieving Goals:  Chronicity of the problem.       Plan / Patient Instructions:        Plan of Care:   Authorization / Certification Start Date: 07/10/18  Authorization / Certification End Date: 08/09/18  Authorization / Certification Number of Visits: 6  Communication with: Referral Source  Patient Related Instruction: Nature of Condition;Treatment plan and rationale;Self Care instruction;Basis of  treatment;Body mechanics;Posture;Precautions;Next steps;Expected outcome  Times per Week: 1  Number of Weeks: 6  Number of Visits: 6  Discharge Planning: patient will be discharge to self care.  Therapeutic Exercise: Strengthening  Neuromuscular Reeducation: other  Neuromuscular Re-education: neuro re ducation  Gait Training: using SEC for safety.    Plan for next visit: neuro muscular re education, gait training. HEP     Subjective:         Social information:   Living Situation:single family home and lives with others    Occupation:retired   Work Status:NA   Equipment Available: None and SE cane    History of Present Illness:    Bing is a 68 y.o. female who presents to therapy today with complaints of leg weakness.   Pain Ratin  Pain rating at best: 0  Pain rating at worst: 0  Pain description: weakness    Functional limitations are described as occurring with:   lifting  personal cares dressing lower body, donning shirt or jacket, donning bra, combing hair, washing hair and washing body  performing routine daily activities  transitional movements getting in  bed, chair and car and getting out of  bed, chair and car    Patient reports benefit from:  movement or exercise        Objective:      Note: Items left blank indicates the item was not performed or not indicated at the time of the evaluation.    Patient Outcome Measures :    Lower Extremity Functional Scale (_/80): 46/80   Scores range from 0-80, where a score of 80 represents maximum function. The minimal clinically important difference is a positive change of 9 points.    Balance Examination  1. Ataxic Gait     2. Generalized muscle weakness     3. History of CVA (cerebrovascular accident)       Involved side: Left  Posture Observation:      General sitting posture is  fair.  General standing posture is fair.  Cervical:  Moderate forward head  Assistive Device:  SEC  Gait Observation:  Short shuffling steps, mild circumduction, deviated pathway, slow,  carries the cane on the right hand, left upper extremity goes into spasticity and also left lower extremity.    UE strenght : right 5, left 4/5  LE Strength: right 5, left hip flexion 4/5, knee extension 4/5    LE ROM: WFL    Balance Assessment:    TU, 39,37  seconds      Treatment Today     TREATMENT MINUTES COMMENTS   Evaluation 30 low   Self-care/ Home management     Manual therapy     Neuromuscular Re-education 15 Exercises:  Exercise #1: sit<-> stand using arm chair x 5 times  Comment #1: seated: tke     Therapeutic Activity     Therapeutic Exercises     Gait training 15 Instructed and performed gait training using SEC, patient needs manual and verbal cues to improved posture, increase step length.   Modality__________________                Total 60    Blank areas are intentional and mean the treatment did not include these items.              Indigo Ch PT  7/10/2018  3:44 PM

## 2021-06-19 NOTE — PROGRESS NOTES
"[facility] Daily Progress     Patient Name: Bign Rodrigues  Date: 2018  Visit #: 6  PTA visit #:  3  Referral Diagnosis: ataxic gait  Referring provider: Isabel Barrios MD  Visit Diagnosis:     ICD-10-CM    1. Ataxic Gait R26.9    2. Generalized muscle weakness M62.81    3. History of CVA (cerebrovascular accident) Z86.73    4. Benign Essential Hypertension I10    5. Chronic Kidney Disease, Stage 3 N18.3        Assessment:     Pt continues to require verbal cues to place her SEC on the floor and not carry it.   Pt with LOB when trying to take her purse off of her shoulder.  Patient is benefitting from skilled physical therapy and is making steady progress toward functional goals.  Patient is appropriate to continue with skilled physical therapy intervention, as indicated by initial plan of care.    Goal Status: On going  Pt. will demonstrate/verbalize independence in self-management of condition in : 6 weeks;Progressing toward  Pt. will be independent with home exercise program in : 6 weeks;Progressing toward  Pt. will show improved balance for safer : sitting;ambulation;standing;for dressing/grooming;for household ambulation;for chores;independently;in 6 weeks;Progressing toward  Pt. will be able to walk : 10 minutes;with FWB;around the house;in 6 weeks;Progressing toward      Plan / Patient Education:     Continue with initial plan of care.  Progress with home program as tolerated. neuro re education, balance and gait training.HEP    Subjective:   Pt reports no LOB or falls since her last visit.  Pt reports she is doing HEP I'm tired of them, they aren't doing anything.\"  Patient reports\"I'm tired\".  Pain Ratin      Objective:         Exercises: HEP:  Exercise #1: sit<-> stand using arm chair x 15 times. scood on the mat   Comment #1: seated: tke with 3 pounds resistance x 10 reps each.  Exercise #2: seated heel/toe raises   Comment #2: x10  Exercise #3: adductor squeezes   Comment #3: 5\" " "x10  Exercise #4: seated hip AB/ER with L1 band   Comment #4: x10  Exercise #5: standing hip AB, heel/toe raises, marching  Comment #5: x10 each  Exercise #6: mini squats  Comment #6: x10  Exercise #7: standing hip extension  Comment #7: x10 B  Treatment Today    TREATMENT MINUTES COMMENTS   Evaluation     Self-care/ Home management     Manual therapy     Neuromuscular Re-education 30 See flow sheet  -Balance drills (not added to home)  -NBOS on firm and foam with head turns  -marching  -forward and backward walks 10 fx 6  -side stepping 10ft x 6  -marching 10 ft x2  -tandem walk 10 ft x2  Patient needs rest periods due to fatigue.  -walk on 4 dics 10 ft x4  -\"1/2\" star toe taps x60\"  -step over short daly alternating legs x10  -side step over short daly x10 B  -NBOS on airex 30\" x2  -modified tandem stand on airex 30\" x2  -SLS on airex 30\" x2 B  -toe taps on 4 and 6' step x 10 each   Therapeutic Activity      15 Nustep WL 4 x 6 minutes, arms : 8, seat 5  See flow sheet  Seated LAQ with 3# x10  -heel/toe raises seated and standing   -standing hip AB  -seated hip ER/AB with L1 band   -seated ball squeezes  -seated march with L1 band   -4' step ups x10   Gait training 10 Instructed and performed agit training using SEC, patient needs constant verbal cues to increase left knee flexion and to increase step length.150' x 2 times.     Modality__________________                Total 55    Blank areas are intentional and mean the treatment did not include these items.       Maryam Cameron PTA,CLT  8/13/2018      "

## 2021-06-19 NOTE — PROGRESS NOTES
"[facility] Daily Progress     Patient Name: Bing Rodrigues  Date: 2018  Visit #: 5  PTA visit #:  2  Referral Diagnosis: ataxic gait  Referring provider: Isabel Barrios MD  Visit Diagnosis:     ICD-10-CM    1. Ataxic Gait R26.9    2. Generalized muscle weakness M62.81    3. History of CVA (cerebrovascular accident) Z86.73        Assessment:     Short shuffling gait.  Needs frequent rest periods due to fatigue.  Patient is benefitting from skilled physical therapy and is making steady progress toward functional goals.  Patient is appropriate to continue with skilled physical therapy intervention, as indicated by initial plan of care.    Goal Status:  Pt. will demonstrate/verbalize independence in self-management of condition in : 6 weeks;Progressing toward  Pt. will be independent with home exercise program in : 6 weeks;Progressing toward  Pt. will show improved balance for safer : sitting;ambulation;standing;for dressing/grooming;for household ambulation;for chores;independently;in 6 weeks;Progressing toward  Pt. will be able to walk : 10 minutes;with FWB;around the house;in 6 weeks;Progressing toward      Plan / Patient Education:     Continue with initial plan of care.  Progress with home program as tolerated. neuro re education, balance and gait training.HEP    Subjective:     Patient reports\"I'm tired\".  Pain Ratin      Objective:         Exercises: HEP:  Exercise #1: sit<-> stand using arm chair x 15 times. scood on the mat   Comment #1: seated: tke with 3 pounds resistance x 10 reps each.  Exercise #2: seated heel/toe raises   Comment #2: x10  Exercise #3: adductor squeezes   Comment #3: 5\" x10  Exercise #4: seated hip AB/ER with L1 band   Comment #4: x10  Exercise #5: standing hip AB, heel/toe raises, marching  Comment #5: x10 each  Exercise #6: mini squats  Comment #6: x10  Exercise #7: standing hip extension  Comment #7: x10 B  Treatment Today    TREATMENT MINUTES COMMENTS " "  Evaluation     Self-care/ Home management     Manual therapy     Neuromuscular Re-education 45 See flow sheet  -Balance drills (not added to home)  -NBOS on firm and foam with head turns  -marching  -forward and backward walks 10 fx 6  -side stepping 10ft x 6  -marching 10 ft x2  -tandem walk 10 ft x2  Patient needs rest period due to fatigue.  -walk on 4 dics 10 ft x4  -\"1/2\" star toe taps x60\"   Therapeutic Activity       Nustep WL 4 x 6 minutes, arms : 8, seat 5  See flow sheet  Seated LAQ with 3# x10  -heel/toe raises seated and standing   -standing hip AB  -seated hip ER/AB with L1 band   -seated ball squeezes  -seated march with L1 band    Gait training 10 Instructed and performed agit training using SEC, patient needs constant verbal cues to increase left knee flexion and to increase step length.150' x 2 times.     Modality__________________                Total 55    Blank areas are intentional and mean the treatment did not include these items.       Indigo Ch PT,CLSWATI-CHRISTINE  8/6/2018      "

## 2021-06-19 NOTE — PROGRESS NOTES
"[facility] Daily Progress     Patient Name: Bing Rodrigues  Date: 2018  Visit #: 3  PTA visit #:  2  Referral Diagnosis: [unfilled]  Referring provider: Isabel Barrios MD  Visit Diagnosis:     ICD-10-CM    1. Ataxic Gait R26.9    2. Generalized muscle weakness M62.81    3. History of CVA (cerebrovascular accident) Z86.73    4. Benign Essential Hypertension I10    5. Chronic Kidney Disease, Stage 3 N18.3          Assessment:     Pt requires verbal cues to use her cane correctly and not reach out for the walls.   Pt does well with her exercises.   Pt fatiques easily, requires frequent rest breaks.  Pt with difficulty going from sit to stand.   Patient is benefitting from skilled physical therapy and is making steady progress toward functional goals.  Patient is appropriate to continue with skilled physical therapy intervention, as indicated by initial plan of care.    Goal Status:  Pt. will demonstrate/verbalize independence in self-management of condition in : 6 weeks  Pt. will be independent with home exercise program in : 6 weeks  Pt. will show improved balance for safer : sitting;ambulation;standing;for dressing/grooming;for household ambulation;for chores;independently;in 6 weeks  Pt. will be able to walk : 10 minutes;with FWB;around the house;in 6 weeks  No Data Recorded    Plan / Patient Education:     Continue with initial plan of care.  Progress with home program as tolerated.    Subjective:   Pt states she does not like using her cane in her home. \"It gets in the way.\"  Pt denies any falls or LOB since her last visit.  Pt has been doing her exercises 1x/day.  Pt states she had a hard time getting out of her tub. Pt states she does have a hand railing in her tub.  Pain Ratin      Objective:   Gait observation: pt ambulates with a SEC, B LEs externally rotated. Pt tends not to place her cane or the floor, carries unless cued. She will reach out for the walls.     Exercises: " "HEP:  Exercise #1: sit<-> stand using arm chair x 5 times  Comment #1: seated: tke  Exercise #2: seated heel/toe raises   Comment #2: x10  Exercise #3: adductor squeezes   Comment #3: 5\" x10  Exercise #4: seated hip AB/ER with L1 band   Comment #4: x10  Exercise #5: standing hip AB, heel/toe raises, marching  Comment #5: x10 each  Treatment Today    TREATMENT MINUTES COMMENTS   Evaluation     Self-care/ Home management     Manual therapy     Neuromuscular Re-education 35 See flow sheet  -Balance drills (not added to home)  -NBOS on firm and foam with head turns  -marching  -forward and backward walks 10 fx 6  -side stepping 10ft x 6  -marching 10 ft x2  -tandem walk 10 ft x2  -walk on 4 dics 10 ft x4  -\"1/2\" star toe taps x60\"   Therapeutic Activity     Therapeutic Exercises 20 Nustep WL 4 x 6  See flow sheet  Seated LAQ with 2# x10  Added to HEP:  -heel/toe raises seated and standing   -standing hip AB  -seated hip ER/AB with L1 band   -seated ball squeezes  -seated march with L1 band    Gait training     Modality__________________                Total     Blank areas are intentional and mean the treatment did not include these items.       Maryam Cameron PTA,CLT  7/23/2018      "

## 2021-06-19 NOTE — PROGRESS NOTES
"[facility] Daily Progress     Patient Name: Bing Rodrigues  Date: 2018  Visit #: 4  PTA visit #:  2  Referral Diagnosis: ataxic gait  Referring provider: Isabel Barrios MD  Visit Diagnosis:     ICD-10-CM    1. Ataxic Gait R26.9    2. Generalized muscle weakness M62.81    3. History of CVA (cerebrovascular accident) Z86.73          Assessment:     Patient is able to improve gait sequence but does not carry over when ambulating back to the lobby needs constant verbal cues.  Dynamic balance is improving.  Patient is benefitting from skilled physical therapy and is making steady progress toward functional goals.  Patient is appropriate to continue with skilled physical therapy intervention, as indicated by initial plan of care.    Goal Status:  Pt. will demonstrate/verbalize independence in self-management of condition in : 6 weeks;Progressing toward  Pt. will be independent with home exercise program in : 6 weeks;Progressing toward  Pt. will show improved balance for safer : sitting;ambulation;standing;for dressing/grooming;for household ambulation;for chores;independently;in 6 weeks;Progressing toward  Pt. will be able to walk : 10 minutes;with FWB;around the house;in 6 weeks;Progressing toward      Plan / Patient Education:     Continue with initial plan of care.  Progress with home program as tolerated. neuro re education, balance and gait training.HEP    Subjective:     Patient reports\"I did my exercises\".  Pain Ratin      Objective:         Exercises: HEP:  Exercise #1: sit<-> stand using arm chair x 5 times  Comment #1: seated: tke  Exercise #2: seated heel/toe raises   Comment #2: x10  Exercise #3: adductor squeezes   Comment #3: 5\" x10  Exercise #4: seated hip AB/ER with L1 band   Comment #4: x10  Exercise #5: standing hip AB, heel/toe raises, marching  Comment #5: x10 each  Exercise #6: mini squats  Comment #6: x10  Exercise #7: standing hip extension  Comment #7: x10 B  Treatment " "Today    TREATMENT MINUTES COMMENTS   Evaluation     Self-care/ Home management     Manual therapy     Neuromuscular Re-education 45 See flow sheet  -Balance drills (not added to home)  -NBOS on firm and foam with head turns  -marching  -forward and backward walks 10 fx 6  -side stepping 10ft x 6  -marching 10 ft x2  -tandem walk 10 ft x2  -walk on 4 dics 10 ft x4  -\"1/2\" star toe taps x60\"   Therapeutic Activity       Nustep WL 4 x 6, arms : 8, seat 5  See flow sheet  Seated LAQ with 2# x10  -heel/toe raises seated and standing   -standing hip AB  -seated hip ER/AB with L1 band   -seated ball squeezes  -seated march with L1 band    Gait training 15 Instructed and performed agit training using SEC, patient needs constant verbal cues to increase left knee flexion and to increase step length.150' x 2 times.   Modality__________________                Total 60    Blank areas are intentional and mean the treatment did not include these items.       Indigo Ch PT,PAMELA-CHRISTINE  7/30/2018      "

## 2021-06-20 NOTE — PROGRESS NOTES
[facility]      Facility:    [facility] [[id number]]  Code Status: FULL CODE       Chief Complaint/Reason for Visit:  Chief Complaint   Patient presents with     H & P     New admit to TCU for aspiration pneumonia, hx stroke, has new tube feeding.       HPI:   Bing is a 68 y.o. female with hx of stroke and left sided weakness, admitted to the hospital on 8/15/18 for aspiration pneumonitis. Her discharge summary is partially excerpted below.    Bing Rodrigues is a 68 y.o. female with past history of stroke with left partial hemiparesis and dysphagia, chronic kidney disease stage III, hypertension, microcytic anemia admitted for aspiration pneumonitis secondary to dysphagia.:      Aspiration pneumonitis secondary to chronic dysphagia-patient was febrile on admission PNA 2/2 chronic dysphagia:  No further fevers. Completed 7 days of zosyn -fever and cough resolved.  Now n.p.o.      Dysphagia: No new stroke seen on MRI. plan to give 3 month trial of TF prior to determining if swallow function will return. Will need to continue SLP.      Malnutrition-: GJ placed 8/20. Did not tolerate rate advancement overnight x2.  Switched to Peptamen 1.5 at rate of 30 mL an hour and she seems to be tolerating this, advanced toe 40 mL, did well....  - will need T-fastener removed about 9/3      Abdominal pain after PEG: PEG tube site well appearing . Suspect was either due to the higher rate or abdominal wall pain from the insertion. Has resolved.      Diarrhea: Improving with Benefiber.  Negative C. difficile.  Only occurring once a day.  Imodium low-dose as needed.  No abdominal distention or pain.      Weakness: 2/2 PNA?-Improved.      Hx CVA: continue ASA (via tube).  Has partial left hemiparesis from previous stroke with chronic dysphagia.      CKD 3-stable.      Essential hypertension-patient's hydrochlorothiazide and calcium channel blocker have been held due to mild  bradycardia.  Her blood pressure is well controlled on lisinopril.:      Renal mass: Patient was found to have a cyst on CT, renal ultrasound confirmed that it was a 4.4 cm simple cyst. .      Lung nodule: Patient was found to have a benign appearing left 4 mm lung nodule, she has no past history of tobacco use.  Recommend she discuss further follow-up with her primary physician after discharge.  Fleischner guidelines indicate for a 4 mm solitary lung nodule, and a low risk individual no further follow-up is needed.  One option if her primary physician is not comfortable managing would be follow-up with the lung nodule clinic.  4      Metabolic acidosis / Hyperchloremia: secondary to IVF, resolved.      Anemia: microcytic for last year, recent baseline around 13. 12.3, decreased likely secondary to IV fluid, stable at 10.7.  Recommend outpatient evaluation and recheck.        Overall stabilized and discharged to TCU on 8/25/18 for PT, OT, nursing cares, medical management and monitoring.       Today:  She is tolerating the tube feeding. NPO. Working with speech therapy. Also PT and OT for strengthening. She states she has a chronic cough, no changes. Does not feel congested. No fever. No dizziness. She denies abdominal pain. No shortness of breath or chest pain. Has loose stool daily. No new vision or hearing problems. Using walker here but states at home does not use any assistive device to ambulate. Lives with .       Past Medical History:  Past Medical History:   Diagnosis Date     Hypertension      Stroke (H)            Surgical History:  Past Surgical History:   Procedure Laterality Date     TN DILATION/CURETTAGE,DIAGNOSTIC      Description: Dilation And Curettage;  Recorded: 02/07/2013;       Family History:   Family History   Problem Relation Age of Onset     Stroke Maternal Grandmother      Stroke Maternal Grandfather      Early death Paternal Grandmother      COPD Paternal Grandfather      Cancer  Father      from smoking       Social History:    Social History     Social History     Marital status:      Spouse name: N/A     Number of children: N/A     Years of education: N/A     Social History Main Topics     Smoking status: Never Smoker     Smokeless tobacco: Never Used     Alcohol use No     Drug use: No     Sexual activity: No     Other Topics Concern     Not on file     Social History Narrative        Medications:  Current Outpatient Prescriptions   Medication Sig     acetaminophen (TYLENOL) 500 mg/15.62 mL solution 19.99 mL (640 mg total) by G-tube route every 4 (four) hours as needed for fever or pain.     aspirin 81 mg chewable tablet 1 tablet (81 mg total) by G-tube route daily.     calcium, as carbonate, (TUMS) 200 mg calcium (500 mg) chewable tablet 2 tablets (400 mg total) by G-tube route 4 (four) times a day as needed for heartburn.     cholecalciferol, vitamin D3, 1,000 unit tablet 1 tablet (1,000 Units total) by G-tube route daily.     guar gum (BENEFIBER;NUTRISOURCE) Pack powder packet 2 packets by Enteral Tube route 2 (two) times a day.     lisinopril (PRINIVIL,ZESTRIL) 40 MG tablet 1 tablet (40 mg total) by G-tube route daily.     loperamide (IMODIUM) 1 mg/5 mL solution 10 mL (2 mg total) by G-tube route daily as needed for diarrhea.     melatonin 3 mg Tab tablet 1 tablet (3 mg total) by Enteral Tube route at bedtime as needed.     omeprazole (PRILOSEC) 2 mg/mL SusR suspension 10 mL (20 mg total) by Enteral Tube route daily before breakfast.       Allergies:  No Known Allergies      Review of Systems:  Pertinent items are noted in HPI.      Physical Exam:   General: Patient is alert female, no distress. Dysarthric voice.  Vitals: /68, Temp 98.2, Pulse 64, RR 18, O2 sat 99% RA.  HEENT: Head is NCAT. Eyes show no injection or icterus. Nares negative. Oropharynx well hydrated.  Neck: Supple. No tenderness or adenopathy. No JVD.  Lungs: Diminished. Occ cough.No  wheezes.  Cardiovascular: Regular rate and rhythm, normal S1, S2.  Back: No spinal or CVA tenderness.  Abdomen: GJ tube. Soft, no tenderness on exam. Bowel sounds present. No guarding rebound or rigidity.  : Deferred.  Extremities: No edema is noted.  Musculoskeletal: Age related degen changes. Weak left side.  Skin: Warm and dry.  Psych: Mood appears good.      Labs:  Component      Latest Ref Rng & Units 8/15/2018 8/19/2018 8/20/2018 8/21/2018   GFR MDRD Non Af Amer      >60 mL/min/1.73m2 26 (L) 32 (L) 33 (L) 36 (L)   GFR MDRD Af Amer      >60 mL/min/1.73m2 32 (L) 38 (L) 40 (L) 44 (L)   Sodium      136 - 145 mmol/L 140 142 145 142   Anion Gap, Calculation      5 - 18 mmol/L 12 9 9 10   Glucose      70 - 125 mg/dL 110 115 104 131 (H)   Potassium      3.5 - 5.0 mmol/L 3.8 3.4 (L) 4.1 3.9   BUN      8 - 22 mg/dL 29 (H) 9 5 (L) 5 (L)   Calcium      8.5 - 10.5 mg/dL 9.6 8.5 8.7 8.5   Chloride      98 - 107 mmol/L 105 111 (H) 115 (H) 114 (H)   Creatinine      0.60 - 1.10 mg/dL 1.90 (H) 1.62 (H) 1.55 (H) 1.44 (H)   CO2      22 - 31 mmol/L 23 22 21 (L) 18 (L)     Component      Latest Ref Rng & Units 8/22/2018 8/24/2018   GFR MDRD Non Af Amer      >60 mL/min/1.73m2 41 (L) 43 (L)   GFR MDRD Af Amer      >60 mL/min/1.73m2 49 (L) 52 (L)   Sodium      136 - 145 mmol/L 140 142   Anion Gap, Calculation      5 - 18 mmol/L 9 10   Glucose      70 - 125 mg/dL 127 (H) 113   Potassium      3.5 - 5.0 mmol/L 3.7 4.4   BUN      8 - 22 mg/dL 9 19   Calcium      8.5 - 10.5 mg/dL 9.1 9.1   Chloride      98 - 107 mmol/L 110 (H) 109 (H)   Creatinine      0.60 - 1.10 mg/dL 1.30 (H) 1.24 (H)   CO2      22 - 31 mmol/L 21 (L) 23     Component      Latest Ref Rng & Units 8/15/2018 8/19/2018 8/20/2018 8/24/2018   WBC      4.0 - 11.0 thou/uL 9.1 4.9 4.8 8.2   RBC      3.80 - 5.40 mill/uL 4.83 4.03 3.97 4.20   Hemoglobin      12.0 - 16.0 g/dL 12.3 10.1 (L) 10.0 (L) 10.7 (L)   Hematocrit      35.0 - 47.0 % 36.9 30.9 (L) 31.0 (L) 33.0 (L)   MCV       80 - 100 fL 76 (L) 77 (L) 78 (L) 79 (L)   MCH      27.0 - 34.0 pg 25.5 (L) 25.1 (L) 25.2 (L) 25.5 (L)   MCHC      32.0 - 36.0 g/dL 33.3 32.7 32.3 32.4   RDW      11.0 - 14.5 % 14.9 (H) 14.6 (H) 14.7 (H) 15.8 (H)   Platelets      140 - 440 thou/uL 183 159 155 192   MPV      8.5 - 12.5 fL 13.0 (H) 12.7 (H) 12.0 11.8       Assessment/Plan:  1. Aspiration pneumonitis. Completed antibiotics in the hospital. No fever. Conn to observe off abx.  2. Hx of stroke. Residual left sided weakness, does not usually use cane or walker.  3. Dysphagia. Now has GJ feeding tube. NPO. Working with speech therapy. Plan for at least 3 months feeding tube and reassess if swallowing function will return.  4. HTN. Meds adjusted in the hospital. Currently on Lisinopril. Monitor BPs here to assess need for further changes.  5. CKD. Labs as noted above.  6. Anemia. Monitor periodically.  7. Renal cyst. Follow up with PMD later.  8. Lung nodule. Defer to PMD follow up later.  9. Code status is full code.      Total time greater than 60 minutes, greater than 50% counseling and coordination of care, time spent in interview and examination of patient, review of records, discussion with nursing staff.      Electronically signed by: Tamika Hair MD

## 2021-06-20 NOTE — PROGRESS NOTES
[facility]    Facility:   [facility] [[id number]]   Code Status: FULL CODE       Chief Complaint   Patient presents with     Follow Up     TCU 10/2/18.       HPI (from TCU H & P 8/30/18):   Bing is a 68 y.o. female with hx of stroke and left sided weakness, admitted to the hospital on 8/15/18 for aspiration pneumonitis. Her discharge summary is partially excerpted below.    Bing Rodrigues is a 68 y.o. female with past history of stroke with left partial hemiparesis and dysphagia, chronic kidney disease stage III, hypertension, microcytic anemia admitted for aspiration pneumonitis secondary to dysphagia.:      Aspiration pneumonitis secondary to chronic dysphagia-patient was febrile on admission PNA 2/2 chronic dysphagia:  No further fevers. Completed 7 days of zosyn -fever and cough resolved.  Now n.p.o.      Dysphagia: No new stroke seen on MRI. plan to give 3 month trial of TF prior to determining if swallow function will return. Will need to continue SLP.      Malnutrition-: GJ placed 8/20. Did not tolerate rate advancement overnight x2.  Switched to Peptamen 1.5 at rate of 30 mL an hour and she seems to be tolerating this, advanced toe 40 mL, did well....  - will need T-fastener removed about 9/3      Abdominal pain after PEG: PEG tube site well appearing . Suspect was either due to the higher rate or abdominal wall pain from the insertion. Has resolved.      Diarrhea: Improving with Benefiber.  Negative C. difficile.  Only occurring once a day.  Imodium low-dose as needed.  No abdominal distention or pain.      Weakness: 2/2 PNA?-Improved.      Hx CVA: continue ASA (via tube).  Has partial left hemiparesis from previous stroke with chronic dysphagia.      CKD 3-stable.      Essential hypertension-patient's hydrochlorothiazide and calcium channel blocker have been held due to mild bradycardia.  Her blood pressure is well controlled on lisinopril.:      Renal  mass: Patient was found to have a cyst on CT, renal ultrasound confirmed that it was a 4.4 cm simple cyst. .      Lung nodule: Patient was found to have a benign appearing left 4 mm lung nodule, she has no past history of tobacco use.  Recommend she discuss further follow-up with her primary physician after discharge.  Fleischner guidelines indicate for a 4 mm solitary lung nodule, and a low risk individual no further follow-up is needed.  One option if her primary physician is not comfortable managing would be follow-up with the lung nodule clinic.  4      Metabolic acidosis / Hyperchloremia: secondary to IVF, resolved.      Anemia: microcytic for last year, recent baseline around 13. 12.3, decreased likely secondary to IV fluid, stable at 10.7.  Recommend outpatient evaluation and recheck.      Overall stabilized and discharged to TCU on 8/25/18 for PT, OT, nursing cares, medical management and monitoring.       Past Medical History:  Past Medical History:   Diagnosis Date     Hypertension      Stroke (H)        Medications:  Current Outpatient Prescriptions   Medication Sig     acetaminophen (TYLENOL) 500 mg/15.62 mL solution 19.99 mL (640 mg total) by G-tube route every 4 (four) hours as needed for fever or pain.     aspirin 81 mg chewable tablet 1 tablet (81 mg total) by G-tube route daily.     calcium, as carbonate, (TUMS) 200 mg calcium (500 mg) chewable tablet 2 tablets (400 mg total) by G-tube route 4 (four) times a day as needed for heartburn.     cholecalciferol, vitamin D3, 1,000 unit tablet 1 tablet (1,000 Units total) by G-tube route daily.     guar gum (BENEFIBER;[employer]) Pack powder packet 2 packets by Enteral Tube route 2 (two) times a day.     lisinopril (PRINIVIL,ZESTRIL) 40 MG tablet 1 tablet (40 mg total) by G-tube route daily. (Patient taking differently: 10 mg by G-tube route daily. )     loperamide (IMODIUM) 1 mg/5 mL solution 10 mL (2 mg total) by G-tube route daily as needed for  diarrhea.     melatonin 3 mg Tab tablet 1 tablet (3 mg total) by Enteral Tube route at bedtime as needed.     omeprazole (PRILOSEC) 2 mg/mL SusR suspension 10 mL (20 mg total) by Enteral Tube route daily before breakfast.       Today:  Tolerating TF, continues strict NPO. She thought she was supposed to have swallow study but there is nothing scheduled and hospital records indicated plan for 3 months trial of TF, no order for repeat study given. She was treated for aspiration pneumonia in the hospital, not on abx any longer. No fever. No abdominal pain. States she has a cough but that she had a cough before she went in the hospital. No shortness of breath. Wants to walk without her walker. Has significant gait instability. BPs acceptable, lisinopril dose had been decreased due to low BPs.      Physical Exam:   General: Patient is alert female, no distress. Dysarthric voice.  Vitals: /71, Temp 97.3, Pulse 62, RR 16, O2 sat 94% RA.  HEENT: Head is NCAT. Eyes show no injection or icterus. Nares negative. Oropharynx well hydrated.  Neck: Supple. No tenderness or adenopathy. No JVD.  Lungs: Diminished. Occ cough. No wheezes.  Cardiovascular: Regular rate and rhythm, normal S1, S2.  Back: No spinal or CVA tenderness.  Abdomen: GJ tube. Soft, no tenderness on exam. Bowel sounds present. No guarding rebound or rigidity.  Extremities: No edema is noted.  Musculoskeletal: Age related degen changes. Weak left side.      Labs:  Component      Latest Ref Rng & Units 8/15/2018 8/19/2018 8/20/2018 8/21/2018   GFR MDRD Non Af Amer      >60 mL/min/1.73m2 26 (L) 32 (L) 33 (L) 36 (L)   GFR MDRD Af Amer      >60 mL/min/1.73m2 32 (L) 38 (L) 40 (L) 44 (L)   Sodium      136 - 145 mmol/L 140 142 145 142   Anion Gap, Calculation      5 - 18 mmol/L 12 9 9 10   Glucose      70 - 125 mg/dL 110 115 104 131 (H)   Potassium      3.5 - 5.0 mmol/L 3.8 3.4 (L) 4.1 3.9   BUN      8 - 22 mg/dL 29 (H) 9 5 (L) 5 (L)   Calcium      8.5 - 10.5 mg/dL  9.6 8.5 8.7 8.5   Chloride      98 - 107 mmol/L 105 111 (H) 115 (H) 114 (H)   Creatinine      0.60 - 1.10 mg/dL 1.90 (H) 1.62 (H) 1.55 (H) 1.44 (H)   CO2      22 - 31 mmol/L 23 22 21 (L) 18 (L)     Component      Latest Ref Rng & Units 8/22/2018 8/24/2018   GFR MDRD Non Af Amer      >60 mL/min/1.73m2 41 (L) 43 (L)   GFR MDRD Af Amer      >60 mL/min/1.73m2 49 (L) 52 (L)   Sodium      136 - 145 mmol/L 140 142   Anion Gap, Calculation      5 - 18 mmol/L 9 10   Glucose      70 - 125 mg/dL 127 (H) 113   Potassium      3.5 - 5.0 mmol/L 3.7 4.4   BUN      8 - 22 mg/dL 9 19   Calcium      8.5 - 10.5 mg/dL 9.1 9.1   Chloride      98 - 107 mmol/L 110 (H) 109 (H)   Creatinine      0.60 - 1.10 mg/dL 1.30 (H) 1.24 (H)   CO2      22 - 31 mmol/L 21 (L) 23     Component      Latest Ref Rng & Units 8/15/2018 8/19/2018 8/20/2018 8/24/2018   WBC      4.0 - 11.0 thou/uL 9.1 4.9 4.8 8.2   RBC      3.80 - 5.40 mill/uL 4.83 4.03 3.97 4.20   Hemoglobin      12.0 - 16.0 g/dL 12.3 10.1 (L) 10.0 (L) 10.7 (L)   Hematocrit      35.0 - 47.0 % 36.9 30.9 (L) 31.0 (L) 33.0 (L)   MCV      80 - 100 fL 76 (L) 77 (L) 78 (L) 79 (L)   MCH      27.0 - 34.0 pg 25.5 (L) 25.1 (L) 25.2 (L) 25.5 (L)   MCHC      32.0 - 36.0 g/dL 33.3 32.7 32.3 32.4   RDW      11.0 - 14.5 % 14.9 (H) 14.6 (H) 14.7 (H) 15.8 (H)   Platelets      140 - 440 thou/uL 183 159 155 192   MPV      8.5 - 12.5 fL 13.0 (H) 12.7 (H) 12.0 11.8       Results for orders placed or performed in visit on 08/27/18   Basic Metabolic Panel   Result Value Ref Range    Sodium 140 136 - 145 mmol/L    Potassium 4.7 3.5 - 5.0 mmol/L    Chloride 108 (H) 98 - 107 mmol/L    CO2 23 22 - 31 mmol/L    Anion Gap, Calculation 9 5 - 18 mmol/L    Glucose 110 70 - 125 mg/dL    Calcium 9.2 8.5 - 10.5 mg/dL    BUN 28 (H) 8 - 22 mg/dL    Creatinine 1.11 (H) 0.60 - 1.10 mg/dL    GFR MDRD Af Amer 59 (L) >60 mL/min/1.73m2    GFR MDRD Non Af Amer 49 (L) >60 mL/min/1.73m2     Lab Results   Component Value Date    HGB 10.8 (L)  08/27/2018         Assessment/Plan:  1. Dysphagia. NPO on tube feeding. Plan was for 3 month trial prior to eval of swallow function. Some question on timing of repeat swallow study.   2. Hx of stroke. Residual left sided weakness. Continue in therapy for strengthening. She has gait instability.  3. HTN. On lisinopril, dose reduced. Cont to monitor.   4. CKD. Labs as noted above.  5. Anemia. Monitor periodically. Last hgb at 10.8  6. Aspiration pneumonitis. Completed abx, No fever. Monitor.        Electronically signed by: Tamika Hair MD

## 2021-06-20 NOTE — PROGRESS NOTES
[facility]    Facility:   [facility] [[id number]]   Code Status: FULL CODE      CHIEF COMPLAINT/REASON FOR VISIT:  Chief Complaint   Patient presents with     Review Of Multiple Medical Conditions       HISTORY:      HPI: Bing is a 68 y.o. female  with past history of stroke with left partial hemiparesis and dysphagia, chronic kidney disease stage III, hypertension, microcytic anemia admitted to the hospital for  aspiration pneumonitis secondary to dysphagia.: She completed 7 days of zosyn -fever and cough resolved. She stabilized and is now undergoing physical and occupational therapy at [facility].     Today she is seen in her room. She denied CP or shortness of breath.  She is tolerating her TF with no reports of diarrhea. She will have another swallow study done on 10/12/18. She denied cough or congestion however she was noted to have increased saliva in her mouth and I have ordered oral suction PRN.She reports she is moving her bowels and no issues with urination. She is participating in therapy.     Past Medical History:   Diagnosis Date     Hypertension      Stroke (H)              Family History   Problem Relation Age of Onset     Stroke Maternal Grandmother      Stroke Maternal Grandfather      Early death Paternal Grandmother      COPD Paternal Grandfather      Cancer Father      from smoking     Social History     Social History     Marital status:      Spouse name: N/A     Number of children: N/A     Years of education: N/A     Social History Main Topics     Smoking status: Never Smoker     Smokeless tobacco: Never Used     Alcohol use No     Drug use: No     Sexual activity: No     Other Topics Concern     Not on file     Social History Narrative         Review of Systems   Constitutional: Positive for appetite change. Negative for chills, fatigue and fever.        On  TF   HENT: Positive for congestion. Negative for sore  throat.    Respiratory: Negative for cough, shortness of breath and wheezing.    Cardiovascular: Negative for chest pain and leg swelling.   Gastrointestinal: Negative for abdominal distention, abdominal pain, constipation, diarrhea and nausea.   Genitourinary: Negative for dysuria.   Musculoskeletal: Negative for arthralgias and myalgias.   Skin: Negative for color change, rash and wound.   Neurological: Positive for weakness. Negative for dizziness.        HX stroke with left sided weakness   Psychiatric/Behavioral: Negative for agitation, behavioral problems and sleep disturbance.       .  Vitals:    10/10/18 1134   BP: 106/62   Pulse: 61   Resp: 18   Temp: 98.1  F (36.7  C)   SpO2: 95%   Weight: 132 lb 9.6 oz (60.1 kg)       Physical Exam   Constitutional: She is oriented to person, place, and time. She appears well-developed and well-nourished.   HENT:   Head: Normocephalic.   Eyes: Conjunctivae are normal.   Neck: Normal range of motion.   Cardiovascular: Normal rate, regular rhythm and normal heart sounds.    No murmur heard.  Pulmonary/Chest: Breath sounds normal. No respiratory distress. She has no wheezes. She has no rales.   Abdominal: Soft. Bowel sounds are normal. She exhibits no distension. There is no tenderness.   G-tube    Musculoskeletal: Normal range of motion. She exhibits no edema.   Neurological: She is alert and oriented to person, place, and time.   Skin: Skin is warm.   Psychiatric: She has a normal mood and affect. Her behavior is normal.         LABS:   No results found for this or any previous visit (from the past 240 hour(s)).  Current Outpatient Prescriptions   Medication Sig     acetaminophen (TYLENOL) 500 mg/15.62 mL solution 19.99 mL (640 mg total) by G-tube route every 4 (four) hours as needed for fever or pain.     aspirin 81 mg chewable tablet 1 tablet (81 mg total) by G-tube route daily.     calcium, as carbonate, (TUMS) 200 mg calcium (500 mg) chewable tablet 2 tablets (400 mg  total) by G-tube route 4 (four) times a day as needed for heartburn.     cholecalciferol, vitamin D3, 1,000 unit tablet 1 tablet (1,000 Units total) by G-tube route daily.     guar gum (BENEFIBER;NUTRISOURCE) Pack powder packet 2 packets by Enteral Tube route 2 (two) times a day.     lisinopril (PRINIVIL,ZESTRIL) 40 MG tablet 1 tablet (40 mg total) by G-tube route daily. (Patient taking differently: 10 mg by G-tube route daily. )     loperamide (IMODIUM) 1 mg/5 mL solution 10 mL (2 mg total) by G-tube route daily as needed for diarrhea.     melatonin 3 mg Tab tablet 1 tablet (3 mg total) by Enteral Tube route at bedtime as needed.     omeprazole (PRILOSEC) 2 mg/mL SusR suspension 10 mL (20 mg total) by Enteral Tube route daily before breakfast.     ASSESSMENT:      ICD-10-CM    1. Benign Essential Hypertension I10    2. Gastro-esophageal reflux disease without esophagitis K21.9    3. Aspiration pneumonia, unspecified aspiration pneumonia type, unspecified laterality, unspecified part of lung (H) J69.0    4. Chronic Kidney Disease, Stage 3 N18.3        PLAN:    Dysphagia. NPO on tube feeding. Repeat swallow study 10/12/18  Hx of stroke. Residual left sided weakness. Continue with therapy.   HTN. Continue lisinopril.   CKD. Monitor labs  Anemia. Stable. Last hgb at 10.8.  Aspiration pneumonitis. Completed abx, No fever. Monitor.    Electronically signed by: Amanda Baires CNP

## 2021-06-20 NOTE — PROGRESS NOTES
[facility]    Facility:   [facility] [[id number]]   Code Status: FULL CODE       Chief Complaint   Patient presents with     Follow Up     TCU 9/20/18.       HPI (from TCU H & P 8/30/18):   Bing is a 68 y.o. female with hx of stroke and left sided weakness, admitted to the hospital on 8/15/18 for aspiration pneumonitis. Her discharge summary is partially excerpted below.    Bing Rodrigues is a 68 y.o. female with past history of stroke with left partial hemiparesis and dysphagia, chronic kidney disease stage III, hypertension, microcytic anemia admitted for aspiration pneumonitis secondary to dysphagia.:      Aspiration pneumonitis secondary to chronic dysphagia-patient was febrile on admission PNA 2/2 chronic dysphagia:  No further fevers. Completed 7 days of zosyn -fever and cough resolved.  Now n.p.o.      Dysphagia: No new stroke seen on MRI. plan to give 3 month trial of TF prior to determining if swallow function will return. Will need to continue SLP.      Malnutrition-: GJ placed 8/20. Did not tolerate rate advancement overnight x2.  Switched to Peptamen 1.5 at rate of 30 mL an hour and she seems to be tolerating this, advanced toe 40 mL, did well....  - will need T-fastener removed about 9/3      Abdominal pain after PEG: PEG tube site well appearing . Suspect was either due to the higher rate or abdominal wall pain from the insertion. Has resolved.      Diarrhea: Improving with Benefiber.  Negative C. difficile.  Only occurring once a day.  Imodium low-dose as needed.  No abdominal distention or pain.      Weakness: 2/2 PNA?-Improved.      Hx CVA: continue ASA (via tube).  Has partial left hemiparesis from previous stroke with chronic dysphagia.      CKD 3-stable.      Essential hypertension-patient's hydrochlorothiazide and calcium channel blocker have been held due to mild bradycardia.  Her blood pressure is well controlled on lisinopril.:      Renal  mass: Patient was found to have a cyst on CT, renal ultrasound confirmed that it was a 4.4 cm simple cyst. .      Lung nodule: Patient was found to have a benign appearing left 4 mm lung nodule, she has no past history of tobacco use.  Recommend she discuss further follow-up with her primary physician after discharge.  Fleischner guidelines indicate for a 4 mm solitary lung nodule, and a low risk individual no further follow-up is needed.  One option if her primary physician is not comfortable managing would be follow-up with the lung nodule clinic.  4      Metabolic acidosis / Hyperchloremia: secondary to IVF, resolved.      Anemia: microcytic for last year, recent baseline around 13. 12.3, decreased likely secondary to IV fluid, stable at 10.7.  Recommend outpatient evaluation and recheck.      Overall stabilized and discharged to TCU on 8/25/18 for PT, OT, nursing cares, medical management and monitoring.       Past Medical History:  Past Medical History:   Diagnosis Date     Hypertension      Stroke (H)        Medications:  Current Outpatient Prescriptions   Medication Sig     acetaminophen (TYLENOL) 500 mg/15.62 mL solution 19.99 mL (640 mg total) by G-tube route every 4 (four) hours as needed for fever or pain.     aspirin 81 mg chewable tablet 1 tablet (81 mg total) by G-tube route daily.     calcium, as carbonate, (TUMS) 200 mg calcium (500 mg) chewable tablet 2 tablets (400 mg total) by G-tube route 4 (four) times a day as needed for heartburn.     cholecalciferol, vitamin D3, 1,000 unit tablet 1 tablet (1,000 Units total) by G-tube route daily.     guar gum (BENEFIBER;Deepclass) Pack powder packet 2 packets by Enteral Tube route 2 (two) times a day.     lisinopril (PRINIVIL,ZESTRIL) 40 MG tablet 1 tablet (40 mg total) by G-tube route daily. (Patient taking differently: 10 mg by G-tube route daily. )     loperamide (IMODIUM) 1 mg/5 mL solution 10 mL (2 mg total) by G-tube route daily as needed for  diarrhea.     melatonin 3 mg Tab tablet 1 tablet (3 mg total) by Enteral Tube route at bedtime as needed.     omeprazole (PRILOSEC) 2 mg/mL SusR suspension 10 mL (20 mg total) by Enteral Tube route daily before breakfast.       Today:  Continues on TF, NPO status. Working with speech therapy. Chronic cough that preceded hospitalization. Treated for aspiration pneumonia in the hospital, not on abx any longer. No fever. No abdominal pain. No shortness of breath though tires easily with therapy. The prison she was looking at will not work out due to TF continuous so she is still looking at options. Hoping to have repeat swallow study in early October, not clear if anything is scheduled yet. BPs low, lisinopril was reduced.      Physical Exam:   General: Patient is alert female, no distress. Dysarthric voice.  HEENT: Head is NCAT. Eyes show no injection or icterus. Nares negative. Oropharynx well hydrated.  Neck: Supple. No tenderness or adenopathy. No JVD.  Lungs: Diminished. Occ cough. No wheezes.  Cardiovascular: Regular rate and rhythm, normal S1, S2.  Back: No spinal or CVA tenderness.  Abdomen: GJ tube. Soft, no tenderness on exam. Bowel sounds present. No guarding rebound or rigidity.  : Deferred.  Extremities: No edema is noted.  Musculoskeletal: Age related degen changes. Weak left side.  Psych: Mood appears pretty good.      Labs:  Component      Latest Ref Rng & Units 8/15/2018 8/19/2018 8/20/2018 8/21/2018   GFR MDRD Non Af Amer      >60 mL/min/1.73m2 26 (L) 32 (L) 33 (L) 36 (L)   GFR MDRD Af Amer      >60 mL/min/1.73m2 32 (L) 38 (L) 40 (L) 44 (L)   Sodium      136 - 145 mmol/L 140 142 145 142   Anion Gap, Calculation      5 - 18 mmol/L 12 9 9 10   Glucose      70 - 125 mg/dL 110 115 104 131 (H)   Potassium      3.5 - 5.0 mmol/L 3.8 3.4 (L) 4.1 3.9   BUN      8 - 22 mg/dL 29 (H) 9 5 (L) 5 (L)   Calcium      8.5 - 10.5 mg/dL 9.6 8.5 8.7 8.5   Chloride      98 - 107 mmol/L 105 111 (H) 115 (H) 114 (H)    Creatinine      0.60 - 1.10 mg/dL 1.90 (H) 1.62 (H) 1.55 (H) 1.44 (H)   CO2      22 - 31 mmol/L 23 22 21 (L) 18 (L)     Component      Latest Ref Rng & Units 8/22/2018 8/24/2018   GFR MDRD Non Af Amer      >60 mL/min/1.73m2 41 (L) 43 (L)   GFR MDRD Af Amer      >60 mL/min/1.73m2 49 (L) 52 (L)   Sodium      136 - 145 mmol/L 140 142   Anion Gap, Calculation      5 - 18 mmol/L 9 10   Glucose      70 - 125 mg/dL 127 (H) 113   Potassium      3.5 - 5.0 mmol/L 3.7 4.4   BUN      8 - 22 mg/dL 9 19   Calcium      8.5 - 10.5 mg/dL 9.1 9.1   Chloride      98 - 107 mmol/L 110 (H) 109 (H)   Creatinine      0.60 - 1.10 mg/dL 1.30 (H) 1.24 (H)   CO2      22 - 31 mmol/L 21 (L) 23     Component      Latest Ref Rng & Units 8/15/2018 8/19/2018 8/20/2018 8/24/2018   WBC      4.0 - 11.0 thou/uL 9.1 4.9 4.8 8.2   RBC      3.80 - 5.40 mill/uL 4.83 4.03 3.97 4.20   Hemoglobin      12.0 - 16.0 g/dL 12.3 10.1 (L) 10.0 (L) 10.7 (L)   Hematocrit      35.0 - 47.0 % 36.9 30.9 (L) 31.0 (L) 33.0 (L)   MCV      80 - 100 fL 76 (L) 77 (L) 78 (L) 79 (L)   MCH      27.0 - 34.0 pg 25.5 (L) 25.1 (L) 25.2 (L) 25.5 (L)   MCHC      32.0 - 36.0 g/dL 33.3 32.7 32.3 32.4   RDW      11.0 - 14.5 % 14.9 (H) 14.6 (H) 14.7 (H) 15.8 (H)   Platelets      140 - 440 thou/uL 183 159 155 192   MPV      8.5 - 12.5 fL 13.0 (H) 12.7 (H) 12.0 11.8       Assessment/Plan:  1. Dysphagia. NPO on tube feeding. Will need follow up swallow study.  2. Hx of stroke. Residual left sided weakness. Continue in therapy for strengthening.   3. HTN. On lisinopril, dose reduced. Cont to monitor.   4. CKD. Labs as noted above.  5. Anemia. Monitor periodically.  6. Aspiration pneumonitis. Completed abx, No fever.        Electronically signed by: Tamika Hair MD

## 2021-06-20 NOTE — PROGRESS NOTES
[facility]    Facility:   [facility] [[id number]]   Code Status: FULL CODE       Chief Complaint   Patient presents with     Follow Up     TCU 9/6/18.       HPI (from TCU H & P 8/30/18):   Bing is a 68 y.o. female with hx of stroke and left sided weakness, admitted to the hospital on 8/15/18 for aspiration pneumonitis. Her discharge summary is partially excerpted below.    Bing Rodrigues is a 68 y.o. female with past history of stroke with left partial hemiparesis and dysphagia, chronic kidney disease stage III, hypertension, microcytic anemia admitted for aspiration pneumonitis secondary to dysphagia.:      Aspiration pneumonitis secondary to chronic dysphagia-patient was febrile on admission PNA 2/2 chronic dysphagia:  No further fevers. Completed 7 days of zosyn -fever and cough resolved.  Now n.p.o.      Dysphagia: No new stroke seen on MRI. plan to give 3 month trial of TF prior to determining if swallow function will return. Will need to continue SLP.      Malnutrition-: GJ placed 8/20. Did not tolerate rate advancement overnight x2.  Switched to Peptamen 1.5 at rate of 30 mL an hour and she seems to be tolerating this, advanced toe 40 mL, did well....  - will need T-fastener removed about 9/3      Abdominal pain after PEG: PEG tube site well appearing . Suspect was either due to the higher rate or abdominal wall pain from the insertion. Has resolved.      Diarrhea: Improving with Benefiber.  Negative C. difficile.  Only occurring once a day.  Imodium low-dose as needed.  No abdominal distention or pain.      Weakness: 2/2 PNA?-Improved.      Hx CVA: continue ASA (via tube).  Has partial left hemiparesis from previous stroke with chronic dysphagia.      CKD 3-stable.      Essential hypertension-patient's hydrochlorothiazide and calcium channel blocker have been held due to mild bradycardia.  Her blood pressure is well controlled on lisinopril.:      Renal  mass: Patient was found to have a cyst on CT, renal ultrasound confirmed that it was a 4.4 cm simple cyst. .      Lung nodule: Patient was found to have a benign appearing left 4 mm lung nodule, she has no past history of tobacco use.  Recommend she discuss further follow-up with her primary physician after discharge.  Fleischner guidelines indicate for a 4 mm solitary lung nodule, and a low risk individual no further follow-up is needed.  One option if her primary physician is not comfortable managing would be follow-up with the lung nodule clinic.  4      Metabolic acidosis / Hyperchloremia: secondary to IVF, resolved.      Anemia: microcytic for last year, recent baseline around 13. 12.3, decreased likely secondary to IV fluid, stable at 10.7.  Recommend outpatient evaluation and recheck.        Overall stabilized and discharged to TCU on 8/25/18 for PT, OT, nursing cares, medical management and monitoring.       Past Medical History:  Past Medical History:   Diagnosis Date     Hypertension      Stroke (H)         Medications:  Current Outpatient Prescriptions   Medication Sig     acetaminophen (TYLENOL) 500 mg/15.62 mL solution 19.99 mL (640 mg total) by G-tube route every 4 (four) hours as needed for fever or pain.     aspirin 81 mg chewable tablet 1 tablet (81 mg total) by G-tube route daily.     calcium, as carbonate, (TUMS) 200 mg calcium (500 mg) chewable tablet 2 tablets (400 mg total) by G-tube route 4 (four) times a day as needed for heartburn.     cholecalciferol, vitamin D3, 1,000 unit tablet 1 tablet (1,000 Units total) by G-tube route daily.     guar gum (BENEFIBER;StoreliftE) Pack powder packet 2 packets by Enteral Tube route 2 (two) times a day.     lisinopril (PRINIVIL,ZESTRIL) 40 MG tablet 1 tablet (40 mg total) by G-tube route daily.     loperamide (IMODIUM) 1 mg/5 mL solution 10 mL (2 mg total) by G-tube route daily as needed for diarrhea.     melatonin 3 mg Tab tablet 1 tablet (3 mg total) by  Enteral Tube route at bedtime as needed.     omeprazole (PRILOSEC) 2 mg/mL SusR suspension 10 mL (20 mg total) by Enteral Tube route daily before breakfast.       Today:  Has occ cough but denies that it is a problem. No shortness of breath. No fever. Working with speech therapy, will have later swallow eval. Wants to ambulate without any help, working with therapy for safety issues. Has hx of stroke with left sided weakness. She is NPO. No dizziness. Has one loose stool daily, has been refusing fiber supplement per GT. BPs low side, continuing to monitor.       Physical Exam:   General: Patient is alert female, no distress. Dysarthric voice.  Vitals: /62, Temp 98.1, Pulse 56, RR 16, O2 sat 97% RA.  HEENT: Head is NCAT. Eyes show no injection or icterus. Nares negative. Oropharynx well hydrated.  Neck: Supple. No tenderness or adenopathy. No JVD.  Lungs: Diminished. Occ cough. No wheezes.  Cardiovascular: Regular rate and rhythm, normal S1, S2.  Back: No spinal or CVA tenderness.  Abdomen: GJ tube. Soft, no tenderness on exam. Bowel sounds present. No guarding rebound or rigidity.  : Deferred.  Extremities: No edema is noted.  Musculoskeletal: Age related degen changes. Weak left side.  Skin: Warm and dry.  Psych: Mood appears pretty good.      Labs:  Component      Latest Ref Rng & Units 8/15/2018 8/19/2018 8/20/2018 8/21/2018   GFR MDRD Non Af Amer      >60 mL/min/1.73m2 26 (L) 32 (L) 33 (L) 36 (L)   GFR MDRD Af Amer      >60 mL/min/1.73m2 32 (L) 38 (L) 40 (L) 44 (L)   Sodium      136 - 145 mmol/L 140 142 145 142   Anion Gap, Calculation      5 - 18 mmol/L 12 9 9 10   Glucose      70 - 125 mg/dL 110 115 104 131 (H)   Potassium      3.5 - 5.0 mmol/L 3.8 3.4 (L) 4.1 3.9   BUN      8 - 22 mg/dL 29 (H) 9 5 (L) 5 (L)   Calcium      8.5 - 10.5 mg/dL 9.6 8.5 8.7 8.5   Chloride      98 - 107 mmol/L 105 111 (H) 115 (H) 114 (H)   Creatinine      0.60 - 1.10 mg/dL 1.90 (H) 1.62 (H) 1.55 (H) 1.44 (H)   CO2      22 -  31 mmol/L 23 22 21 (L) 18 (L)     Component      Latest Ref Rng & Units 8/22/2018 8/24/2018   GFR MDRD Non Af Amer      >60 mL/min/1.73m2 41 (L) 43 (L)   GFR MDRD Af Amer      >60 mL/min/1.73m2 49 (L) 52 (L)   Sodium      136 - 145 mmol/L 140 142   Anion Gap, Calculation      5 - 18 mmol/L 9 10   Glucose      70 - 125 mg/dL 127 (H) 113   Potassium      3.5 - 5.0 mmol/L 3.7 4.4   BUN      8 - 22 mg/dL 9 19   Calcium      8.5 - 10.5 mg/dL 9.1 9.1   Chloride      98 - 107 mmol/L 110 (H) 109 (H)   Creatinine      0.60 - 1.10 mg/dL 1.30 (H) 1.24 (H)   CO2      22 - 31 mmol/L 21 (L) 23     Component      Latest Ref Rng & Units 8/15/2018 8/19/2018 8/20/2018 8/24/2018   WBC      4.0 - 11.0 thou/uL 9.1 4.9 4.8 8.2   RBC      3.80 - 5.40 mill/uL 4.83 4.03 3.97 4.20   Hemoglobin      12.0 - 16.0 g/dL 12.3 10.1 (L) 10.0 (L) 10.7 (L)   Hematocrit      35.0 - 47.0 % 36.9 30.9 (L) 31.0 (L) 33.0 (L)   MCV      80 - 100 fL 76 (L) 77 (L) 78 (L) 79 (L)   MCH      27.0 - 34.0 pg 25.5 (L) 25.1 (L) 25.2 (L) 25.5 (L)   MCHC      32.0 - 36.0 g/dL 33.3 32.7 32.3 32.4   RDW      11.0 - 14.5 % 14.9 (H) 14.6 (H) 14.7 (H) 15.8 (H)   Platelets      140 - 440 thou/uL 183 159 155 192   MPV      8.5 - 12.5 fL 13.0 (H) 12.7 (H) 12.0 11.8       Assessment/Plan:  1. Aspiration pneumonitis. Completed antibiotics in the hospital. No fever.   2. Hx of stroke. Residual left sided weakness, does not usually use cane or walker. Continue in therapy.   3. Dysphagia. NPO with feeding tube. Continue with speech therapy.   4. HTN. Meds adjusted in the hospital. Currently on Lisinopril, may need to decrease.  5. CKD. Labs as noted above.  6. Anemia. Monitor periodically.        Electronically signed by: Tamika Hair MD

## 2021-06-20 NOTE — PROGRESS NOTES
[facility]    Facility:   [facility] [[id number]]   Code Status: FULL CODE       Chief Complaint   Patient presents with     Follow Up     TCU 10/4/18.       HPI (from TCU H & P 8/30/18):   Bing is a 68 y.o. female with hx of stroke and left sided weakness, admitted to the hospital on 8/15/18 for aspiration pneumonitis. Her discharge summary is partially excerpted below.    Bing Rodrigues is a 68 y.o. female with past history of stroke with left partial hemiparesis and dysphagia, chronic kidney disease stage III, hypertension, microcytic anemia admitted for aspiration pneumonitis secondary to dysphagia.:      Aspiration pneumonitis secondary to chronic dysphagia-patient was febrile on admission PNA 2/2 chronic dysphagia:  No further fevers. Completed 7 days of zosyn -fever and cough resolved.  Now n.p.o.      Dysphagia: No new stroke seen on MRI. plan to give 3 month trial of TF prior to determining if swallow function will return. Will need to continue SLP.      Malnutrition-: GJ placed 8/20. Did not tolerate rate advancement overnight x2.  Switched to Peptamen 1.5 at rate of 30 mL an hour and she seems to be tolerating this, advanced toe 40 mL, did well....  - will need T-fastener removed about 9/3      Abdominal pain after PEG: PEG tube site well appearing . Suspect was either due to the higher rate or abdominal wall pain from the insertion. Has resolved.      Diarrhea: Improving with Benefiber.  Negative C. difficile.  Only occurring once a day.  Imodium low-dose as needed.  No abdominal distention or pain.      Weakness: 2/2 PNA?-Improved.      Hx CVA: continue ASA (via tube).  Has partial left hemiparesis from previous stroke with chronic dysphagia.      CKD 3-stable.      Essential hypertension-patient's hydrochlorothiazide and calcium channel blocker have been held due to mild bradycardia.  Her blood pressure is well controlled on lisinopril.:      Renal  mass: Patient was found to have a cyst on CT, renal ultrasound confirmed that it was a 4.4 cm simple cyst. .      Lung nodule: Patient was found to have a benign appearing left 4 mm lung nodule, she has no past history of tobacco use.  Recommend she discuss further follow-up with her primary physician after discharge.  Fleischner guidelines indicate for a 4 mm solitary lung nodule, and a low risk individual no further follow-up is needed.  One option if her primary physician is not comfortable managing would be follow-up with the lung nodule clinic.  4      Metabolic acidosis / Hyperchloremia: secondary to IVF, resolved.      Anemia: microcytic for last year, recent baseline around 13. 12.3, decreased likely secondary to IV fluid, stable at 10.7.  Recommend outpatient evaluation and recheck.      Overall stabilized and discharged to TCU on 8/25/18 for PT, OT, nursing cares, medical management and monitoring.       Past Medical History:  Past Medical History:   Diagnosis Date     Hypertension      Stroke (H)        Medications:  Current Outpatient Prescriptions   Medication Sig     acetaminophen (TYLENOL) 500 mg/15.62 mL solution 19.99 mL (640 mg total) by G-tube route every 4 (four) hours as needed for fever or pain.     aspirin 81 mg chewable tablet 1 tablet (81 mg total) by G-tube route daily.     calcium, as carbonate, (TUMS) 200 mg calcium (500 mg) chewable tablet 2 tablets (400 mg total) by G-tube route 4 (four) times a day as needed for heartburn.     cholecalciferol, vitamin D3, 1,000 unit tablet 1 tablet (1,000 Units total) by G-tube route daily.     guar gum (BENEFIBER;Maestrano) Pack powder packet 2 packets by Enteral Tube route 2 (two) times a day.     lisinopril (PRINIVIL,ZESTRIL) 40 MG tablet 1 tablet (40 mg total) by G-tube route daily. (Patient taking differently: 10 mg by G-tube route daily. )     loperamide (IMODIUM) 1 mg/5 mL solution 10 mL (2 mg total) by G-tube route daily as needed for  diarrhea.     melatonin 3 mg Tab tablet 1 tablet (3 mg total) by Enteral Tube route at bedtime as needed.     omeprazole (PRILOSEC) 2 mg/mL SusR suspension 10 mL (20 mg total) by Enteral Tube route daily before breakfast.       Today:  NPO on tube feeding. She is adamant about wanting a swallow study earlier than 3 months, will looks at setting one up soon. She has a cough but denies congestion. No fever. Working with speech therapy. As noted previously, was treated in the hospital for aspiration pneumonitis. She denies any abdominal pain, tube feeding is functioning fine. No shortness of breath. Has significant gait instability. BPs acceptable, lisinopril dose had been decreased due to low BPs.      Physical Exam:   General: Patient is alert female, no distress. Dysarthric voice.  Vitals: Reviewed.  HEENT: Head is NCAT. Eyes show no injection or icterus. Nares negative. Oropharynx well hydrated.  Neck: Supple. No tenderness or adenopathy. No JVD.  Lungs: Diminished. Occ cough. No wheezes.  Cardiovascular: Regular rate and rhythm, normal S1, S2.  Back: No spinal or CVA tenderness.  Abdomen: GJ tube. Soft, no tenderness on exam. Bowel sounds present. No guarding rebound or rigidity.  Extremities: No edema is noted.  Musculoskeletal: Age related degen changes. Weak left side.      Labs:  Component      Latest Ref Rng & Units 8/15/2018 8/19/2018 8/20/2018 8/21/2018   GFR MDRD Non Af Amer      >60 mL/min/1.73m2 26 (L) 32 (L) 33 (L) 36 (L)   GFR MDRD Af Amer      >60 mL/min/1.73m2 32 (L) 38 (L) 40 (L) 44 (L)   Sodium      136 - 145 mmol/L 140 142 145 142   Anion Gap, Calculation      5 - 18 mmol/L 12 9 9 10   Glucose      70 - 125 mg/dL 110 115 104 131 (H)   Potassium      3.5 - 5.0 mmol/L 3.8 3.4 (L) 4.1 3.9   BUN      8 - 22 mg/dL 29 (H) 9 5 (L) 5 (L)   Calcium      8.5 - 10.5 mg/dL 9.6 8.5 8.7 8.5   Chloride      98 - 107 mmol/L 105 111 (H) 115 (H) 114 (H)   Creatinine      0.60 - 1.10 mg/dL 1.90 (H) 1.62 (H) 1.55 (H)  1.44 (H)   CO2      22 - 31 mmol/L 23 22 21 (L) 18 (L)     Component      Latest Ref Rng & Units 8/22/2018 8/24/2018   GFR MDRD Non Af Amer      >60 mL/min/1.73m2 41 (L) 43 (L)   GFR MDRD Af Amer      >60 mL/min/1.73m2 49 (L) 52 (L)   Sodium      136 - 145 mmol/L 140 142   Anion Gap, Calculation      5 - 18 mmol/L 9 10   Glucose      70 - 125 mg/dL 127 (H) 113   Potassium      3.5 - 5.0 mmol/L 3.7 4.4   BUN      8 - 22 mg/dL 9 19   Calcium      8.5 - 10.5 mg/dL 9.1 9.1   Chloride      98 - 107 mmol/L 110 (H) 109 (H)   Creatinine      0.60 - 1.10 mg/dL 1.30 (H) 1.24 (H)   CO2      22 - 31 mmol/L 21 (L) 23     Component      Latest Ref Rng & Units 8/15/2018 8/19/2018 8/20/2018 8/24/2018   WBC      4.0 - 11.0 thou/uL 9.1 4.9 4.8 8.2   RBC      3.80 - 5.40 mill/uL 4.83 4.03 3.97 4.20   Hemoglobin      12.0 - 16.0 g/dL 12.3 10.1 (L) 10.0 (L) 10.7 (L)   Hematocrit      35.0 - 47.0 % 36.9 30.9 (L) 31.0 (L) 33.0 (L)   MCV      80 - 100 fL 76 (L) 77 (L) 78 (L) 79 (L)   MCH      27.0 - 34.0 pg 25.5 (L) 25.1 (L) 25.2 (L) 25.5 (L)   MCHC      32.0 - 36.0 g/dL 33.3 32.7 32.3 32.4   RDW      11.0 - 14.5 % 14.9 (H) 14.6 (H) 14.7 (H) 15.8 (H)   Platelets      140 - 440 thou/uL 183 159 155 192   MPV      8.5 - 12.5 fL 13.0 (H) 12.7 (H) 12.0 11.8       Results for orders placed or performed in visit on 08/27/18   Basic Metabolic Panel   Result Value Ref Range    Sodium 140 136 - 145 mmol/L    Potassium 4.7 3.5 - 5.0 mmol/L    Chloride 108 (H) 98 - 107 mmol/L    CO2 23 22 - 31 mmol/L    Anion Gap, Calculation 9 5 - 18 mmol/L    Glucose 110 70 - 125 mg/dL    Calcium 9.2 8.5 - 10.5 mg/dL    BUN 28 (H) 8 - 22 mg/dL    Creatinine 1.11 (H) 0.60 - 1.10 mg/dL    GFR MDRD Af Amer 59 (L) >60 mL/min/1.73m2    GFR MDRD Non Af Amer 49 (L) >60 mL/min/1.73m2     Lab Results   Component Value Date    HGB 10.8 (L) 08/27/2018       Assessment/Plan:  1. Dysphagia. NPO on tube feeding. Plan was for 3 month trial of tube feed and further eval. Will  schedule swallow study soon.   2. Hx of stroke. Residual left sided weakness. Continue with therapy.   3. HTN. Continue lisinopril. Bps acceptable.   4. CKD. Labs as noted.  5. Anemia. Stable. Last hgb at 10.8.  6. Aspiration pneumonitis. Completed abx, No fever. Monitor.        Electronically signed by: Tamika Hair MD

## 2021-06-20 NOTE — PROGRESS NOTES
[facility]    Facility:   [facility] [[id number]]   Code Status: FULL CODE       Chief Complaint   Patient presents with     Follow Up     TCU 9/13/18.         HPI (from TCU H & P 8/30/18):   Bing is a 68 y.o. female with hx of stroke and left sided weakness, admitted to the hospital on 8/15/18 for aspiration pneumonitis. Her discharge summary is partially excerpted below.    Bing Rodrigues is a 68 y.o. female with past history of stroke with left partial hemiparesis and dysphagia, chronic kidney disease stage III, hypertension, microcytic anemia admitted for aspiration pneumonitis secondary to dysphagia.:      Aspiration pneumonitis secondary to chronic dysphagia-patient was febrile on admission PNA 2/2 chronic dysphagia:  No further fevers. Completed 7 days of zosyn -fever and cough resolved.  Now n.p.o.      Dysphagia: No new stroke seen on MRI. plan to give 3 month trial of TF prior to determining if swallow function will return. Will need to continue SLP.      Malnutrition-: GJ placed 8/20. Did not tolerate rate advancement overnight x2.  Switched to Peptamen 1.5 at rate of 30 mL an hour and she seems to be tolerating this, advanced toe 40 mL, did well....  - will need T-fastener removed about 9/3      Abdominal pain after PEG: PEG tube site well appearing . Suspect was either due to the higher rate or abdominal wall pain from the insertion. Has resolved.      Diarrhea: Improving with Benefiber.  Negative C. difficile.  Only occurring once a day.  Imodium low-dose as needed.  No abdominal distention or pain.      Weakness: 2/2 PNA?-Improved.      Hx CVA: continue ASA (via tube).  Has partial left hemiparesis from previous stroke with chronic dysphagia.      CKD 3-stable.      Essential hypertension-patient's hydrochlorothiazide and calcium channel blocker have been held due to mild bradycardia.  Her blood pressure is well controlled on lisinopril.:      Renal  mass: Patient was found to have a cyst on CT, renal ultrasound confirmed that it was a 4.4 cm simple cyst. .      Lung nodule: Patient was found to have a benign appearing left 4 mm lung nodule, she has no past history of tobacco use.  Recommend she discuss further follow-up with her primary physician after discharge.  Fleischner guidelines indicate for a 4 mm solitary lung nodule, and a low risk individual no further follow-up is needed.  One option if her primary physician is not comfortable managing would be follow-up with the lung nodule clinic.  4      Metabolic acidosis / Hyperchloremia: secondary to IVF, resolved.      Anemia: microcytic for last year, recent baseline around 13. 12.3, decreased likely secondary to IV fluid, stable at 10.7.  Recommend outpatient evaluation and recheck.      Overall stabilized and discharged to TCU on 8/25/18 for PT, OT, nursing cares, medical management and monitoring.       Past Medical History:  Past Medical History:   Diagnosis Date     Hypertension      Stroke (H)        Medications:  Current Outpatient Prescriptions   Medication Sig     acetaminophen (TYLENOL) 500 mg/15.62 mL solution 19.99 mL (640 mg total) by G-tube route every 4 (four) hours as needed for fever or pain.     aspirin 81 mg chewable tablet 1 tablet (81 mg total) by G-tube route daily.     calcium, as carbonate, (TUMS) 200 mg calcium (500 mg) chewable tablet 2 tablets (400 mg total) by G-tube route 4 (four) times a day as needed for heartburn.     cholecalciferol, vitamin D3, 1,000 unit tablet 1 tablet (1,000 Units total) by G-tube route daily.     guar gum (BENEFIBER;[employer]) Pack powder packet 2 packets by Enteral Tube route 2 (two) times a day.     lisinopril (PRINIVIL,ZESTRIL) 40 MG tablet 1 tablet (40 mg total) by G-tube route daily.     loperamide (IMODIUM) 1 mg/5 mL solution 10 mL (2 mg total) by G-tube route daily as needed for diarrhea.     melatonin 3 mg Tab tablet 1 tablet (3 mg total) by  Enteral Tube route at bedtime as needed.     omeprazole (PRILOSEC) 2 mg/mL SusR suspension 10 mL (20 mg total) by Enteral Tube route daily before breakfast.       Today:  No interim concerns. Continues on TF, NPO status. Working with speech therapy. Chronic occ cough that preceded hospitalization, Treated for aspiration pneumonia, not on abx any longer. No fever. No abdominal pain. No shortness of breath though tires easily with therapy. States her family is looking into having her move to [place] after TCU. BPs low, lisinopril was reduced.      Physical Exam:   General: Patient is alert female, no distress. Dysarthric voice.  Vitals: /69, Temp 97.9, Pulse 62, RR 18, O2 sat 97% RA.  HEENT: Head is NCAT. Eyes show no injection or icterus. Nares negative. Oropharynx well hydrated.  Neck: Supple. No tenderness or adenopathy. No JVD.  Lungs: Diminished. Occ cough. No wheezes.  Cardiovascular: Regular rate and rhythm, normal S1, S2.  Back: No spinal or CVA tenderness.  Abdomen: GJ tube. Soft, no tenderness on exam. Bowel sounds present. No guarding rebound or rigidity.  : Deferred.  Extremities: No edema is noted.  Musculoskeletal: Age related degen changes. Weak left side.  Psych: Mood appears pretty good.      Labs:  Component      Latest Ref Rng & Units 8/15/2018 8/19/2018 8/20/2018 8/21/2018   GFR MDRD Non Af Amer      >60 mL/min/1.73m2 26 (L) 32 (L) 33 (L) 36 (L)   GFR MDRD Af Amer      >60 mL/min/1.73m2 32 (L) 38 (L) 40 (L) 44 (L)   Sodium      136 - 145 mmol/L 140 142 145 142   Anion Gap, Calculation      5 - 18 mmol/L 12 9 9 10   Glucose      70 - 125 mg/dL 110 115 104 131 (H)   Potassium      3.5 - 5.0 mmol/L 3.8 3.4 (L) 4.1 3.9   BUN      8 - 22 mg/dL 29 (H) 9 5 (L) 5 (L)   Calcium      8.5 - 10.5 mg/dL 9.6 8.5 8.7 8.5   Chloride      98 - 107 mmol/L 105 111 (H) 115 (H) 114 (H)   Creatinine      0.60 - 1.10 mg/dL 1.90 (H) 1.62 (H) 1.55 (H) 1.44 (H)   CO2      22 - 31 mmol/L 23 22 21 (L) 18 (L)      Component      Latest Ref Rng & Units 8/22/2018 8/24/2018   GFR MDRD Non Af Amer      >60 mL/min/1.73m2 41 (L) 43 (L)   GFR MDRD Af Amer      >60 mL/min/1.73m2 49 (L) 52 (L)   Sodium      136 - 145 mmol/L 140 142   Anion Gap, Calculation      5 - 18 mmol/L 9 10   Glucose      70 - 125 mg/dL 127 (H) 113   Potassium      3.5 - 5.0 mmol/L 3.7 4.4   BUN      8 - 22 mg/dL 9 19   Calcium      8.5 - 10.5 mg/dL 9.1 9.1   Chloride      98 - 107 mmol/L 110 (H) 109 (H)   Creatinine      0.60 - 1.10 mg/dL 1.30 (H) 1.24 (H)   CO2      22 - 31 mmol/L 21 (L) 23     Component      Latest Ref Rng & Units 8/15/2018 8/19/2018 8/20/2018 8/24/2018   WBC      4.0 - 11.0 thou/uL 9.1 4.9 4.8 8.2   RBC      3.80 - 5.40 mill/uL 4.83 4.03 3.97 4.20   Hemoglobin      12.0 - 16.0 g/dL 12.3 10.1 (L) 10.0 (L) 10.7 (L)   Hematocrit      35.0 - 47.0 % 36.9 30.9 (L) 31.0 (L) 33.0 (L)   MCV      80 - 100 fL 76 (L) 77 (L) 78 (L) 79 (L)   MCH      27.0 - 34.0 pg 25.5 (L) 25.1 (L) 25.2 (L) 25.5 (L)   MCHC      32.0 - 36.0 g/dL 33.3 32.7 32.3 32.4   RDW      11.0 - 14.5 % 14.9 (H) 14.6 (H) 14.7 (H) 15.8 (H)   Platelets      140 - 440 thou/uL 183 159 155 192   MPV      8.5 - 12.5 fL 13.0 (H) 12.7 (H) 12.0 11.8       Assessment/Plan:  1. Dysphagia. NPO on tube feeding.   2. Hx of stroke. Residual left sided weakness, does not usually use cane or walker. Continue in therapy for strengthening.   3. HTN. On lisinopril, dose reduced. Cont to monitor.   4. CKD. Labs as noted above.  5. Anemia. Monitor periodically.  6. Aspiration pneumonitis. Completed abx, No fever.        Electronically signed by: Tamika Hair MD

## 2021-06-20 NOTE — PROGRESS NOTES
[facility] Discharge Summary  Patient Name: Bing Rodrigues  Date: 9/4/2018  Referral Diagnosis: ataxic gaity  Referring provider: Isabel Barrios MD  Visit Diagnosis:   1. Ataxic Gait     2. Generalized muscle weakness     3. History of CVA (cerebrovascular accident)     4. Benign Essential Hypertension     5. Chronic Kidney Disease, Stage 3         Goals:not met  Pt. will demonstrate/verbalize independence in self-management of condition in : 6 weeks;Progressing toward  Pt. will be independent with home exercise program in : 6 weeks;Progressing toward  Pt. will show improved balance for safer : sitting;ambulation;standing;for dressing/grooming;for household ambulation;for chores;independently;in 6 weeks;Progressing toward  Pt. will be able to walk : 10 minutes;with FWB;around the house;in 6 weeks;Progressing toward  No Data Recorded    Patient was seen for 6 visits from 7-10-18 to 8-13-18 with 0 missed appointments.  Patient was admitted to hospital.    Therapy will be discontinued at this time.  The patient will need a new referral to resume.    Thank you for your referral.  Indigo Ch PT  9/4/2018  10:51 AM

## 2021-06-21 NOTE — LETTER
Letter by Binta Wheeler MD at      Author: Binta Wheeler MD Service: -- Author Type: --    Filed:  Encounter Date: 12/29/2020 Status: (Other)         December 29, 2020     Patient: Bing Rodrigues   YOB: 1950           Order: nut.tx.impaired digest fxn (PEPTAMEN 1.5) 0.068 gram- 1.5 kcal/mL Liqd  Directions: Take 1 Bottle by mouth 4 (four) times a day. Peptamen 1.5 at 40 ml/hr for 22 hours then off for 2 hours.  Total of 520 CC over 24 hours.             Binta Wheeler: ___________________________________________ Date: _____________________  NPI: [id number]       
Yes

## 2021-06-21 NOTE — PROGRESS NOTES
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     Joint Pain Fingers     Closed Fracture Of The Distal Phalanx Of The Left Third Finger     CAP (community acquired pneumonia)     Fall, initial encounter     Special screening for malignant neoplasms, colon     Diaphragmatic hernia     Diaphragmatic hernia without obstruction or gangrene     Dysphagia     Esophageal obstruction     Esophagitis     Gastro-esophageal reflux disease with esophagitis     Gastroesophageal reflux disease with esophagitis     Gastroesophageal reflux disease without esophagitis     Gastro-esophageal reflux disease without esophagitis     Obstruction of esophagus     Oropharyngeal dysphagia     Aspiration pneumonitis (H)     Aspiration pneumonia (H)     Hypomagnesemia     Lung nodule     Renal cyst       Onset date: 8/15/18  Reason for evaluation: re-assess swallow function  Pertinent History: Recent hospital stay due to aspiration pneumonia, hx multiple CVA's. Last swallow study completed on 8/16/18 with results of mod -severe oral and pharyngeal dysphagia, silent aspiration of honey thick liquid.   Current Diet: G tube nutrition  Baseline Diet: soft foods and thin liquids per patient report prior to August hospitalization.    Patient presents as alert and cooperative during this evaluation.   An  was not applicable    Patient was given 3 trials of honey thick barium by spoon and cup. Due to amount aspirated and lack of airway clearance other consistencies  were not trialed.     Oral Phase:    Dentition/Oral hygiene: Patient wears upper partial dentures. She is missing some upper front teeth and most lower molars. Speech is dysarthric.    Bolus prep and oral control was moderately impaired. She demonstrated prolonged oral phase attempting to propel bolus posteriorly with persistent tongue pumping.     Anterior-Posterior transit was moderate to severely impaired .    There was mild Premature spillage over the tongue back consistently with honey thick.    Tongue base retraction was mildly impaired.    Oral stasis did not occur with any texture.    Pharyngeal Phase:    Tavo aspiration occurred with honey thick in 2 of 3 trials. Patient had initially no cough response to aspiration on first trial, cued to cough, then significantly delayed weak cough when second aspiration occurred.  Aspiration occurred with 1 tsp trial by spoon and when taking approximately 1/2 tsp sip by cup. Amount aspirated appeared larger than what was observed on previous VFSS in August.    Laryngeal penetration occurred with 1 of 3 trials of honey thick.     Swallow response was delayed with honey thick. The head of the bolus appeared just ahead of the epiglottic movement resulting in aspiration during the swallow.    Epiglottic movement was complete consistently across texture trials.    Pharyngeal stasis did not occur with honey thick. This is an improvement from previous study.    Pharyngeal constriction was not impaired.    Hyolaryngeal elevation was slow and reduced. Hyolaryngeal excursion was slow and reduced.    Cricopharyngeal function was not impaired. Cervical esophageal function was not impaired.    Assessment:    Patient demonstrated moderate oral and moderate pharyngeal dysphagia.    Patient is at high aspiration risk with all intake. Also at risk for aspiration related illness due to lack of cough response and ability to clear airway with aspiration.    While continuing to demonstrate  impaired swallow, there does appear to be some improvement in overall pharyngeal swallow function since previous study 8 weeks ago.    Rehab potential is good based on evaluation results and motivation and cooperation.    Recommendations:    Plan: NPO    Strategies: Consider trials of ice chips with speech pathologist to facilitate oral pharyngeal function and comfort. Also consider dysphagia treatment with speech pathologist if not already implemented, she may benefit from hyolaryngeal/pharyngeal exercises.    Speech therapy is recommended for additional 6-8 weeks prior to repeated video swallow study.    33 dysphagia minutes    Gene Cortez MA, CCC-SLP

## 2021-06-21 NOTE — PROGRESS NOTES
[facility]    Facility:   [facility] [[id number]]   Code Status: FULL CODE       Chief Complaint   Patient presents with     Follow Up     TCU 10/16/18.       HPI (from TCU H & P 8/30/18):   Bing is a 68 y.o. female with hx of stroke and left sided weakness, admitted to the hospital on 8/15/18 for aspiration pneumonitis. Her discharge summary is partially excerpted below.    Bing Rodrigues is a 68 y.o. female with past history of stroke with left partial hemiparesis and dysphagia, chronic kidney disease stage III, hypertension, microcytic anemia admitted for aspiration pneumonitis secondary to dysphagia.:      Aspiration pneumonitis secondary to chronic dysphagia-patient was febrile on admission PNA 2/2 chronic dysphagia:  No further fevers. Completed 7 days of zosyn -fever and cough resolved.  Now n.p.o.      Dysphagia: No new stroke seen on MRI. plan to give 3 month trial of TF prior to determining if swallow function will return. Will need to continue SLP.      Malnutrition-: GJ placed 8/20. Did not tolerate rate advancement overnight x2.  Switched to Peptamen 1.5 at rate of 30 mL an hour and she seems to be tolerating this, advanced toe 40 mL, did well....  - will need T-fastener removed about 9/3      Abdominal pain after PEG: PEG tube site well appearing . Suspect was either due to the higher rate or abdominal wall pain from the insertion. Has resolved.      Diarrhea: Improving with Benefiber.  Negative C. difficile.  Only occurring once a day.  Imodium low-dose as needed.  No abdominal distention or pain.      Weakness: 2/2 PNA?-Improved.      Hx CVA: continue ASA (via tube).  Has partial left hemiparesis from previous stroke with chronic dysphagia.      CKD 3-stable.      Essential hypertension-patient's hydrochlorothiazide and calcium channel blocker have been held due to mild bradycardia.  Her blood pressure is well controlled on lisinopril.:      Renal  mass: Patient was found to have a cyst on CT, renal ultrasound confirmed that it was a 4.4 cm simple cyst. .      Lung nodule: Patient was found to have a benign appearing left 4 mm lung nodule, she has no past history of tobacco use.  Recommend she discuss further follow-up with her primary physician after discharge.  Fleischner guidelines indicate for a 4 mm solitary lung nodule, and a low risk individual no further follow-up is needed.  One option if her primary physician is not comfortable managing would be follow-up with the lung nodule clinic.  4      Metabolic acidosis / Hyperchloremia: secondary to IVF, resolved.      Anemia: microcytic for last year, recent baseline around 13. 12.3, decreased likely secondary to IV fluid, stable at 10.7.  Recommend outpatient evaluation and recheck.      Overall stabilized and discharged to TCU on 8/25/18 for PT, OT, nursing cares, medical management and monitoring.       Past Medical History:  Past Medical History:   Diagnosis Date     Hypertension      Stroke (H)        Medications:  Current Outpatient Prescriptions   Medication Sig     acetaminophen (TYLENOL) 500 mg/15.62 mL solution 19.99 mL (640 mg total) by G-tube route every 4 (four) hours as needed for fever or pain.     aspirin 81 mg chewable tablet 1 tablet (81 mg total) by G-tube route daily.     calcium, as carbonate, (TUMS) 200 mg calcium (500 mg) chewable tablet 2 tablets (400 mg total) by G-tube route 4 (four) times a day as needed for heartburn.     cholecalciferol, vitamin D3, 1,000 unit tablet 1 tablet (1,000 Units total) by G-tube route daily.     guar gum (BENEFIBER;[employer]) Pack powder packet 2 packets by Enteral Tube route 2 (two) times a day.     lisinopril (PRINIVIL,ZESTRIL) 40 MG tablet 1 tablet (40 mg total) by G-tube route daily. (Patient taking differently: 10 mg by G-tube route daily. )     loperamide (IMODIUM) 1 mg/5 mL solution 10 mL (2 mg total) by G-tube route daily as needed for  diarrhea.     melatonin 3 mg Tab tablet 1 tablet (3 mg total) by Enteral Tube route at bedtime as needed.     omeprazole (PRILOSEC) 2 mg/mL SusR suspension 10 mL (20 mg total) by Enteral Tube route daily before breakfast.       Today:  NPO on tube feeding. She recently had repeat swallow study that shows chriss aspiration so feeding tube will be continued. BPs run low side, has meds with parameters. No change in clinical status. Continues to state she wants to walk without walker yet demonstrates gait instability even with the walker. She denies any abdominal pain, tube feeding is functioning fine. No shortness of breath.       Physical Exam:   General: Patient is alert female, no distress. Dysarthric voice.  Vitals: /64, Temp 97.7, Pulse 62, RR 18, O2 sat 91% RA.  HEENT: Head is NCAT. Eyes show no injection or icterus. Nares negative. Oropharynx well hydrated.  Neck: Supple. No tenderness or adenopathy. No JVD.  Lungs: Diminished. Occ cough. No wheezes.  Cardiovascular: Regular rate and rhythm, normal S1, S2.  Back: No spinal or CVA tenderness.  Abdomen: GJ tube. Soft, no tenderness on exam. Bowel sounds present. No guarding rebound or rigidity.  Extremities: No edema is noted.  Musculoskeletal: Age related degen changes. Weak left side.      Labs:  Component      Latest Ref Rng & Units 8/15/2018 8/19/2018 8/20/2018 8/21/2018   GFR MDRD Non Af Amer      >60 mL/min/1.73m2 26 (L) 32 (L) 33 (L) 36 (L)   GFR MDRD Af Amer      >60 mL/min/1.73m2 32 (L) 38 (L) 40 (L) 44 (L)   Sodium      136 - 145 mmol/L 140 142 145 142   Anion Gap, Calculation      5 - 18 mmol/L 12 9 9 10   Glucose      70 - 125 mg/dL 110 115 104 131 (H)   Potassium      3.5 - 5.0 mmol/L 3.8 3.4 (L) 4.1 3.9   BUN      8 - 22 mg/dL 29 (H) 9 5 (L) 5 (L)   Calcium      8.5 - 10.5 mg/dL 9.6 8.5 8.7 8.5   Chloride      98 - 107 mmol/L 105 111 (H) 115 (H) 114 (H)   Creatinine      0.60 - 1.10 mg/dL 1.90 (H) 1.62 (H) 1.55 (H) 1.44 (H)   CO2      22 - 31  mmol/L 23 22 21 (L) 18 (L)     Component      Latest Ref Rng & Units 8/22/2018 8/24/2018   GFR MDRD Non Af Amer      >60 mL/min/1.73m2 41 (L) 43 (L)   GFR MDRD Af Amer      >60 mL/min/1.73m2 49 (L) 52 (L)   Sodium      136 - 145 mmol/L 140 142   Anion Gap, Calculation      5 - 18 mmol/L 9 10   Glucose      70 - 125 mg/dL 127 (H) 113   Potassium      3.5 - 5.0 mmol/L 3.7 4.4   BUN      8 - 22 mg/dL 9 19   Calcium      8.5 - 10.5 mg/dL 9.1 9.1   Chloride      98 - 107 mmol/L 110 (H) 109 (H)   Creatinine      0.60 - 1.10 mg/dL 1.30 (H) 1.24 (H)   CO2      22 - 31 mmol/L 21 (L) 23     Component      Latest Ref Rng & Units 8/15/2018 8/19/2018 8/20/2018 8/24/2018   WBC      4.0 - 11.0 thou/uL 9.1 4.9 4.8 8.2   RBC      3.80 - 5.40 mill/uL 4.83 4.03 3.97 4.20   Hemoglobin      12.0 - 16.0 g/dL 12.3 10.1 (L) 10.0 (L) 10.7 (L)   Hematocrit      35.0 - 47.0 % 36.9 30.9 (L) 31.0 (L) 33.0 (L)   MCV      80 - 100 fL 76 (L) 77 (L) 78 (L) 79 (L)   MCH      27.0 - 34.0 pg 25.5 (L) 25.1 (L) 25.2 (L) 25.5 (L)   MCHC      32.0 - 36.0 g/dL 33.3 32.7 32.3 32.4   RDW      11.0 - 14.5 % 14.9 (H) 14.6 (H) 14.7 (H) 15.8 (H)   Platelets      140 - 440 thou/uL 183 159 155 192   MPV      8.5 - 12.5 fL 13.0 (H) 12.7 (H) 12.0 11.8       Results for orders placed or performed in visit on 08/27/18   Basic Metabolic Panel   Result Value Ref Range    Sodium 140 136 - 145 mmol/L    Potassium 4.7 3.5 - 5.0 mmol/L    Chloride 108 (H) 98 - 107 mmol/L    CO2 23 22 - 31 mmol/L    Anion Gap, Calculation 9 5 - 18 mmol/L    Glucose 110 70 - 125 mg/dL    Calcium 9.2 8.5 - 10.5 mg/dL    BUN 28 (H) 8 - 22 mg/dL    Creatinine 1.11 (H) 0.60 - 1.10 mg/dL    GFR MDRD Af Amer 59 (L) >60 mL/min/1.73m2    GFR MDRD Non Af Amer 49 (L) >60 mL/min/1.73m2     Lab Results   Component Value Date    HGB 10.8 (L) 08/27/2018       Assessment/Plan:  1. Dysphagia. NPO on tube feeding. Repeat swallow study shows aspiration. Continue tube feed.   2. Hx of stroke. Residual left  sided weakness. Continue with therapy.   3. HTN. Continue lisinopril. Monitor BPs, hold parameters..   4. CKD. Labs as noted.  5. Anemia. Stable. Last hgb at 10.8.  6. Aspiration pneumonitis. Completed abx. No fever. Monitor.        Electronically signed by: Tamika Hair MD

## 2021-06-21 NOTE — PROGRESS NOTES
[facility]    Facility:   [facility] [[id number]]   Code Status: FULL CODE  PCP: Isabel Barrios MD   Phone: [phone]   Fax: [phone]      CHIEF COMPLAINT/REASON FOR VISIT:  Chief Complaint   Patient presents with     Discharge Summary     [facility] TCU 8/25/18-10/25/18       HISTORY COURSE:    HPI (from TCU H & P 8/30/18):   Bing is a 68 y.o. female with hx of stroke and left sided weakness, admitted to the hospital on 8/15/18 for aspiration pneumonitis. Her discharge summary is partially excerpted below.    Bing Rodrigues is a 68 y.o. female with past history of stroke with left partial hemiparesis and dysphagia, chronic kidney disease stage III, hypertension, microcytic anemia admitted for aspiration pneumonitis secondary to dysphagia.:      Aspiration pneumonitis secondary to chronic dysphagia-patient was febrile on admission PNA 2/2 chronic dysphagia:  No further fevers. Completed 7 days of zosyn -fever and cough resolved.  Now n.p.o.      Dysphagia: No new stroke seen on MRI. plan to give 3 month trial of TF prior to determining if swallow function will return. Will need to continue SLP.      Malnutrition-: GJ placed 8/20. Did not tolerate rate advancement overnight x2.  Switched to Peptamen 1.5 at rate of 30 mL an hour and she seems to be tolerating this, advanced toe 40 mL, did well....  - will need T-fastener removed about 9/3      Abdominal pain after PEG: PEG tube site well appearing . Suspect was either due to the higher rate or abdominal wall pain from the insertion. Has resolved.      Diarrhea: Improving with Benefiber.  Negative C. difficile.  Only occurring once a day.  Imodium low-dose as needed.  No abdominal distention or pain.      Weakness: 2/2 PNA?-Improved.      Hx CVA: continue ASA (via tube).  Has partial left hemiparesis from previous stroke with chronic dysphagia.      CKD 3-stable.      Essential hypertension-patient's  hydrochlorothiazide and calcium channel blocker have been held due to mild bradycardia.  Her blood pressure is well controlled on lisinopril.:      Renal mass: Patient was found to have a cyst on CT, renal ultrasound confirmed that it was a 4.4 cm simple cyst. .      Lung nodule: Patient was found to have a benign appearing left 4 mm lung nodule, she has no past history of tobacco use.  Recommend she discuss further follow-up with her primary physician after discharge.  Fleischner guidelines indicate for a 4 mm solitary lung nodule, and a low risk individual no further follow-up is needed.  One option if her primary physician is not comfortable managing would be follow-up with the lung nodule clinic.  4      Metabolic acidosis / Hyperchloremia: secondary to IVF, resolved.      Anemia: microcytic for last year, recent baseline around 13. 12.3, decreased likely secondary to IV fluid, stable at 10.7.  Recommend outpatient evaluation and recheck.      Overall stabilized and discharged to TCU on 8/25/18 for PT, OT, nursing cares, medical management and monitoring.     TCU:  Her TCU course was uncomplicated. It was prolonged due to need for therapy for gait and ambulation and strengthening. She should use a walker but stares she did not previously at home and doesn't really want to. She does exhibit gait instability with weakness from history of stroke. She continued NPO with tube feeding, some off time allowed to participate in therapy. Dietician followed for nutritional and hydration requirements. Her family looked for [facility] since she cannot return home. She had repeat swallow study that showed continue aspiration. She will continue on tube feeding. Her BPs were low and meds adjusted. She has chronic cough that  confirms preexisted hospitalization, actually has been present for months if not years. She will be discharging to [facility] anticipated on Thurs 10/25/18 and will need hospital bed (info below)  and home services for ongoing therapies.       PHYSICAL EXAM:   General: Patient is alert female, no distress. Dysarthric voice.  Vitals: /47, Temp 97.8, Pulse 60, RR 18, O2 sat 95% RA.  HEENT: Head is NCAT. Eyes show no injection or icterus. Nares negative. Oropharynx well hydrated.  Neck: Supple. No tenderness or adenopathy. No JVD.  Lungs: Diminished. No wheezes.  Cardiovascular: Regular rate and rhythm, normal S1, S2.  Back: No spinal or CVA tenderness.  Abdomen: GJ tube. Soft, no tenderness on exam. Bowel sounds present. No guarding rebound or rigidity.  Extremities: No edema is noted.  Musculoskeletal: Age related degen changes. Weak left side.      MEDICATION LIST:  Current Outpatient Prescriptions   Medication Sig     acetaminophen (TYLENOL) 500 mg/15.62 mL solution 19.99 mL (640 mg total) by G-tube route every 4 (four) hours as needed for fever or pain.     aspirin 81 mg chewable tablet 1 tablet (81 mg total) by G-tube route daily.     calcium, as carbonate, (TUMS) 200 mg calcium (500 mg) chewable tablet 2 tablets (400 mg total) by G-tube route 4 (four) times a day as needed for heartburn.     cholecalciferol, vitamin D3, 1,000 unit tablet 1 tablet (1,000 Units total) by G-tube route daily.     guar gum (BENEFIBER;NUTRISOURCE) Pack powder packet 2 packets by Enteral Tube route 2 (two) times a day.     lisinopril (PRINIVIL,ZESTRIL) 40 MG tablet 1 tablet (40 mg total) by G-tube route daily. (Patient taking differently: 10 mg by G-tube route daily. )     loperamide (IMODIUM) 1 mg/5 mL solution 10 mL (2 mg total) by G-tube route daily as needed for diarrhea.     melatonin 3 mg Tab tablet 1 tablet (3 mg total) by Enteral Tube route at bedtime as needed.     omeprazole (PRILOSEC) 2 mg/mL SusR suspension 10 mL (20 mg total) by Enteral Tube route daily before breakfast.       DISCHARGE DIAGNOSIS:  1. Aspiration. Hospitalized with pneumonitis Aug 2018.  2. Hx of stroke. Residual left sided weakness.  3.  Dysphagia. Now has GJ feeding tube. NPO. Continue with speech therapy.   4. HTN. On Lisinopril.   5. CKD.   6. Anemia.   7. Renal cyst. Follow up with PMD.  8. Lung nodule. Follow up with PMD.    Total time greater than 30 minutes discharge coordination.      MEDICAL EQUIPMENT NEEDS:  Hospital bed.    Face to Face to meet the requirements for home medical equipment:    Patients head of the bed must be greater than 30 degrees at all times due to: encounter for attention to gastrostomy, dysphagia, pneumonitis due to inhalation of food and vomit, gastro-esophageal reflux disease without esophagitis, with continuous enteral feeding.     Due to the diagnosis of: encounter for attention to gastrostomy, dysphagia, pneumonitis due to inhalation of food and vomit, gastro-esophageal reflux disease without esophagitis, with continuous enteral feeding, my patient will require and benefit from the use of a fixed height manual hospital bed with bilateral half side rails and a standard mattress for the duration of her lifetime.      DISCHARGE PLAN/FACE TO FACE:  I certify that services are/were furnished while this patient was under the care of a physician and that a physician or an allowed non-physician practitioner (NPP), had a face-to-face encounter that meets the physician face-to-face encounter requirements. The encounter was in whole, or in part, related to the primary reason for home health. The patient is confined to his/her home and needs intermittent skilled nursing, physical therapy, speech-language pathology, or the continued need for occupational therapy. A plan of care has been established by a physician and is periodically reviewed by a physician.    Date of Face-to-Face Encounter: 10/23/18.    I certify that, based on my findings, the following services are medically necessary home health services: PT, OT, HHA, ST, RN.    My clinical findings support the need for the above skilled services because: New tube feeding  with NPO status since hospitalization in August for aspiration pneumonitis. History of stroke, gait instability, dysphagia and weakness. Will be moving to new environment. Needs additional therapy for strengthening, etc, with need for ongoing speech therapy due to dysphagia and NPO status. Aide to assist with cares and nurse for clinical assessments.    This patient is homebound because: Too strenuous to leave the home.     The patient is, or has been, under my care and I have initiated the establishment of the plan of care. This patient will be followed by a physician who will periodically review the plan of care.    Schedule follow up visit with primary care provider within 7 days to reestablish care.    Electronically signed by: Tamika Hair MD

## 2021-06-21 NOTE — LETTER
Letter by Myhre, David J, RN at      Author: Myhre, David J, RN Service: -- Author Type: --    Filed:  Encounter Date: 3/23/2021 Status: (Other)       CARE COORDINATION  [facility]    March 23, 2021    Bing Rodrigues  [address] [address]      Dear Bnig,    I am a clinic care coordinator who works with Binta Wheeler MD at the [facility]. I wanted to thank you for spending the time to talk with me.  Below is a description of clinic care coordination and how I can further assist you.      The clinic care coordination team is made up of a registered nurse,  and community health worker who understand the health care system. The goal of clinic care coordination is to help you manage your health and improve access to the health care system in the most efficient manner. The team can assist you in meeting your health care goals by providing education, coordinating services, strengthening the communication among your providers and supporting you with any resource needs.    Please feel free to contact the Community Health WorkerCarol at [phone] with any questions or concerns. We are focused on providing you with the highest-quality healthcare experience possible and that all starts with you.     Sincerely,     David Myhre, RN  [facility] RN    Enclosed: I have enclosed a copy of the Care Plan. This has helpful information and goals that we have talked about. Please keep this in an easy to access place to use as needed.

## 2021-06-21 NOTE — LETTER
Letter by Binta Wheeler MD at      Author: Binta Wheeler MD Service: -- Author Type: --    Filed:  Encounter Date: 12/11/2020 Status: (Other)         Bing Rodrigues  [address] [address]             December 11, 2020         Dear Ms. Rodrigues,    Below are the results from your recent visit:    Resulted Orders   Comprehensive Metabolic Panel   Result Value Ref Range    Sodium 140 136 - 145 mmol/L    Potassium 4.6 3.5 - 5.0 mmol/L    Chloride 103 98 - 107 mmol/L    CO2 25 22 - 31 mmol/L    Anion Gap, Calculation 12 5 - 18 mmol/L    Glucose 111 70 - 125 mg/dL    BUN 29 (H) 8 - 28 mg/dL    Creatinine 1.26 (H) 0.60 - 1.10 mg/dL    GFR MDRD Af Amer 51 (L) >60 mL/min/1.73m2    GFR MDRD Non Af Amer 42 (L) >60 mL/min/1.73m2    Bilirubin, Total 0.7 0.0 - 1.0 mg/dL    Calcium 9.4 8.5 - 10.5 mg/dL    Protein, Total 7.4 6.0 - 8.0 g/dL    Albumin 4.3 3.5 - 5.0 g/dL    Alkaline Phosphatase 85 45 - 120 U/L    AST 22 0 - 40 U/L    ALT 24 0 - 45 U/L    Narrative    Fasting Glucose reference range is 70-99 mg/dL per  American Diabetes Association (ADA) guidelines.   Lipid Cascade RANDOM   Result Value Ref Range    Cholesterol 102 <=199 mg/dL    Triglycerides 81 <=149 mg/dL    HDL Cholesterol 53 >=50 mg/dL    LDL Calculated 33 <=129 mg/dL    Patient Fasting > 8hrs? No    Hepatitis C Antibody (Anti-HCV)   Result Value Ref Range    Hepatitis C Ab Negative Negative   Thyroid Cascade   Result Value Ref Range    TSH 1.62 0.30 - 5.00 uIU/mL       Please call with questions or contact us using [employer].    Sincerely,        Electronically signed by Binta Wheeler MD

## 2021-06-21 NOTE — PROGRESS NOTES
[facility] Patient Outreach:     Discharge Date::  10/25/18      Reason for TCU stay (discharge diagnosis)::  Aspiration pneumonia, dysphagia      Are you feeling better, the same or worse since your discharge?:  Patient is feeling better          As part of your discharge plan, did they discuss home care with you?: Yes        Have your seen them yet, or are they scheduled to visit?: Yes                Do you have any follow up visits scheduled with your PCP or Specialist?:  Yes, with PCP (Patient will be following with Rian Physician at the [facility].  Patient to see provider on 10/29/18.  )      (RN) Is it scheduled soon enough (3-5 days)?: Yes

## 2021-06-21 NOTE — LETTER
Letter by Myhre, David J, RN at      Author: Myhre, David J, RN Service: -- Author Type: --    Filed:  Encounter Date: 3/23/2021 Status: (Other)       Care Plan  About Me:    Patient Name:  Bing Rodrigues    YOB: 1950  Age:         71 y.o.   [facility] MRN:    [id number] Telephone Information:  Home Phone [phone]   Mobile Not on file.       Address:  [address] [place], [place] [id number] Email address:  [email or web address]      Emergency Contact(s)  Extended Emergency Contact Information      Name: Oscar Rodrigues  Address:       [address] [place], [place] [id number]  Home Phone Number: [phone]  Relation: Spouse          Primary language:  English     needed? No   [facility] Language Services:  [phone] op. 1  Other communication barriers: None  Preferred Method of Communication:     Current living arrangement: I live in a private home with family  Mobility Status/ Medical Equipment:      Health Maintenance  Health Maintenance Reviewed: Reviewed. Will discuss scheduling DEXA scan at next Office Visit with Dr. Wheeler. Oscar will assist patient with scheduling her COVID-19 vaccine.   My Access Plan  Medical Emergency 911   Primary Clinic Line Binta Wheeler MD - [phone]   24 Hour Appointment Line [phone] or  [phone]) (toll-free)   24 Hour Nurse Line [phone] (toll-free)   Preferred Urgent Care [facility], [phone]   [facility]  [phone]   Preferred Pharmacy [facility] - [place], [place] [facility] [address]     Behavioral Health Crisis Line The National Suicide Prevention Lifeline at 1-509.544.4306 or 911             My Care Team Members  Patient Care Team       Relationship Specialty Notifications Start End    Binta Wheeler MD PCP - General Family Medicine  3/12/20     Phone: [phone] Fax: [phone] [address] [facility] [address]  [place] [address]    Binta Wheeler MD Assigned PCP   3/23/20     Phone: [phone] Fax: [phone]         [address] [facility] [address]    Myhre, David J, RN Lead Care Coordinator Primary Care - [facility] Admissions 9/15/20     Fax: [phone]         Gagan Quinn MD Assigned Heart and Vascular Provider   2/28/21     Phone: [phone] Fax: [phone]         [address] [facility] [address] [facility] [address]    Carol Diaz, CHW Community Health Worker Primary Care - [facility] Admissions 3/23/21     Phone: [phone] Fax: [phone]                My Care Plans  Self Management and Treatment Plan  Goals and (Comments)  Goals        General    Functional (pt-stated)     Notes - Note created  3/23/2021 10:37 AM by Myhre, David J, RN    Goal Statement: Patient and her  Oscar stated: I would like to have home care, specifically an RN to assist me with my medications, a speech therapist, physical therapist, and occupational therapy in the next 30 days.   Date Goal set: 3/23/21  Barriers: Needing assistance with care at home.   Strengths: Support , family and friends.   Date to Achieve By: 4/23/21  Patient expressed understanding of goal: Yes  Action steps to achieve this goal:  1. I understand the the [facility] RN request a referral from my PCP for home care, RN, PT, Speech and OT.  2. I understand home care should be contacted us directly.   3. I work with the home care staff to ensure my needs and safety are being addressed so I can live successfully at home.   4. I will report progress towards this goal at scheduled outreach telephone calls from my [facility] team.       Psychosocial (pt-stated)     Notes - Note created  3/23/2021 10:43 AM by Myhre, David J, RN    Goal Statement:  I would like to complete an Advance Care Directive in the next 6 months.   Date Goal set: 3/23/21  Barriers: None  Strengths: Strong support from  and family.   Date to Achieve By: 9/23/21  Patient  expressed understanding of goal: yes  Action steps to achieve this goal:  1. I understand the [facility] RN will mail a copy of the Advance Care Directive directly to me.  2. I will complete the Advance Care Directive and forward a copy to to my Clinic.   3. I will report progress towards this goal at scheduled outreach telephone calls from the [facility] team.                  Advance Care Plans/Directives Type:        My Medical and Care Information  Problem List   Patient Active Problem List   Diagnosis   ? Urinary Incontinence   ? Benign Essential Hypertension   ? Chronic Kidney Disease, Stage 3   ? Osteopenia   ? Generalized Ischemic Cerebrovascular Disease With Cerebral Degeneration   ? Diaphragmatic hernia without obstruction or gangrene   ? Gastro-esophageal reflux disease with esophagitis   ? Oropharyngeal dysphagia   ? Lung nodule   ? Renal cyst   ? Malnutrition, unspecified type (H)   ? Hemiplegia and hemiparesis following cerebral infarction affecting left non-dominant side (H)   ? Cognitive impairment   ? Gastrostomy present (H)   ? Reactive depression      Current Medications and Allergies:  See printed Medication Report.    Care Coordination Start Date: 7/28/2020   Frequency of Care Coordination:     Form Last Updated: 03/23/2021

## 2021-06-22 NOTE — PROGRESS NOTES
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     Joint Pain Fingers     Closed Fracture Of The Distal Phalanx Of The Left Third Finger     CAP (community acquired pneumonia)     Fall, initial encounter     Special screening for malignant neoplasms, colon     Diaphragmatic hernia     Diaphragmatic hernia without obstruction or gangrene     Dysphagia     Esophageal obstruction     Esophagitis     Gastro-esophageal reflux disease with esophagitis     Gastroesophageal reflux disease with esophagitis     Gastroesophageal reflux disease without esophagitis     Gastro-esophageal reflux disease without esophagitis     Obstruction of esophagus     Oropharyngeal dysphagia     Aspiration pneumonitis (H)     Aspiration pneumonia (H)     Hypomagnesemia     Lung nodule     Renal cyst       Onset Date: 11/27/2018 (order date)  Reason for Evaluation: Re-assess oropharyngeal swallow physiology and function  Pertinent History: 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 Recent hospital admission (8/15/18 to 8/25/18) due to aspiration pneumonia. Previous Video Swallow Studies completed on 4/10/17, 7/7/17, 8/16/18, and 10/12/18.  Most recent study showed silent aspiration with honey-thick liquid.  Current Diet: NPO; TF via G-tube  Baseline Diet: NPO since hospitalization in August    Patient is a 68-year-old female referred for a repeat Video Swallow Study due to dysphagia.  Patient is known to this writer from recent admission to this facility.  " She has participated in previous Video Swallow Studies on 8/16/18 and 10/12/18.  Both showed silent aspiration with honey-thick liquid.  Patient has been residing at [facility] since October.  She is receiving in-home Speech Therapy for her dysarthria, however, she does not recall if she is also receiving dysphagia therapy.  The purpose of this study is to assess for any improvement in swallow function and determine safety of oral intake.  Patient is very eager to resume an oral diet and is hopeful that she will \"pass\" today's evaluation.     Patient presents as alert and cooperative during this evaluation.  She was accompanied to study byJenifer, the director of [facility].    An  was not applicable    Patient was given honey-thick liquid and puree consistency.  Thin and nectar-thick liquid were not given due to high aspiration risk.    Oral Phase:    Dentition/Oral hygiene: Patient wears an upper dental partial. She is missing some upper front teeth and most/all of her lower molars. Oral hygiene was adequate.    Bolus prep and oral control were moderately impaired. Patient demonstrated a prolonged oral phase and used persistent tongue pumping to attempt to propel bolus posteriorly.    Anterior-Posterior transit was moderately delayed/effortful.    Mild premature spillage beyond the base of the tongue into the valleculae occurred with honey-thick liquid and puree.    Tongue base retraction was mildly impaired.    Mild anterior oral stasis was observed intermittently with honey-thick liquid.    Pharyngeal Phase:    Direct aspiration occurred with honey-thick liquid during the swallow.  Patient also experienced aspiration after the swallow with puree consistency.  This was related to stasis in the pyriform sinuses.  She had no cough response (i.e., aspiration was silent).    Transient laryngeal penetration occurred with initial bolus of puree.    Due to decreased cognitive status, swallow " strategies were not trialed under fluoroscopy.    Swallow response was delayed with honey-thick liquid and puree.  This resulted in pourover past the epiglottis and intermittently to the pyriform sinuses with puree.  With honey-thick, there was pourover to the pyriform sinuses and directly into the laryngeal vestibule, resulting in chriss aspiration.    Epiglottic movement was complete consistently across texture trials.    No pharyngeal stasis was observed immediately after the swallow with either consistency.  However, patient had a brief coughing episode following a bolus of puree (not related to aspiration).  When fluoroscopy was turned back on, patient was suddenly noted to have a mild amount of stasis in the pyriform sinuses bilaterally.  Presumably, she coughed this up from the upper esophagus.  It partially cleared when she completed a dry swallow, however, as previously mentioned, she also aspirated a portion of this material.    Pharyngeal constriction was not impaired.    Hyolaryngeal elevation was slow and reduced. Hyolaryngeal excursion was slow and reduced.    Cricopharyngeal function was not impaired. Cervical esophageal function was not impaired.    Assessment:    Patient demonstrated moderate oral dysphagia and severe pharyngeal dysphagia.    Patient is at high aspiration risk with all intake.  She is also at risk for aspiration-related illness due to lack of cough response and ability to clear airway of aspirated material.     Rehab potential is guarded based on prior level of function and evaluation results.    Recommendations:    Plan: Continue NPO due to high aspiration risk with any/all oral intake    Strategies: Consider trials of ice chips with Speech Pathologist to facilitate oral pharyngeal function and comfort.    Continue in-home Speech Therapy.  Patient may benefit from hyolaryngeal/pharyngeal exercises.    Referrals: Recommend an additional 6-8 weeks of Speech Therapy prior to repeating  Video Swallow Study.  Also, consider Neurology consult.  Video Swallow Studies completed in 2018 show a significant decline in swallow function compared to studies completed in 2017.  The reason for this is unclear.    Reviewed history of swallow problem with patient and caregiver (Jenifer), and verbally explained roles of SLP and radiologist.  Verbally explained process of VFSS prior to administration of barium.  Verbally explained results and recommendations to patient and Jenifer. SLP answered questions and stated that a copy of this report will be faxed to patient's referring provider, Dr. Natalie Herrera of [facility].  SLP will also fax a copy directly to Jenifer at [facility].  Jenifer verbalized understanding.  Patient was appropriately disappointed with results of evaluation and was quite tearful.  She was not fully attending to SLP and demonstrated only partial comprehension of the information provided.    30 dysphagia minutes    Brisa Mariano MA, CCC-SLP

## 2021-06-25 ENCOUNTER — COMMUNICATION - HEALTHEAST (OUTPATIENT)
Dept: NURSING | Facility: CLINIC | Age: 71
End: 2021-06-25

## 2021-06-25 NOTE — PROGRESS NOTES
Assessment & Plan     Hemiplegia and hemiparesis following cerebral infarction affecting left non-dominant side (H)  Cognitive impairment  Oropharyngeal dysphagia  Gastrostomy present (H)  Patient has minimal cognitive impairment, but cannot manage her medications.  I also have some concerns of patient's  has some cognitive impairment and thus the medications have been often mismanaged.  Recently he has been doing an excellent job.  Patient really struggled with her stroke diagnosis especially as it relates to her oropharyngeal dysphagia.  Patient was adamant that she would like to be able to eat again, but we had a long discussion at last appointment that this would not be safe.  I think that patient moving into an assisted living facility is the most appropriate and safe option.  -Patient will need feeding tube management  -Medication management  -And fall safety awareness and prevention    Reactive depression  Likely poorly controlled.  This is circumstantial and is unlikely to improve dramatically.  I did recommend citalopram 20 mg, it is unclear if patient has been taking 10 or 20 mg.  Very low threshold to increase if patient desires.    Benign Essential Hypertension  Well-controlled today, no changes in medication.  - Comprehensive Metabolic Panel    Stage 3 chronic kidney disease, unspecified whether stage 3a or 3b CKD  Lab work obtained today  - Comprehensive Metabolic Panel    Malnutrition, unspecified type (H)  Patient is have a history of anemia for which she was taking iron.  This was discontinued by patient's .  Patient's hemoglobin is normal.  I think it is reasonable to discontinue the iron and have removed it from her med list.  - HM2(CBC w/o Differential)    Esophagitis  GERD  I also change the famotidine to twice daily as needed dosing as patient was not taking this anyway and no longer has any symptoms.  - famotidine (PEPCID) 40 mg/5 mL (8 mg/mL) oral suspension  Dispense: 450  mL; Refill: 3    Incontinence:  - monitor for UTI, otherwise no action plan at this time      BMI:   Estimated body mass index is 26.17 kg/m  as calculated from the following:    Height as of 12/29/20: 5' (1.524 m).    Weight as of this encounter: 134 lb (60.8 kg).   No follow-ups on file.    Binta Wheeler MD  [facility] Rodrigues is 71 y.o. and presents today for the following health issues   HPI     Patient presents to clinic after moving into assisted living.  This will serve as her history and physical.  Patient has had no major changes since she was last seen.  She has not been able to go shopping which is something that she was hoping to be able to do.  Her mood is poor but not worse than normal.  It is unclear if patient is taking 10 or 20 mg of her citalopram the patient does not currently feel as though she has noticed any worsening symptoms.    Patient is no longer discussing whether or not she would be able to eat.  It appears that many of her medications have been managed well by her .  She does not experience any symptoms of heartburn.        Objective    /80   Pulse 69   Temp 98.2  F (36.8  C)   Wt 134 lb (60.8 kg)   SpO2 94%   BMI 26.17 kg/m    Body mass index is 26.17 kg/m .  Physical Exam   Gen: Alert, NAD, appears stated age, normal hygiene   Eyes: conjunctivae without injection, sclera clear, EOMI  CV: RRR, no murmur appreciated, pedal edema absent bilaterally  Resp: CTAB, no wheezes, rales or ronchi  ABD: normoactive, non-tender to palpation, nondistended; feeding tube in place  MSK: grossly full range of motion in all joints, no obvious deformity  Neuro: Obvious slurring from previous stroke, patient does walk with a walker, otherwise no changes from her baseline  Psych: no apparent hallucinations or delusions, no pressured speech; alert, oriented x3  SKIN: dry and without lesions; no signs of infection surrounding patient's  G-tube  Heme/lymph: no pallor, no active bleeding/bruising, no adenopathy appreciated    Past Medical History:   Diagnosis Date     Hypertension      Pneumonia      Stroke (H)      Past Surgical History:   Procedure Laterality Date     G TUBE REPLACEMENT  8/14/2019     G TUBE REPLACEMENT  11/13/2019     G TUBE REPLACEMENT  1/23/2020     G TUBE REPLACEMENT  4/29/2020     G TUBE REPLACEMENT  7/30/2020     G TUBE REPLACEMENT  10/26/2020     G TUBE REPLACEMENT  1/26/2021     G TUBE REPLACEMENT  5/4/2021     IR GJ TUBE REPLACEMENT  1/17/2019     IR GJ TUBE REPLACEMENT  4/18/2019     VT DILATION/CURETTAGE,DIAGNOSTIC      Description: Dilation And Curettage;  Recorded: 02/07/2013;     Relationships   Social connections     Talks on phone: More than three times a week     Gets together: Once a week     Attends [employer] service: More than 4 times per year     Active member of club or organization: No     Attends meetings of clubs or organizations: Never     Relationship status:      Family History   Problem Relation Age of Onset     Stroke Maternal Grandmother      Stroke Maternal Grandfather      Early death Paternal Grandmother      COPD Paternal Grandfather      Cancer Father         from smoking     Patient Active Problem List   Diagnosis     Urinary Incontinence     Benign Essential Hypertension     Chronic Kidney Disease, Stage 3     Osteopenia     Generalized Ischemic Cerebrovascular Disease With Cerebral Degeneration     Diaphragmatic hernia without obstruction or gangrene     Gastro-esophageal reflux disease with esophagitis     Oropharyngeal dysphagia     Lung nodule     Renal cyst     Malnutrition, unspecified type (H)     Hemiplegia and hemiparesis following cerebral infarction affecting left non-dominant side (H)     Cognitive impairment     Gastrostomy present (H)     Reactive depression       Current Outpatient Medications:      acetaminophen (TYLENOL) 500 mg/15.62 mL solution, 19.99 mL (640 mg total)  "by G-tube route every 4 (four) hours as needed for fever or pain., Disp: , Rfl: 0     amLODIPine (NORVASC) 5 MG tablet, 1 tablet (5 mg total) by G-tube route daily., Disp: 90 tablet, Rfl: 2     aspirin 81 mg chewable tablet, 1 tablet (81 mg total) by G-tube route daily., Disp: , Rfl: 0     calcium, as carbonate, (TUMS) 200 mg calcium (500 mg) chewable tablet, 2 tablets (400 mg total) by G-tube route 4 (four) times a day as needed for heartburn., Disp: , Rfl: 0     cholecalciferol, vitamin D3, 1,000 unit tablet, 1 tablet (1,000 Units total) by G-tube route daily., Disp: , Rfl: 0     citalopram (CELEXA) 20 MG tablet, Take 1 tablet (20 mg total) by mouth daily., Disp: 90 tablet, Rfl: 3     feeding container and pump set Misc, Use 30 Bags As Directed every 30 (thirty) days. [employer] (MM)ey Enteral Feeding Pump Set 1\" 1000 ml 30 bags per month, Disp: 1 each, Rfl: 0     guar gum (BENEFIBER;NUTRISOURCE) Pack powder packet, 2 packets by Enteral Tube route 2 (two) times a day., Disp: , Rfl: 0     loperamide (IMODIUM) 1 mg/5 mL solution, 10 mL (2 mg total) by G-tube route daily as needed for diarrhea., Disp: , Rfl: 0     melatonin 3 mg Tab tablet, 1 tablet (3 mg total) by Enteral Tube route at bedtime as needed., Disp: , Rfl: 0     nut.tx.impaired digest fxn (PEPTAMEN 1.5) 0.068 gram- 1.5 kcal/mL Liqd, Take 1 Bottle by mouth 4 (four) times a day. Peptamen 1.5 @@ 40 ml/hr for 22 hours then off for 2 hours.  Total of 520 CC over 24 hours, Disp: 120 Bottle, Rfl: 30     NUTRISOURCE FIBER Powd, DISSOLVE 4 TEASPOONSFUL IN WATER AND GIVE PER G TUBE TWICE DAILY, Disp: , Rfl:      nystatin (MYCOSTATIN) powder, Apply to affected area 3 times daily, Disp: 15 g, Rfl: 0     omeprazole (PRILOSEC) 2 mg/mL SusR suspension, 10 mL (20 mg total) by Enteral Tube route daily before breakfast., Disp: 300 mL, Rfl: 3     omeprazole (PRILOSEC) 20 MG capsule, PLEASE SEE ATTACHED FOR DETAILED DIRECTIONS, Disp: 90 capsule, Rfl: 3     syringe, ENFit, " non-sterile (MONOJECT ENFIT SYRINGE) 60 mL Syrg, Use 1 Syringe As Directed as needed. 30 CC water bolus after before and after feedings, Disp: 30 Syringe, Rfl: 11     syringe, ENFit, sterile (MONOJECT ENFIT SYRINGE) 12 mL Syrg, Use 1 Syringe As Directed as needed. Monoject enteral syringe female, Disp: 30 Syringe, Rfl: 11     famotidine (PEPCID) 40 mg/5 mL (8 mg/mL) oral suspension, 2.5 mL (20 mg total) by G-tube route 2 (two) times a day as needed for heartburn., Disp: 450 mL, Rfl: 3

## 2021-06-26 NOTE — PROGRESS NOTES
Clinic Care Coordination Contact    Follow Up Progress Note      Assessment: [facility] RN spoke with patient's  Oscar to follow up on her goals and to discuss and needs or concerns. Oscar said he and patient has  to [facility]. He stated patient is getting RN services through the Assisted Living, including medication management, monitoring of vitals and additional care needs. Oscar said there is a LTC attached to the Assisted Living if Bing was in LTC.  Agreed to move patient to maintenance as patient need are being met by the Assisted Living staff. [facility] CHW will reach out to patient in two months to discuss and needs or concerns. Oscar is aware he can contact [facility] at anytime during the next two months if needs or concerns should arise.     Goals addressed this encounter:    Goals    None          Intervention/Education provided during outreach: Discussed the importance of patient taking her medications as directed. Encouraged her to attend all follow up appointments.          Plan: [facility] RN will continue to monitor and will be available to assist nursing needs arise.     Care Coordinator will perform Chart Review in 45 days.

## 2021-06-29 NOTE — PROGRESS NOTES
Progress Notes by Kathy Barrera RN at 7/28/2020  4:00 PM     Author: Kathy Barrera RN Service: -- Author Type: Registered Nurse    Filed: 7/28/2020  3:12 PM Encounter Date: 7/28/2020 Status: Signed    : Kathy Barrera RN (Registered Nurse)       Clinic Care Coordination Contact    Clinic Care Coordination Contact  OUTREACH    Referral Information:  Referral Source: PCP    Primary Diagnosis: Neurological Disorders    Chief Complaint   Patient presents with   ? Clinic Care Coordination - Initial        Clinic Utilization  Difficulty keeping appointments:: No  Compliance Concerns: No  No-Show Concerns: No  No PCP office visit in Past Year: No  Utilization    Last refreshed: 7/28/2020  1:08 PM:  Hospital Admissions 0           Last refreshed: 7/28/2020  1:08 PM:  ED Visits 0           Last refreshed: 7/28/2020  1:08 PM:  No Show Count (past year) 0              Current as of: 7/28/2020  1:08 PM              Clinical Concerns:  Current Medical Concerns:   Htn, dysphagia, vitamin D deficiency, osteopenia, CVA, ataxic gait, gerd, hemiplegia following cerebral infarct, cognitive impairment, gastrostomy tube, urge incontinence of urine  Current Behavioral Concerns:reactive depression    Education Provided to patient: yes to spouse   Pain  Pain (GOAL):: No  Health Maintenance Reviewed: Up to date  Clinical Pathway: None     Medication Management:  Spouse is currently managing all medications.  Has home care to assist with teaching and education     Functional Status:  Dependent ADLs:: Ambulation-walker  Dependent IADLs:: Transportation, Medication Management(Tube Feeding Management)  Bed or wheelchair confined:: No  Mobility Status: Independent w/Device  Fallen 2 or more times in the past year?: (1 fall noted)  Any fall with injury in the past year?: No    Living Situation:  Current living arrangement:: I live in a private home with spouse  Type of residence:: Private home - stairs    Lifestyle & Psychosocial  Needs:        Diet:: (npo)  Inadequate nutrition (GOAL):: No  Tube Feeding: Yes  Tube Feeding: G-tube  Inadequate activity/exercise (GOAL):: No  Significant changes in sleep pattern (GOAL): No  Transportation means:: Friend     Congregation or spiritual beliefs that impact treatment:: No  Mental health DX:: No  Mental health management concern (GOAL):: No  Informal Support system:: Spouse, Friends, Neighbors   Socioeconomic History   ? Marital status:      Spouse name: Not on file   ? Number of children: Not on file   ? Years of education: Not on file   ? Highest education level: Not on file     Tobacco Use   ? Smoking status: Never Smoker   ? Smokeless tobacco: Never Used   Substance and Sexual Activity   ? Alcohol use: No   ? Drug use: No   ? Sexual activity: Not Currently     Partners: Male             Resources and Interventions:  Current Resources:   List of home care services:: Skilled Nursing, Home Health Aid, Physicial Therapy, Speech Therapy, Occupational Therapy  Community Resources: None  Supplies used at home:: [employer]  Equipment Currently Used at Home: hospital bed    Advance Care Plan/Directive  Advanced Care Plans/Directives on file:: No    Referrals Placed: None     70 yr F PMH: Htn, dysphagia, vitamin D deficiency, osteopenia, CVA, ataxic gait, gerd, hemiplegia following cerebral infarct, cognitive impairment, gastrostomy tube, urge incontinence of urine  reactive depression.  Patient had been living at [facility].  Transitioned to home with spouse as primary caregiver.  Home care ordered for spouse to have education for teaching of medication management, tube feeding via G-tube.  Bing ambulates via walker.  She has a 'rent to own' hospital bed and a Kangaroo Pump for feedings.  Since arriving home there was 1 medication error and 1 fall. Patient has followed up with PCP after both events.  Writer asked Ramon directly if Bing should 'return to the  "nursing home' or if she was \"safe in the home\", writer asked if Ramon was able to care for her.  Ramon only stated \"She gets her medications everyday\".   Upon completing med rec; spouse stating yesterday 7/27 he transferred all medications to [facility] mail to home pharmacy.  He stated these medications will take \"7-10 days\" before arriving at his home.  Ramon stating Bing has \"2 days of amlodipine in the home and 4 days of Citalopram in the home\".   See other telephone note from 7/28/20.  Patient does not have any omeprazole susp in the home.  Writer explained to patient that he would may have to pay cash out of pocket for these medications. (determined by [employer]).  Ramon is okay with this and stated an understanding.  He is asking for a 10 day supply.  Writer got patient to agree to a 7 day supply.  [facility] RN can then reassess next week as to status of [employer] pharmacy order and see if additional out of pocket medications are needed.  PCP sent scripts to pharmacy for Bing.  Ramon additionally stating he is unable to order tube feedings and bags from [facility].  He has 'about a months' worth of cans and '9 bags' left.  Writer will coordinate with PCP.    Writer offered [facility] phone number.  Ramon stating 'they do not qualify' for [facility] due to \"having money at [employer]\".  Writer gave Ramon the phone number to [employer] and instructed him to call today.  Writer also scheduled a telephone appointment with [facility] for 8/6 @ 2:00.  Neither Bing or Ramon drive.  Currently Ramon is getting a neighbor to drive him to the grocery store.  Writer mentioned [employer] delivers for free.  Will address at future telephone visits.  Goals created based on today's telephone conversation.  Additional Goals will need to be added.  Possible Goals related to transportation, etc.  Did not discuss with patient today due to other topics discussed.       Goals:   Goals        General    Medication 1 (pt-stated)     Notes - " Note edited  7/28/2020  1:04 PM by Kathy Barrera, RN    Goal Statement: I would like to obtain Tube Feeding bags and Tube Feeding cans from a new DME company covered by my insurance in the next 30 days  Date Goal set: 7/28/20  Barriers: lack of resources, coordination of care, complex medically  Strengths: motivated to learn  Date to Achieve By: 30 days  Patient expressed understanding of goal: spouse stated an understanding  Action steps to achieve this goal:  1. The [facility] RN sent a note to Dr. Wheeler to notify her of my need for the above items.  2. Prescriptions for the above items will be written and faxed to [facility].  3. [facility] will contact me to determine a delivery date.       Medication 1 (pt-stated)     Notes - Note edited  7/28/2020  1:18 PM by Kathy Barrera, RN    Goal Statement: I would like to transfer my medications from [employer] to [facility] mail to home pharmacy in the next 2-4 weeks.  Date Goal set: 7/28/20  Barriers: coordination of care, medically complex  Strengths: motivated  Date to Achieve By: ongoing  Patient expressed understanding of goal: discussed with spouse  Action steps to achieve this goal:  1. Spouse spoke with [employer] mail to home pharmacy yesterday to start the process.  2. Bing will not have enough medication during the 7-10 day process.  3. [facility] RN sent a note to PCP for 7 days of medications for amlodipine and citalopram.   4.  [facility] RN will continue to call patient/spouse to check on status of medication inventory.  5. [facility] RN will call home care nurse to check on status of spouse knowledge of medications.      Other (pt-stated)     Notes - Note created  7/28/2020  1:09 PM by Kathy Barrera, RN    Goal Statement: I would like information to identify community resources or help in the home in the next 30-60 days.  Date Goal set: 7/28/20  Barriers: medically complex, lack of resources, coordination of care  Strengths: motivated  Date  to Achieve By: ongoing  Patient expressed understanding of goal: discussed with spouse  Action steps to achieve this goal:  1. I will talk to the [facility] 8/6/20 for additional community resources.  2. The [facility] RN gave me the phone number to [employer].  3. I was offered the number for a [place] Cho[place] Assessment and I declined due to my assets.  I will discuss further options with the .             Patient/Caregiver understanding: Spouse stated an understanding       Future Appointments              [facility], [facility]    In 2 days Binta Wheeler MD [facility]/OB, [facility]    In 2 days MIRTA RADIOLOGIST; MIRTA [place] [facility], JN    In 6 days E [facility] RN [facility] Clinical Support, [facility] CLINIC    In 1 week [facility] Clinical Support, [facility]          Plan: DELEGATION: NONE

## 2021-06-30 NOTE — PROGRESS NOTES
Progress Notes by Myhre, David J, RN at 3/23/2021  9:30 AM     Author: Myhre, David J, RN Service: -- Author Type: Registered Nurse    Filed: 3/23/2021  1:35 PM Encounter Date: 3/23/2021 Status: Signed    : Myhre, David J, RN (Registered Nurse)       Clinic Care Coordination Contact    Clinic Care Coordination Contact  OUTREACH    Referral Information:  Referral Source: PCP         Chief Complaint   Patient presents with   ? Clinic Care Coordination - Initial      Patient's  Oscar was also on today's call.   [place] Utilization:   Clinic Utilization  Difficulty keeping appointments:: No  Compliance Concerns: No  No-Show Concerns: No  No PCP office visit in Past Year: No  Utilization    Last refreshed: 3/23/2021 10:38 AM: Hospital Admissions 0           Last refreshed: 3/23/2021 10:38 AM: ED Visits 0           Last refreshed: 3/23/2021 10:38 AM: No Show Count (past year) 2              Current as of: 3/23/2021 10:38 AM              Clinical Concerns:  Current Medical Concerns:  Patient is a 71 year old woman with a history of HTN, CKD stage 3, osteopenia, cerebrovascular disease with cerebral degeneration, GERD, hemiplegia and hemiparesis follow cerebral infarction affecting left non-dominant side, cognitive impairment, gastrostomy present, reactive depression.  Patient stated currently getting nutrition and medications through her G-tube. Patient's  is her primary caregiver and is currently administering her medications and manages her tube feedings. Patient  said he does feel comfortable managing her tube feeding, but said he would like assistance with her medication management. He said he feel comfortable taking the medications out of the medications box and administering them to patient, but said he has issues with setting up the medications in the medication boxes. [facility] RN will request a referral to home care from her PCP. Patient and  would additionally like speech therapy,  physical therapy and occupational therapy support. Writer will include this information in referral request. It was noted the [facility] sent out a new medication list to patient and her  to reference. Patient's  was aware of this and said he expects to receive the list in the mail today.   Oscar said he would assist patient with scheduling her COVID-19 vaccine today.     Current Behavioral Concerns: Patient reported a history of depression. She is currently taking Citalopram on a daily basis. She stated she believes she is getting some benefit from this medication related to her depressive symptoms. She denied wanting to work with a therapist for additional mental health support at this time. She was encouraged to contact [facility] or her PCP if she changes her mind for therapy resources. Patient denied any suicidal ideation during today's assessment.     Education Provided to patient: Discussed the importance of patient receiving her medications daily as directed and well has her tube feeding. Encouraged attendance at all scheduled follow up medical appointments. Discussed safety methods to implement in the home to prevent falls/injury. Discussed COVID-19 precautions.   Pain  Pain (GOAL):: No  Health Maintenance Reviewed: Reviewed. As noted above, Oscar will assist her with scheduling her COVID-19 vaccine. Will discuss getting DEXA scan with PCP at next Office      Medication Management:  Patient's currently manages patient's medications, but said he would like some assistance from an home care RN for medication set up. Referral was requested from her PCP.     Functional Status:      Patient is independent with her own personal cares. She does require assistance with medications and tube feeding. Patient currently using [employer] for transportation. Friends assist her with picking up her medications from the pharmacy. Oscar does the cooking, cleaning, shopping, laundry and meal preparation. They denied  needing resources to assist with IADLs as this time.     Living Situation:  Current living arrangement:: I live in a private home with family  Type of residence:: [facility] home  Patient denied wanting to move to an assisted living or LTC facility at this time.     Lifestyle & Psychosocial Needs:        Inadequate nutrition (GOAL):: No  Tube Feeding: Yes  Tube Feeding: G-tube  Inadequate activity/exercise (GOAL):: No  Significant changes in sleep pattern (GOAL): No  Transportation means:: [employer]    Friends assist with medications pick ups from the pharmacy.   Hindu or spiritual beliefs that impact treatment:: No  Mental health DX:: Yes  Mental health DX how managed:: Medication  Mental health management concern (GOAL):: No  Chemical Dependency Status: Not Applicable  Informal Support system:: Spouse, Family, Neighbors, Friends, [place] based   Socioeconomic History   ? Marital status:      Spouse name: Not on file   ? Number of children: Not on file   ? Years of education: Not on file   ? Highest education level: Not on file     Tobacco Use   ? Smoking status: Never Smoker   ? Smokeless tobacco: Never Used   Substance and Sexual Activity   ? Alcohol use: No   ? Drug use: No   ? Sexual activity: Not Currently     Partners: Male          Financial Concerns: Patient and her  denied any financial concerns at this time.       Resources and Interventions:  Current Resources:      Community Resources: None  Supplies Currently Used at Home: [employer], Nutritional Supplements  Equipment Currently Used at Home: walker, rolling, shower chair, grab bar, tub/shower  Type of Employment: Retired      Denied needing any additional DME at this time.     Advance Care Directive: [facility] RN will mail one out to patient. Patient centered goal around completing an Advance Care Directive was established during assessment today.       Referrals Placed: None     Goals:   Goals        General    Functional  (pt-stated)     Notes - Note created  3/23/2021 10:37 AM by Myhre, David J, RN    Goal Statement: Patient and her  Oscar stated: I would like to have home care, specifically an RN to assist me with my medications, a speech therapist, physical therapist, and occupational therapy in the next 30 days.   Date Goal set: 3/23/21  Barriers: Needing assistance with care at home.   Strengths: Support , family and friends.   Date to Achieve By: 4/23/21  Patient expressed understanding of goal: Yes  Action steps to achieve this goal:  1. I understand the the [facility] RN request a referral from my PCP for home care, RN, PT, Speech and OT.  2. I understand home care should be contacted us directly.   3. I work with the home care staff to ensure my needs and safety are being addressed so I can live successfully at home.   4. I will report progress towards this goal at scheduled outreach telephone calls from my [facility] team.       Psychosocial (pt-stated)     Notes - Note created  3/23/2021 10:43 AM by Myhre, David J, RN    Goal Statement:  I would like to complete an Advance Care Directive in the next 6 months.   Date Goal set: 3/23/21  Barriers: None  Strengths: Strong support from  and family.   Date to Achieve By: 9/23/21  Patient expressed understanding of goal: yes  Action steps to achieve this goal:  1. I understand the [facility] RN will mail a copy of the Advance Care Directive directly to me.  2. I will complete the Advance Care Directive and forward a copy to to my Clinic.   3. I will report progress towards this goal at scheduled outreach telephone calls from the [facility] team.               Patient/Caregiver understanding: Patient and Oscar verbalized understanding of goals and other information discussed at today's assessment.            Plan: [facility] RN will continue to monitor, support patient with current goals and will be available to assist as nursing needs arise. [facility] CHW will support patient with current goals  through scheduled outreach telephone calls and will provide resources as needed.

## 2021-06-30 NOTE — PROGRESS NOTES
Progress Notes by Gagan Quinn MD at 12/29/2020  1:10 PM     Author: Gagan Quinn MD Service: -- Author Type: Physician    Filed: 12/29/2020  8:47 PM Encounter Date: 12/29/2020 Status: Signed    : Gagan Quinn MD (Physician)           Thank you, Binta Osborne MD, for asking the [facility] team to see Ms. Bing Rodrigues to evaluate bradycardia.      Assessment/Recommendations   Assessment:    1. Sinus bradycardia. Unclear etiology, but asymptomatic and she demonstrated chronotropic competence on her recent cardiac monitor. Denies any symptoms of sleep apnea, which also can cause bradycardia especially during sleep.  2. 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 Suspected due to malignant hypertension per chart review.  3. Benign essential hypertension. Slightly elevated today.  4. Chronic kidney disease stage III.    Plan:  1. No further workup or intervention needed at this time for bradycardia.  2. Would avoid rate-slowing agents such as beta blockers and non-DHP calcium channel blockers (amlodipine is fine to use).  3. Last LDL 33 and it does not appear she is on statin therapy. Reasonable to defer on statin therapy at this time.  4. Follow-up as needed.         History of Present Illness   Ms. Bing Rodrigues is a 70 y.o. female with a significant past history of of dysphagia s/p feeding tube placement, prior stroke in 2013 with residual left-sided weakness, HTN, and CKD stage III presenting for evaluation of bradycardia. This was noted incidentally during a recent clinic visit to her PCP, with heart rate being in the 40s. She wore a 24-hour Holter monitor, which showed her HR as low as 43 although it appeared to be sinus bradycardia. No prolonged pauses or high-grade AV block was seen. HR was noted to increase to over 100 at times. There was rare ventricular and supraventricular ectopy.    She is not very active but  gets around with her walker. She denies any lightheadedness or syncope. She also denies any chest pain/pressure/tightness, shortness of breath at rest or with exertion, lower extremity swelling, palpitations, paroxysmal nocturnal dyspnea (PND), or orthopnea. She does not snore and feels rested after sleeping.     Cardiac Problems and Cardiac Diagnostics     Most Recent Cardiac testing:  ECG today (personaly reviewed and interpreted): SR HR 73 bpm, no conduction abnormalities, lateral T wave abnormality     ECHO (report reviewed):   Echo results:   Results for orders placed during the hospital encounter of 07/05/17   Echo Complete [ECH10] 07/06/2017    Narrative   Left ventricle ejection fraction is normal. The calculated left   ventricular ejection fraction is 71%.    No hemodynamically significant valvular heart abnormalities.        24-hour Holter 12/2020 (data personally reviewed and interpreted):  Predominant rhythm is sinus rhythm with sinus rates ranging from 43  beats per minute to 109 beats per minute.  There was a mild tendency towards sinus  bradycardia with average resting heart rate 50 to 55 beats per minute.  There were  no clinically significant pauses.  Rare atrial premature beats were noted.  There  were 13 couplets and several short runs of primary atrial tachycardia.  The longest  run of atrial tachycardia occurred at 3:01 a.m. lasting at least 30 seconds with an  average heart rate of 110 beats per minute.  Rare ventricular premature beats were  present, 557 over 24 hours.  There was a brief ventricular bigeminy noted.  The  predominant PVC morphology had a left bundle configuration and superior axis  consistent with origin at the inferior wall of the right ventricle.  No diary was  submitted.     Medications  Allergies   Current Outpatient Medications   Medication Sig Dispense Refill   ? acetaminophen (TYLENOL) 500 mg/15.62 mL solution 19.99 mL (640 mg total) by G-tube route every 4 (four) hours  "as needed for fever or pain.  0   ? amLODIPine (NORVASC) 5 MG tablet 1 tablet (5 mg total) by G-tube route daily. 90 tablet 2   ? aspirin 81 mg chewable tablet 1 tablet (81 mg total) by G-tube route daily.  0   ? calcium, as carbonate, (TUMS) 200 mg calcium (500 mg) chewable tablet 2 tablets (400 mg total) by G-tube route 4 (four) times a day as needed for heartburn.  0   ? cholecalciferol, vitamin D3, 1,000 unit tablet 1 tablet (1,000 Units total) by G-tube route daily.  0   ? citalopram (CELEXA) 20 MG tablet Take 1 tablet (20 mg total) by mouth daily. 90 tablet 3   ? famotidine (PEPCID) 40 mg/5 mL (8 mg/mL) oral suspension TAKE 2.5 ML (20 MG TOTAL) BY MOUTH 2 (TWO) TIMES A DAY. 150 mL 11   ? feeding container and pump set Misc Use 30 Bags As Directed every 30 (thirty) days. Kangaroo Matt Enteral Feeding Pump Set 1\" 1000 ml 30 bags per month 1 each 0   ? ferrous sulfate 325 (65 FE) MG tablet 1 tablet     ? guar gum (BENEFIBER;NUTRISOURCE) Pack powder packet 2 packets by Enteral Tube route 2 (two) times a day.  0   ? guar gum (NUTRISOURCE FIBER) Powd DISSOLVE 4 TEASPOONSFUL IN WATER AND GIVE PER G-TUBE TWICE DAILY 410 g 11   ? loperamide (IMODIUM) 1 mg/5 mL solution 10 mL (2 mg total) by G-tube route daily as needed for diarrhea.  0   ? melatonin 3 mg Tab tablet 1 tablet (3 mg total) by Enteral Tube route at bedtime as needed.  0   ? nut.tx.impaired digest fxn (PEPTAMEN 1.5) 0.068 gram- 1.5 kcal/mL Liqd Take 1 Bottle by mouth 4 (four) times a day. Peptamen 1.5 @@ 40 ml/hr for 22 hours then off for 2 hours.  Total of 520 CC over 24 hours 120 Bottle 30   ? nystatin (MYCOSTATIN) powder Apply to affected area 3 times daily 15 g 0   ? omeprazole (PRILOSEC) 2 mg/mL SusR suspension 10 mL (20 mg total) by Enteral Tube route daily before breakfast. 300 mL 3   ? omeprazole (PRILOSEC) 20 MG capsule PLEASE SEE ATTACHED FOR DETAILED DIRECTIONS 90 capsule 3   ? syringe, ENFit, non-sterile (MONOJECT ENFIT SYRINGE) 60 mL Syrg Use 1 " Syringe As Directed as needed. 30 CC water bolus after before and after feedings 30 Syringe 11   ? syringe, ENFit, sterile (MONOJECT ENFIT SYRINGE) 12 mL Syrg Use 1 Syringe As Directed as needed. Monoject enteral syringe female 30 Syringe 11     No current facility-administered medications for this visit.       No Known Allergies     Physical Examination Review of Systems   Vitals:    12/29/20 1316   BP: 142/86   Pulse: 72   Resp: 16     Body mass index is 26.37 kg/m .  Wt Readings from Last 3 Encounters:   12/29/20 135 lb (61.2 kg)   12/10/20 127 lb (57.6 kg)   09/01/20 134 lb (60.8 kg)       General Appearance:   Pleasant  female, appears  stated age. no acute distress, normal body habitus   ENT/Mouth: Facemask      EYES:  no scleral icterus, normal conjunctivae   Neck: no carotid bruits. No anterior cervical lymphadenopaty   Respiratory:   lungs are clear to auscultation, no rales or wheezing, equal chest wall expansion    Cardiovascular:   Regular rhythm, normal rate. Normal first and second heart sounds with no murmurs, rubs, or gallops; the carotid, radial and posterior tibial pulses are intact, Jugular venous pressure normal, no edema bilaterally    Abdomen/GI:  no organomegaly, masses, bruits, or tenderness; bowel sounds are present   Extremities: no cyanosis or clubbing   Skin: no xanthelasma, warm.    Heme/lymph/ Immunology No apparent bleeding noted.   Neurologic: Alert and oriented. Walks with walker, no tremors     Psychiatric: Pleasant, calm, appropriate affect.    A complete 10 system review of systems was performed and is negative except as mentioned in the HPI or below:  General: WNL  Eyes: WNL  Ears/Nose/Throat: WNL  Lungs: Cough  Heart: WNL  Stomach: WNL  Bladder: WNL  Muscle/Joints: Muscle Weakness  Skin: WNL  Nervous System: WNL  Mental Health: WNL     Blood: WNL       Past History   Past Medical History:   Past Medical History:   Diagnosis Date   ? Hypertension    ? Pneumonia    ? Stroke (H)         Past Surgical History:   Past Surgical History:   Procedure Laterality Date   ? G TUBE REPLACEMENT  8/14/2019   ? G TUBE REPLACEMENT  11/13/2019   ? G TUBE REPLACEMENT  1/23/2020   ? G TUBE REPLACEMENT  4/29/2020   ? G TUBE REPLACEMENT  7/30/2020   ? G TUBE REPLACEMENT  10/26/2020   ? IR GJ TUBE REPLACEMENT  1/17/2019   ? IR GJ TUBE REPLACEMENT  4/18/2019   ? HI DILATION/CURETTAGE,DIAGNOSTIC      Description: Dilation And Curettage;  Recorded: 02/07/2013;       Family History:   Family History   Problem Relation Age of Onset   ? Stroke Maternal Grandmother    ? Stroke Maternal Grandfather    ? Early death Paternal Grandmother    ? COPD Paternal Grandfather    ? Cancer Father         from smoking       Social History:   Social History     Socioeconomic History   ? Marital status:      Spouse name: Not on file   ? Number of children: Not on file   ? Years of education: Not on file   ? Highest education level: Not on file   Occupational History   ? Not on file   Social Needs   ? Financial resource strain: Not on file   ? Food insecurity     Worry: Not on file     Inability: Not on file   ? Transportation needs     Medical: Not on file     Non-medical: Not on file   Tobacco Use   ? Smoking status: Never Smoker   ? Smokeless tobacco: Never Used   Substance and Sexual Activity   ? Alcohol use: No   ? Drug use: No   ? Sexual activity: Not Currently     Partners: Male   Lifestyle   ? Physical activity     Days per week: Not on file     Minutes per session: Not on file   ? Stress: Not on file   Relationships   ? Social connections     Talks on phone: Not on file     Gets together: Not on file     Attends Spiritism service: Not on file     Active member of club or organization: Not on file     Attends meetings of clubs or organizations: Not on file     Relationship status: Not on file   ? Intimate partner violence     Fear of current or ex partner: Not on file     Emotionally abused: Not on file     Physically  abused: Not on file     Forced sexual activity: Not on file   Other Topics Concern   ? Not on file   Social History Narrative   ? Not on file              Lab Results    Chemistry/lipid CBC Cardiac Enzymes/BNP/TSH/INR   Lab Results   Component Value Date    CHOL 102 12/10/2020    HDL 53 12/10/2020    LDLCALC 33 12/10/2020    TRIG 81 12/10/2020    CREATININE 1.26 (H) 12/10/2020    BUN 29 (H) 12/10/2020    K 4.6 12/10/2020     12/10/2020     12/10/2020    CO2 25 12/10/2020    Lab Results   Component Value Date    WBC 10.7 07/21/2020    HGB 12.8 07/21/2020    HCT 41.7 07/21/2020    MCV 81 07/21/2020     07/21/2020    Lab Results   Component Value Date    CKTOTAL 29 (L) 10/07/2014    TROPONINI <0.01 08/19/2018    BNP 25 07/05/2017    TSH 1.62 12/10/2020    INR 1.12 (H) 08/20/2018        Gagan Quinn MD [facility]  Non-Invasive Cardiologist  [facility]  Pager [phone]

## 2021-07-03 NOTE — ADDENDUM NOTE
Addendum Note by Binta Louis MD at 12/10/2020  1:50 PM     Author: Binta Louis MD Service: -- Author Type: Physician    Filed: 12/10/2020  2:51 PM Encounter Date: 12/10/2020 Status: Signed    : Binta Louis MD (Physician)    Addended by: BINTA LOUIS on: 12/10/2020 02:51 PM        Modules accepted: Orders

## 2021-07-03 NOTE — ADDENDUM NOTE
Addendum Note by Binta Louis MD at 8/19/2020  2:16 PM     Author: Binta Louis MD Service: -- Author Type: Physician    Filed: 8/20/2020  6:59 AM Encounter Date: 8/19/2020 Status: Signed    : Binta Louis MD (Physician)    Addended by: BINTA LOUIS on: 8/20/2020 06:59 AM        Modules accepted: Orders

## 2021-07-03 NOTE — ADDENDUM NOTE
Addendum Note by Azul Stallings at 12/10/2020  1:50 PM     Author: Azul Stallings Service: -- Author Type:     Filed: 12/11/2020  7:25 AM Encounter Date: 12/10/2020 Status: Signed    : Azul Stallings ()    Addended by: AZUL STALLINGS on: 12/11/2020 07:25 AM        Modules accepted: Orders

## 2021-07-06 VITALS
HEART RATE: 69 BPM | TEMPERATURE: 98.2 F | OXYGEN SATURATION: 94 % | SYSTOLIC BLOOD PRESSURE: 118 MMHG | BODY MASS INDEX: 26.17 KG/M2 | WEIGHT: 134 LBS | DIASTOLIC BLOOD PRESSURE: 80 MMHG

## 2021-07-07 NOTE — PROGRESS NOTES
Patient spoke with CCC RN on 6/7/21 and discussed goals     CHW delegations: None    Next outreach due: 7/6/21

## 2021-07-22 ENCOUNTER — RECORDS - HEALTHEAST (OUTPATIENT)
Dept: LAB | Facility: CLINIC | Age: 71
End: 2021-07-22

## 2021-07-22 DIAGNOSIS — Z12.31 OTHER SCREENING MAMMOGRAM: ICD-10-CM

## 2021-07-27 ENCOUNTER — TELEPHONE (OUTPATIENT)
Dept: FAMILY MEDICINE | Facility: CLINIC | Age: 71
End: 2021-07-27

## 2021-07-27 DIAGNOSIS — I10 ESSENTIAL HYPERTENSION, BENIGN: ICD-10-CM

## 2021-07-27 NOTE — TELEPHONE ENCOUNTER
Ramon calling for Bing, the nurse came out and did blood work today, her A1c was 6.0 they wanted them to call the primary to have it documented  since it was slightly elevated . No further action needed

## 2021-07-28 ENCOUNTER — PATIENT OUTREACH (OUTPATIENT)
Dept: NURSING | Facility: CLINIC | Age: 71
End: 2021-07-28

## 2021-07-28 NOTE — LETTER
[facility]       July 28, 2021    Bing Rodrigues  [address]  [address] [place] [address]    Dear Bing,    Your Care Team congratulates you on your journey to maintain wellness. This document will help guide you on your journey to maintain a healthy lifestyle.  You can use this to help you overcome any barriers you may encounter.  If you should have any questions or concerns, you can contact the members of your Care Team or contact your Primary Care Clinic for assistance.     Health Maintenance  Health Maintenance Reviewed:      My Access Plan  Medical Emergency 911   Primary Clinic Line   -     24 Hour Appointment Line [phone] or  [phone] ([phone]) (toll-free)   24 Hour Nurse Line [phone] (toll-free)   Preferred Urgent Care     Preferred Hospital     Preferred Pharmacy [facility] [address] [facility] - [place], [place] [facility] [address]     Behavioral Health Crisis Line The National Suicide Prevention Lifeline at 1-102.959.7264 or 911     My Care Team Members  Patient Care Team       Relationship Specialty Notifications Start End    Binta Wheeler MD PCP - General   3/12/20     Phone: [phone] Fax: [phone]         [address] [address]    Binta Wheeler MD Assigned PCP   6/16/21     Phone: [phone] Fax: [phone]         [address] [address]    Gagan Quinn MD Assigned Heart and Vascular Provider   7/16/21     Phone: [phone] Fax: [phone]         [address] [place] [address] [address] [place] [address]                     It has been your Clinic Care Team's pleasure to work with you on your goals.    Regards,  Your Clinic Care Team

## 2021-07-28 NOTE — PROGRESS NOTES
"Clinic Care Coordination Contact    Assessment: Care Coordinator contacted patient for 2 month follow up.  Patient has continued to follow the plan of care and assessment is negative for any new needs or concerns.    Enrollment status: Graduated.      Plan: No further outreaches at this time.  Patient will continue to follow the plan of care.  If new needs arise a new Care Coordination referral may be placed.  FYI to PCP    Patient currently living in the [facility] [place] where she is getting twice weekly assistance with her cares and weekly RN monitoring. Patient's  said a staff members checks on them daily. Patient has a \"Life Alert\" system she wears at all times and staff are available 24 hours in case of an emergency. Patient's  agreed to move her to graduation and he feels he has they have the support they currently need.     "

## 2021-07-31 NOTE — TELEPHONE ENCOUNTER
"Routing refill request to provider for review/approval because:  Labs out of range:  CR    ___________________________________________________________    Copy of Last Rx:        Last office visit with provider:  6/3/21   ___________________________________________________________    Requested Prescriptions   Pending Prescriptions Disp Refills     amLODIPine (NORVASC) 5 MG tablet 90 tablet 2     Si tablet (5 mg) by Per G Tube route daily       Calcium Channel Blockers Protocol  Failed - 2021 10:21 AM        Failed - Normal serum creatinine on file in past 12 months     Recent Labs   Lab Test 21  1330   CR 1.19*       Ok to refill medication if creatinine is low          Passed - Blood pressure under 140/90 in past 12 months     BP Readings from Last 3 Encounters:   21 118/80   21 (!) 138/90   21 127/71                 Passed - Recent (12 mo) or future (30 days) visit within the authorizing provider's specialty     Patient has had an office visit with the authorizing provider or a provider within the authorizing providers department within the previous 12 mos or has a future within next 30 days. See \"Patient Info\" tab in inbasket, or \"Choose Columns\" in Meds & Orders section of the refill encounter.              Passed - Medication is active on med list        Passed - Patient is age 18 or older        Passed - No active pregnancy on record        Passed - No positive pregnancy test in past 12 months             Urmila Albrecht RN 21 11:43 PM  "

## 2021-08-02 RX ORDER — AMLODIPINE BESYLATE 5 MG/1
5 TABLET ORAL DAILY
Qty: 90 TABLET | Refills: 2 | Status: SHIPPED | OUTPATIENT
Start: 2021-08-02 | End: 2022-04-26

## 2021-08-03 PROBLEM — J69.0 ASPIRATION PNEUMONIA (H): Status: RESOLVED | Noted: 2018-08-15 | Resolved: 2020-12-10

## 2021-08-03 PROBLEM — J69.0 ASPIRATION PNEUMONITIS (H): Status: RESOLVED | Noted: 2018-08-15 | Resolved: 2020-06-30

## 2021-08-04 ENCOUNTER — TELEPHONE (OUTPATIENT)
Dept: FAMILY MEDICINE | Facility: CLINIC | Age: 71
End: 2021-08-04

## 2021-08-04 NOTE — TELEPHONE ENCOUNTER
Barbie from radiology scheduling is calling and requesting order for pt to have her G Tube changed. Pt's  had contacted her to get Bing scheduled. Barbie can be reached at [phone] will any questions.  Pt's contact number is [phone].

## 2021-08-09 ENCOUNTER — TELEPHONE (OUTPATIENT)
Dept: FAMILY MEDICINE | Facility: CLINIC | Age: 71
End: 2021-08-09
Payer: COMMERCIAL

## 2021-08-09 ENCOUNTER — TELEPHONE (OUTPATIENT)
Dept: FAMILY MEDICINE | Facility: CLINIC | Age: 71
End: 2021-08-09

## 2021-08-09 DIAGNOSIS — Z93.1 GASTROSTOMY PRESENT (H): ICD-10-CM

## 2021-08-09 DIAGNOSIS — R53.81 PHYSICAL DECONDITIONING: Primary | ICD-10-CM

## 2021-08-09 DIAGNOSIS — R13.12 OROPHARYNGEAL DYSPHAGIA: Primary | ICD-10-CM

## 2021-08-09 NOTE — TELEPHONE ENCOUNTER
Reason for Call:  Other call back    Detailed comments: Recvd call from /Oscar, he is requesting Dr Binta Wheeler to place an order for pt/Bing to have her G-tube changed at [facility].    Phone Number Patient can be reached at: Home number on file [phone] (home)    Best Time: anytime    Can we leave a detailed message on this number? YES    Call taken on 8/9/2021 at 10:51 AM by Nydia Collazo

## 2021-08-09 NOTE — TELEPHONE ENCOUNTER
Reason for Call:  Home Health Care    Maria De Jesus with [facility] living called regarding (reason for call): orders    Orders are needed for this patient. PT / OT    PT: Eval and Treat     OT: Eval and Treat        Pt Provider: Dr. Wheeler     Phone Number Homecare Nurse can be reached at: [phone]  Fax number [phone]    Can we leave a detailed message on this number? YES      Call taken on 8/9/2021 at 1:56 PM by Noa Maire

## 2021-08-13 ENCOUNTER — HOSPITAL ENCOUNTER (EMERGENCY)
Facility: HOSPITAL | Age: 71
Discharge: HOME | End: 2021-08-13
Attending: EMERGENCY MEDICINE | Admitting: EMERGENCY MEDICINE
Payer: COMMERCIAL

## 2021-08-13 VITALS
WEIGHT: 130 LBS | BODY MASS INDEX: 25.52 KG/M2 | DIASTOLIC BLOOD PRESSURE: 70 MMHG | RESPIRATION RATE: 20 BRPM | HEIGHT: 60 IN | SYSTOLIC BLOOD PRESSURE: 150 MMHG | TEMPERATURE: 98 F | HEART RATE: 54 BPM | OXYGEN SATURATION: 96 %

## 2021-08-13 DIAGNOSIS — K94.23 GASTROSTOMY TUBE DYSFUNCTION (H): ICD-10-CM

## 2021-08-13 PROCEDURE — 99283 EMERGENCY DEPT VISIT LOW MDM: CPT | Mod: 25

## 2021-08-13 PROCEDURE — 43762 RPLC GTUBE NO REVJ TRC: CPT

## 2021-08-13 ASSESSMENT — MIFFLIN-ST. JEOR: SCORE: 1021.18

## 2021-08-13 NOTE — ED TRIAGE NOTES
Patient arrives via [facility], coming from home, where she resides with her , independently.  EMS was called this morning because her  tried to flush her G tube this morning, it wouldn't flush and it also was leaking.

## 2021-08-13 NOTE — ED PROVIDER NOTES
EMERGENCY DEPARTMENT ENCOUNTER      NAME: Bing Rodrigues  AGE: 71 year old female  YOB: 1950  MRN: [id number]  EVALUATION DATE & TIME: 8/13/2021  9:55 AM    PCP: Binta Wheeler    ED PROVIDER: Shaniqua Berkowitz MD    Chief Complaint   Patient presents with     Clogged G Tube         FINAL IMPRESSION:  1. Gastrostomy tube dysfunction (H)          ED COURSE & MEDICAL DECISION MAKING:    Pertinent Labs & Imaging studies reviewed. 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  On exam the Of her G-tube is cracked and broken.    G-tube replaced, please see procedure note.  Discharged home.         10:05 AM I met with the patient for the initial interview and physical examination. Discussed plan for treatment and workup in the ED.   10:23 AM I talked with the patient's RN.  10:34 AM Discussed with the patient and all questioned fully answered. She will call me if any problems arise.      At the conclusion of the encounter I discussed the results of all of the tests and the disposition. The questions were answered. The patient or family acknowledged understanding and was agreeable with the care plan.    Personal Protective Equipment: goggles, N-95 mask      MEDICATIONS GIVEN IN THE EMERGENCY:  Medications - No data to display    NEW PRESCRIPTIONS STARTED AT TODAY'S ER VISIT  New Prescriptions    No medications on file          =================================================================    HPI    Patient information was obtained from:    Use of Intrepreter: N/A     Bing Rodrigues is a 71 year old female with pertinent medical history of urinary incontinence, hypertension, CKD stage 3, ischemic cerebrovascular disease with  cerebral degeneration, reactive depression, and osteopenia who presents to the ED via EMS for evaluation of clogged G  tube.    Earlier this morning (8/13), the patient's G tube was clogged and leaking. Her  attempted to flush it without success. The patient has had the G tube in since March, and uses it for feeding.    REVIEW OF SYSTEMS  Constitutional:  Denies fever, chills, weight loss or weakness  Respiratory: No SOB, wheeze or cough  Cardiovascular:  No CP, palpitations  GI:  Denies abdominal pain, nausea, vomiting, diarrhea  Neurologic:  Denies headache, focal weakness or sensory changes  All other systems negative unless noted in HPI.      PAST MEDICAL HISTORY:  Past Medical History:   Diagnosis Date     Hypertension      Pneumonia      Stroke (H)        PAST SURGICAL HISTORY:  Past Surgical History:   Procedure Laterality Date     G TUBE REPLACEMENT  8/14/2019     G TUBE REPLACEMENT  11/13/2019     G TUBE REPLACEMENT  1/23/2020     G TUBE REPLACEMENT  4/29/2020     G TUBE REPLACEMENT  7/30/2020     G TUBE REPLACEMENT  10/26/2020     G TUBE REPLACEMENT  1/26/2021     G TUBE REPLACEMENT  5/4/2021     HC DILATION/CURETTAGE DIAG/THER NON OB      Description: Dilation And Curettage;  Recorded: 02/07/2013;     IR GASTRO JEJUNOSTOMY TUBE CHANGE  1/17/2019     IR GASTRO JEJUNOSTOMY TUBE CHANGE  4/18/2019     IR GASTRO JEJUNOSTOMY TUBE PLACEMENT  8/20/2018     IR GASTROSTOMY TUBE CHANGE  8/14/2019     IR GASTROSTOMY TUBE CHANGE  11/13/2019     IR GASTROSTOMY TUBE CHANGE  1/23/2020     IR GASTROSTOMY TUBE CHANGE  4/29/2020     IR GASTROSTOMY TUBE CHANGE  7/30/2020     IR GASTROSTOMY TUBE CHANGE  10/26/2020     IR GASTROSTOMY TUBE CHANGE  1/26/2021     IR GASTROSTOMY TUBE CHANGE  5/4/2021     IR GJ TUBE REPLACEMENT  1/17/2019     IR GJ TUBE REPLACEMENT  4/18/2019       CURRENT MEDICATIONS:    Prior to Admission Medications   Prescriptions Last Dose Informant Patient Reported? Taking?   NUTRISOURCE FIBER Powd   Yes No   Sig: [NUTRISOURCE FIBER POWD] DISSOLVE 4 TEASPOONSFUL IN WATER AND GIVE PER G TUBE TWICE DAILY   acetaminophen  "(TYLENOL) 500 mg/15.62 mL solution   No No   Sig: [ACETAMINOPHEN (TYLENOL) 500 MG/15.62 ML SOLUTION] 19.99 mL (640 mg total) by G-tube route every 4 (four) hours as needed for fever or pain.   amLODIPine (NORVASC) 5 MG tablet   No No   Si tablet (5 mg) by Per G Tube route daily   aspirin 81 mg chewable tablet   No No   Sig: [ASPIRIN 81 MG CHEWABLE TABLET] 1 tablet (81 mg total) by G-tube route daily.   calcium, as carbonate, (TUMS) 200 mg calcium (500 mg) chewable tablet   No No   Sig: [CALCIUM, AS CARBONATE, (TUMS) 200 MG CALCIUM (500 MG) CHEWABLE TABLET] 2 tablets (400 mg total) by G-tube route 4 (four) times a day as needed for heartburn.   cholecalciferol, vitamin D3, 1,000 unit tablet   No No   Sig: [CHOLECALCIFEROL, VITAMIN D3, 1,000 UNIT TABLET] 1 tablet (1,000 Units total) by G-tube route daily.   citalopram (CELEXA) 20 MG tablet   No No   Sig: [CITALOPRAM (CELEXA) 20 MG TABLET] Take 1 tablet (20 mg total) by mouth daily.   famotidine (PEPCID) 40 mg/5 mL (8 mg/mL) oral suspension   No No   Sig: [FAMOTIDINE (PEPCID) 40 MG/5 ML (8 MG/ML) ORAL SUSPENSION] 2.5 mL (20 mg total) by G-tube route 2 (two) times a day as needed for heartburn.   feeding container and pump set Misc   No No   Sig: [FEEDING CONTAINER AND PUMP SET MISC] Use 30 Bags As Directed every 30 (thirty) days. [place] Matt Enteral Feeding Pump Set 1\" 1000 ml 30 bags per month   guar gum (BENEFIBER;NUTRISOURCE) Pack powder packet   No No   Sig: [GUAR GUM (BENEFIBER;NUTRISOURCE) PACK POWDER PACKET] 2 packets by Enteral Tube route 2 (two) times a day.   loperamide (IMODIUM) 1 mg/5 mL solution   No No   Sig: [LOPERAMIDE (IMODIUM) 1 MG/5 ML SOLUTION] 10 mL (2 mg total) by G-tube route daily as needed for diarrhea.   melatonin 3 mg Tab tablet   No No   Sig: [MELATONIN 3 MG TAB TABLET] 1 tablet (3 mg total) by Enteral Tube route at bedtime as needed.   nut.tx.impaired digest fxn (PEPTAMEN 1.5) 0.068 gram- 1.5 kcal/mL Liqd   No No   Sig: " [NUT.TX.IMPAIRED DIGEST FXN (PEPTAMEN 1.5) 0.068 GRAM- 1.5 KCAL/ML LIQD] Take 1 Bottle by mouth 4 (four) times a day. Peptamen 1.5 @@ 40 ml/hr for 22 hours then off for 2 hours.  Total of 520 CC over 24 hours   omeprazole (PRILOSEC) 2 mg/mL SusR suspension   No No   Sig: [OMEPRAZOLE (PRILOSEC) 2 MG/ML SUSR SUSPENSION] 10 mL (20 mg total) by Enteral Tube route daily before breakfast.   omeprazole (PRILOSEC) 20 MG capsule   No No   Sig: [OMEPRAZOLE (PRILOSEC) 20 MG CAPSULE] PLEASE SEE ATTACHED FOR DETAILED DIRECTIONS   syringe, ENFit, non-sterile (MONOJECT ENFIT SYRINGE) 60 mL Syrg   No No   Sig: [SYRINGE, ENFIT, NON-STERILE (MONOJECT ENFIT SYRINGE) 60 ML SYRG] Use 1 Syringe As Directed as needed. 30 CC water bolus after before and after feedings   syringe, ENFit, sterile (MONOJECT ENFIT SYRINGE) 12 mL Syrg   No No   Sig: [SYRINGE, ENFIT, STERILE (MONOJECT ENFIT SYRINGE) 12 ML SYRG] Use 1 Syringe As Directed as needed. Monoject enteral syringe female      Facility-Administered Medications: None       ALLERGIES:  No Known Allergies    FAMILY HISTORY:  Family History   Problem Relation Age of Onset     Cerebrovascular Disease Maternal Grandmother      Cerebrovascular Disease Maternal Grandfather      Early Death Paternal Grandmother      Chronic Obstructive Pulmonary Disease Paternal Grandfather      Cancer Father         from smoking       SOCIAL HISTORY:  Social History     Tobacco Use     Smoking status: Never Smoker     Smokeless tobacco: Never Used   Substance Use Topics     Alcohol use: No     Drug use: No        VITALS:  Patient Vitals for the past 24 hrs:   BP Temp Temp src Pulse Resp SpO2 Height Weight   08/13/21 1004 (!) 150/70 98  F (36.7  C) Oral 54 20 96 % 1.524 m (5') 59 kg (130 lb)       PHYSICAL EXAM    General Appearance: Well-appearing, well-nourished, no acute distress   Head:  Normocephalic  Eyes:  conjunctiva/corneas clear  ENT:  membranes are moist without pallor  Cardio:  Regular rate and  rhythm  Pulm:  No respiratory distress  Abdomen:  Soft, non-tender, non distended,no rebound or guarding. G tube in place. Small granulation tissue around stoma site. One of two ports, the cap to cover the port is cracked and broken.  Extremities: Extremities atraumatic  Skin:  Skin warm, dry, no rashes  Neuro:  Alert and oriented ×3      PROCEDURES:    PROCEDURE: Gastrostomy Tube Replacement   INDICATIONS:  Tube dependent   PROCEDURE PROVIDER: Dr. Berkowitz   SITE: Anterior abdominal wall   TYPE/SIZE:  10 [place]   MEDICATION: None   NOTE:  The replacement tube balloon was tested by insufflation of air and found to be intact. Tube was then lubricated with a sterile water-based lubricant.  The patient's nonfunctioning gastrostomy tube was within the abdominal gastrostomy tract.  The previously placed gastrostomy tube was removed and a replacement tube of same size and type was quickly exchanged and passed without difficulty to the same depth as his normal tube position. The balloon was then inflated with 10 ml of air and pulled back gently until snug with the ring advanced down to the skin level. No significant leak was noted. Usual tube dressing was placed after position was secured.   Tube position was verified at bedside by aspiration of gastric contents.             The creation of this record is based on the scribe s observations of the work being performed by Shaniqua Berkowitz MD and the provider s statements to them. It was created on his behalf by Chelo Tucker, a trained medical scribe. This document has been checked and approved by the attending provider.    Shaniqua Berkowitz MD  Emergency Medicine  [facility] EMERGENCY DEPARTMENT  [facility] [id number] [phone]  Dept: [phone]       Shaniqua Berkowitz MD  08/13/21 1038

## 2021-08-13 NOTE — ED NOTES
asst'd to restroom via w/c by NT.   reports [place] [facility] [place] [facility] [place]. Pt reports feeling good.

## 2021-08-13 NOTE — ED NOTES
Bed: JNED-04  Expected date: 8/13/21  Expected time:   Means of arrival: Ambulance  Comments:  Tip  71 F  G tube out

## 2021-08-25 RX ORDER — DIAPER,BRIEF,INFANT-TODD,DISP
1 EACH MISCELLANEOUS
COMMUNITY
Start: 2020-07-30

## 2021-08-25 RX ORDER — NUT.TX.IMPAIRED DIGESTIVE FXN 0.068G-1.5
1 LIQUID (ML) ORAL
COMMUNITY
Start: 2020-07-30

## 2021-08-26 ENCOUNTER — VIRTUAL VISIT (OUTPATIENT)
Dept: FAMILY MEDICINE | Facility: CLINIC | Age: 71
End: 2021-08-26
Payer: COMMERCIAL

## 2021-08-26 DIAGNOSIS — R05.9 COUGH: Primary | ICD-10-CM

## 2021-08-26 PROCEDURE — 99442 PR PHYSICIAN TELEPHONE EVALUATION 11-20 MIN: CPT | Performed by: FAMILY MEDICINE

## 2021-08-26 NOTE — PROGRESS NOTES
Bing is a 71 year old who is being evaluated via a billable telephone visit.      What phone number would you like to be contacted at? [phone]  How would you like to obtain your AVS? MyChart    Assessment & Plan     Cough  Patient's cough is a chronic cough and probably related to her G-tube placement with a history of reflux.  Her medications were reviewed here on the phone with her .  She did vomit x1 4 days ago but since that time has been fine; her bowels are moving her urine output is adequate and I did interview her on the phone and she was communicable.  Past medical history of stroke chronic kidney disease reflux cognitive impairment all taken into account.  If situation changes with regards to any of these parameters patient will present to the emergency room I had  repeat our watchful approach               BMI:   Estimated body mass index is 25.39 kg/m  as calculated from the following:    Height as of 8/13/21: 1.524 m (5').    Weight as of 8/13/21: 59 kg (130 lb).           No follow-ups on file.    Francisco Grimaldo MD  [facility]   Bing is a 71 year old who presents for the following health issues     HPI telephone visit conducted between the patient myself and her  with regards to an episode of vomiting on Sunday x1 with review of a chronic cough situation.  Patient has history of CVA chronic kidney disease cognitive impairment and has a G-tube placement.  She is putting out urine her bowels are moving she is not nauseated and her level of consciousness and communication appear to be stable.  Patient is a little tired but this appears to be normal state.  We reviewed with the  and with the patient to watch for these signs that were aforementioned to see if there is any change in her status and at that time would recommend an emergency room visit or a recall to either Dr. Wheeler or to myself or to Dr. Wen who also has seen all  of the family.  I am comfortable that the condition is a chronic one and I feel the family just needed reassurance reassurance at this point          Review of Systems   Constitutional, HEENT, cardiovascular, pulmonary, gi and gu systems are negative, except as otherwise noted.      Objective           Vitals:  No vitals were obtained today due to virtual visit.    Physical Exam     PSYCH: Alert and oriented times 3; coherent speech, normal   rate and volume, able to articulate logical thoughts, able   to abstract reason, no tangential thoughts, no hallucinations   or delusions  Her affect is normal and pleasant  RESP: No cough, no audible wheezing, able to talk in full sentences  Remainder of exam unable to be completed due to telephone visits                Phone call duration: 11 minutes

## 2021-10-12 ENCOUNTER — OFFICE VISIT (OUTPATIENT)
Dept: FAMILY MEDICINE | Facility: CLINIC | Age: 71
End: 2021-10-12
Payer: COMMERCIAL

## 2021-10-12 VITALS — HEART RATE: 62 BPM | SYSTOLIC BLOOD PRESSURE: 137 MMHG | DIASTOLIC BLOOD PRESSURE: 77 MMHG | OXYGEN SATURATION: 97 %

## 2021-10-12 DIAGNOSIS — R73.09 ELEVATED GLUCOSE: Primary | ICD-10-CM

## 2021-10-12 DIAGNOSIS — Z43.1 ATTENTION TO G-TUBE (H): ICD-10-CM

## 2021-10-12 DIAGNOSIS — R79.89 ELEVATED SERUM CREATININE: ICD-10-CM

## 2021-10-12 LAB
ANION GAP SERPL CALCULATED.3IONS-SCNC: 12 MMOL/L (ref 5–18)
BUN SERPL-MCNC: 29 MG/DL (ref 8–28)
CALCIUM SERPL-MCNC: 9.6 MG/DL (ref 8.5–10.5)
CHLORIDE BLD-SCNC: 105 MMOL/L (ref 98–107)
CO2 SERPL-SCNC: 24 MMOL/L (ref 22–31)
CREAT SERPL-MCNC: 0.98 MG/DL (ref 0.6–1.1)
GFR SERPL CREATININE-BSD FRML MDRD: 58 ML/MIN/1.73M2
GLUCOSE BLD-MCNC: 103 MG/DL (ref 70–125)
HBA1C MFR BLD: 5.6 % (ref 0–5.6)
POTASSIUM BLD-SCNC: 4.5 MMOL/L (ref 3.5–5)
SODIUM SERPL-SCNC: 141 MMOL/L (ref 136–145)

## 2021-10-12 PROCEDURE — 83036 HEMOGLOBIN GLYCOSYLATED A1C: CPT | Performed by: FAMILY MEDICINE

## 2021-10-12 PROCEDURE — 99213 OFFICE O/P EST LOW 20 MIN: CPT | Performed by: FAMILY MEDICINE

## 2021-10-12 PROCEDURE — 80048 BASIC METABOLIC PNL TOTAL CA: CPT | Performed by: FAMILY MEDICINE

## 2021-10-12 PROCEDURE — 36415 COLL VENOUS BLD VENIPUNCTURE: CPT | Performed by: FAMILY MEDICINE

## 2021-10-12 NOTE — PROGRESS NOTES
Assessment & Plan      g-tube evaluation:  Appears to be functioning per pt and family endorsement. Limited/mild skin break down inferior to device, recommended keeping it dry, replacing it on schedule, and using antibacterial ointment.     Elevated glucose  - Hemoglobin A1c  - Basic metabolic panel  (Ca, Cl, CO2, Creat, Gluc, K, Na, BUN)    Elevated serum creatinine  - Basic metabolic panel  (Ca, Cl, CO2, Creat, Gluc, K, Na, BUN)       BMI:   Estimated body mass index is 25.39 kg/m  as calculated from the following:    Height as of 8/13/21: 1.524 m (5').    Weight as of 8/13/21: 59 kg (130 lb).       No follow-ups on file.    Binta Wheeler MD  [facility] is a 71 year old who presents for the following health issues  accompanied by her spouse:    HPI     F/U: leakage of GI tube  Pt was seen in August for broken g-tube in the emergency department. It was replaced without difficulty. Pt will be due for another within the month. It was a challenging appt given both the patient's and the spouse's cognitive and speech struggles. Apparently the g-tube is still functional. There is some skin break down, a 'cut' just below the tube. It leaks slightly. Otherwise, no concerns.       Pt mentioned a 'cyst' that caused someone to want her to be screened for diabetes (?). Unclear history.         Objective    /77   Pulse 62   SpO2 97%   There is no height or weight on file to calculate BMI.  Physical Exam   Gen: NAD  Neuro: obvious dysarthria  MSK: presents in wheelchair  Skin: trace skin breakdown without weeping or ulceration inferior to g-tube placement

## 2021-10-22 ENCOUNTER — TELEPHONE (OUTPATIENT)
Dept: FAMILY MEDICINE | Facility: CLINIC | Age: 71
End: 2021-10-22

## 2021-10-22 DIAGNOSIS — R13.12 OROPHARYNGEAL DYSPHAGIA: Primary | ICD-10-CM

## 2021-10-22 DIAGNOSIS — Z93.1 GASTROSTOMY PRESENT (H): ICD-10-CM

## 2021-10-22 NOTE — TELEPHONE ENCOUNTER
G tube change is necessary per  Oscar.     Order placed for [place] REPLACE G TUBE, W/O IMAGE OR ENDO GUIDE, NOT REQ REV OF GASTROSTOMY TRACT [[id number]] (Order [id number])     has been contacted. He will contact [facility] to schedule an appt.

## 2021-11-02 ENCOUNTER — TELEPHONE (OUTPATIENT)
Dept: FAMILY MEDICINE | Facility: CLINIC | Age: 71
End: 2021-11-02

## 2021-11-02 NOTE — TELEPHONE ENCOUNTER
Patient's  calls in leaving a message on our  line stating that his wife needs a new order for G-tube replacement.  Patient does not a patient that has ever been seen by our clinic but I do see records that Binta Wheeler MD has placed orders for this in the past.  This message is being routed over to her office.    Cinthia Donnelly RN

## 2021-11-03 ENCOUNTER — TELEPHONE (OUTPATIENT)
Dept: FAMILY MEDICINE | Facility: CLINIC | Age: 71
End: 2021-11-03

## 2021-11-03 DIAGNOSIS — R13.12 OROPHARYNGEAL DYSPHAGIA: Primary | ICD-10-CM

## 2021-11-03 NOTE — TELEPHONE ENCOUNTER
Patients  is calling stating he needs a referral or order to schedule for patient to get her GI tube replaced at [facility].    Please place orders

## 2021-11-03 NOTE — TELEPHONE ENCOUNTER
I am sorry, I am not comfortable ordering this on a patient who has not been seen for this in our clinic

## 2021-11-05 DIAGNOSIS — Z11.59 ENCOUNTER FOR SCREENING FOR OTHER VIRAL DISEASES: ICD-10-CM

## 2021-11-08 ENCOUNTER — LAB (OUTPATIENT)
Dept: LAB | Facility: CLINIC | Age: 71
End: 2021-11-08
Attending: FAMILY MEDICINE
Payer: COMMERCIAL

## 2021-11-08 DIAGNOSIS — Z11.59 ENCOUNTER FOR SCREENING FOR OTHER VIRAL DISEASES: ICD-10-CM

## 2021-11-08 PROCEDURE — U0003 INFECTIOUS AGENT DETECTION BY NUCLEIC ACID (DNA OR RNA); SEVERE ACUTE RESPIRATORY SYNDROME CORONAVIRUS 2 (SARS-COV-2) (CORONAVIRUS DISEASE [COVID-19]), AMPLIFIED PROBE TECHNIQUE, MAKING USE OF HIGH THROUGHPUT TECHNOLOGIES AS DESCRIBED BY CMS-2020-01-R: HCPCS

## 2021-11-08 PROCEDURE — U0005 INFEC AGEN DETEC AMPLI PROBE: HCPCS

## 2021-11-09 LAB — SARS-COV-2 RNA RESP QL NAA+PROBE: NEGATIVE

## 2021-11-12 ENCOUNTER — HOSPITAL ENCOUNTER (OUTPATIENT)
Dept: RADIOLOGY | Facility: HOSPITAL | Age: 71
Discharge: HOME | End: 2021-11-12
Attending: FAMILY MEDICINE | Admitting: FAMILY MEDICINE
Payer: COMMERCIAL

## 2021-11-12 DIAGNOSIS — R13.12 OROPHARYNGEAL DYSPHAGIA: ICD-10-CM

## 2021-11-12 PROCEDURE — 49450 REPLACE G/C TUBE PERC: CPT

## 2021-11-12 RX ADMIN — DIATRIZOATE MEGLUMINE AND DIATRIZOATE SODIUM 120 ML: 660; 100 SOLUTION ORAL; RECTAL at 09:24

## 2021-12-29 DIAGNOSIS — F06.30 MOOD DISORDER DUE TO KNOWN PHYSIOLOGICAL CONDITION, UNSPECIFIED: ICD-10-CM

## 2021-12-30 RX ORDER — CITALOPRAM HYDROBROMIDE 10 MG/1
TABLET ORAL
Qty: 90 TABLET | Refills: 3 | Status: SHIPPED | OUTPATIENT
Start: 2021-12-30 | End: 2022-09-09

## 2021-12-31 NOTE — TELEPHONE ENCOUNTER
"Last Written Prescription Date:  12/10/2020  Last Fill Quantity: 90,  # refills: 3   Last office visit provider:  10/12/2021     Requested Prescriptions   Pending Prescriptions Disp Refills     citalopram (CELEXA) 10 MG tablet [Pharmacy Med Name: CITALOPRAM HBR 10 MG TABLET] 90 tablet 3     Sig: TAKE 1 TABLET BY G-TUBE ROUTE DAILY.       SSRIs Protocol Passed - 12/29/2021 12:28 AM        Passed - Recent (12 mo) or future (30 days) visit within the authorizing provider's specialty     Patient has had an office visit with the authorizing provider or a provider within the authorizing providers department within the previous 12 mos or has a future within next 30 days. See \"Patient Info\" tab in inbasket, or \"Choose Columns\" in Meds & Orders section of the refill encounter.              Passed - Medication is active on med list        Passed - Patient is age 18 or older        Passed - No active pregnancy on record        Passed - No positive pregnancy test in last 12 months             Binta Estrada RN 12/30/21 11:27 PM  "

## 2022-01-14 DIAGNOSIS — B37.2 YEAST INFECTION OF THE SKIN: Primary | ICD-10-CM

## 2022-01-14 RX ORDER — CLOTRIMAZOLE 1 %
CREAM (GRAM) TOPICAL 2 TIMES DAILY
Qty: 14 G | Refills: 1 | Status: SHIPPED | OUTPATIENT
Start: 2022-01-14 | End: 2022-12-14

## 2022-01-14 NOTE — TELEPHONE ENCOUNTER
Home care nurse suggested that Bing has a yeast infection on the skin around the feeding tube. They recommend an anti fungal cream be sent over to patient's pharmacy.     Medication jodi'd up. Feel free to edit the prescription if needed.

## 2022-02-22 ENCOUNTER — TELEPHONE (OUTPATIENT)
Dept: FAMILY MEDICINE | Facility: CLINIC | Age: 72
End: 2022-02-22
Payer: COMMERCIAL

## 2022-02-22 DIAGNOSIS — B37.2 CANDIDIASIS OF SKIN: Primary | ICD-10-CM

## 2022-02-22 RX ORDER — NYSTATIN 100000 U/G
CREAM TOPICAL 2 TIMES DAILY
Qty: 30 G | Refills: 1 | Status: SHIPPED | OUTPATIENT
Start: 2022-02-22 | End: 2022-05-20

## 2022-02-22 NOTE — TELEPHONE ENCOUNTER
Reason for Call:  Other call back    Detailed comments: Recvd call from spouse/Oscar, he is calling in regards to yeast at the incision area of the feeding tube. Pls prescribe medication for this    Any questions, please reach out to Oscar    Phone Number Patient can be reached at: Home number on file [phone] (home)    Best Time: anytime    Can we leave a detailed message on this number? YES    Call taken on 2/22/2022 at 9:58 AM by Nydia Collazo

## 2022-03-31 ENCOUNTER — HOSPITAL ENCOUNTER (EMERGENCY)
Facility: HOSPITAL | Age: 72
Discharge: HOME | End: 2022-03-31
Admitting: PHYSICIAN ASSISTANT
Payer: COMMERCIAL

## 2022-03-31 ENCOUNTER — NURSE TRIAGE (OUTPATIENT)
Dept: NURSING | Facility: CLINIC | Age: 72
End: 2022-03-31
Payer: COMMERCIAL

## 2022-03-31 VITALS
HEART RATE: 56 BPM | RESPIRATION RATE: 20 BRPM | OXYGEN SATURATION: 97 % | SYSTOLIC BLOOD PRESSURE: 164 MMHG | TEMPERATURE: 98.6 F | DIASTOLIC BLOOD PRESSURE: 74 MMHG

## 2022-03-31 DIAGNOSIS — Z93.1 GASTROSTOMY PRESENT (H): Primary | ICD-10-CM

## 2022-03-31 DIAGNOSIS — K94.23 LEAKING PERCUTANEOUS ENDOSCOPIC GASTROSTOMY (PEG) TUBE (H): Primary | ICD-10-CM

## 2022-03-31 DIAGNOSIS — K94.20 COMPLICATION OF FEEDING TUBE (H): ICD-10-CM

## 2022-03-31 PROCEDURE — 99281 EMR DPT VST MAYX REQ PHY/QHP: CPT

## 2022-03-31 ASSESSMENT — ENCOUNTER SYMPTOMS
ABDOMINAL PAIN: 0
FEVER: 0
VOMITING: 0

## 2022-03-31 NOTE — ED TRIAGE NOTES
Patient lives in assisted living, connects her tube feedings. He states it doesn't leak when he is administering the feedings but when he disconnects the tubing it starts leaking. Patient denies pain, nausea, vomiting, diarrhea, shortness of breath. Patient has a chronic cough.

## 2022-03-31 NOTE — TELEPHONE ENCOUNTER
Called radiology department and they had an appt today at 3:00 but patient's  was unable to find a ride in time for the appt. Spoke with radiology to see if there was anything open tomorrow for patient. Patient's  stated that he is unable to stop the leakage from the g-tube and is holding it in place with tape, patient's  states that he can not wait until tomorrow. Consulted with Dr. Lee and patient was directed to ER for evaluation.

## 2022-03-31 NOTE — TELEPHONE ENCOUNTER
Patient's  called wanting updated information. I told him that the doctor is aware and working on a plan. I told him that someone will reach out to him with updated info shortly.

## 2022-03-31 NOTE — DISCHARGE INSTRUCTIONS
Please call [facility] tomorrow after 7 AM at [phone] to schedule to have tube replaced. In the meantime, use the provided cap.

## 2022-03-31 NOTE — TELEPHONE ENCOUNTER
Can you call Interventional radiology to help schedule an appointment for patient to have G tube evaluated and replaced if needed?  Order is placed

## 2022-03-31 NOTE — ED PROVIDER NOTES
EMERGENCY DEPARTMENT ENCOUNTER      NAME: Bing Rodrigues  AGE: 72 year old female  YOB: 1950  MRN: [id number]  EVALUATION DATE & TIME: 3/31/2022  3:48 PM    PCP: Binta Wheeler    ED PROVIDER: Cinthia Leiva PA-C      Chief Complaint   Patient presents with     Feeding Tube Leaking         FINAL IMPRESSION:  1. Complication of feeding tube (H)          MEDICAL DECISION MAKING:    Pertinent Labs & Imaging studies reviewed. 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  notes it only leaks when tube feeding is stopped. She does continuous feeds throughout the night.    Vitals reviewed and notable for elevated blood pressure. On exam, G tube to epigastric region with hole in the cap to the tube feeding port. No surrounding erythema, induration, discharge. Abdomen is soft and non-tender.     Apparently no replacement G tubes to be found. Did find an adapter that I was able to cut the cap off of and replace with the broken cap on her current itzel button that is causing the leak when her tube feedings are not in process. Patient was given the number for scheduling with IR and instructed to call after 7 AM tomorrow morning to get scheduled for replacement. We discussed return precautions and patient discharged home in stable condition.     0 minutes of critical care time     ED COURSE:  4:31 PM I met with the patient, obtained history, performed an initial exam, and discussed options and plan for diagnostics and treatment here in the ED.  5:40 PM RN called stores and IR. No replacement Itzel buttons or g tubes.   5:59 PM Spoke with Dr. Pacheco with IR. Options include scheduling outpatient and doing it tomorrow or replacing with standard G tube for now.   6:19 PM Patient  discharged after being provided with extensive anticipatory guidance and given return precautions, importance of IR follow-up emphasized.    At the conclusion of the encounter I discussed the results of all of the tests and the disposition. The questions were answered. The patient and family acknowledged understanding and were agreeable with the care plan.     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     Patient uses a 18 Cameroonian itzel g tube for tube feeds. Since this morning, G tube has been leaking due to a hole in the tubing. Patient denies any abdominal pain, fevers, vomiting.     REVIEW OF SYSTEMS:  Review of Systems   Constitutional: Negative for fever.   Gastrointestinal: Negative for abdominal pain and vomiting.        + leaking G tube   All other systems reviewed and are negative.      PAST MEDICAL HISTORY:  Past Medical History:   Diagnosis Date     Hypertension      Pneumonia      Stroke (H)        PAST SURGICAL HISTORY:  Past Surgical History:   Procedure Laterality Date     G TUBE REPLACEMENT  8/14/2019     G TUBE REPLACEMENT  11/13/2019     G TUBE REPLACEMENT  1/23/2020     G TUBE REPLACEMENT  4/29/2020     G TUBE REPLACEMENT  7/30/2020     G TUBE REPLACEMENT  10/26/2020     G TUBE REPLACEMENT  1/26/2021     G TUBE REPLACEMENT  5/4/2021     HC DILATION/CURETTAGE DIAG/THER NON OB      Description: Dilation And Curettage;  Recorded:  02/07/2013;     IR GASTRO JEJUNOSTOMY TUBE CHANGE  1/17/2019     IR GASTRO JEJUNOSTOMY TUBE CHANGE  4/18/2019     IR GASTRO JEJUNOSTOMY TUBE PLACEMENT  8/20/2018     IR GASTROSTOMY TUBE CHANGE  8/14/2019     IR GASTROSTOMY TUBE CHANGE  11/13/2019     IR GASTROSTOMY TUBE CHANGE  1/23/2020     IR GASTROSTOMY TUBE CHANGE  4/29/2020     IR GASTROSTOMY TUBE CHANGE  7/30/2020     IR GASTROSTOMY TUBE CHANGE  10/26/2020     IR GASTROSTOMY TUBE CHANGE  1/26/2021     IR GASTROSTOMY TUBE CHANGE  5/4/2021     IR GJ TUBE REPLACEMENT  1/17/2019     IR GJ TUBE REPLACEMENT  4/18/2019           CURRENT MEDICATIONS:    No current facility-administered medications for this encounter.    Current Outpatient Medications:      acetaminophen (TYLENOL) 500 mg/15.62 mL solution, [ACETAMINOPHEN (TYLENOL) 500 MG/15.62 ML SOLUTION] 19.99 mL (640 mg total) by G-tube route every 4 (four) hours as needed for fever or pain., Disp: , Rfl: 0     amLODIPine (NORVASC) 5 MG tablet, 1 tablet (5 mg) by Per G Tube route daily, Disp: 90 tablet, Rfl: 2     aspirin 81 mg chewable tablet, [ASPIRIN 81 MG CHEWABLE TABLET] 1 tablet (81 mg total) by G-tube route daily., Disp: , Rfl: 0     calcium, as carbonate, (TUMS) 200 mg calcium (500 mg) chewable tablet, [CALCIUM, AS CARBONATE, (TUMS) 200 MG CALCIUM (500 MG) CHEWABLE TABLET] 2 tablets (400 mg total) by G-tube route 4 (four) times a day as needed for heartburn., Disp: , Rfl: 0     cholecalciferol, vitamin D3, 1,000 unit tablet, [CHOLECALCIFEROL, VITAMIN D3, 1,000 UNIT TABLET] 1 tablet (1,000 Units total) by G-tube route daily., Disp: , Rfl: 0     citalopram (CELEXA) 10 MG tablet, TAKE 1 TABLET BY G-TUBE ROUTE DAILY., Disp: 90 tablet, Rfl: 3     citalopram (CELEXA) 20 MG tablet, Take 1 tablet (20 mg) by mouth daily, Disp: 90 tablet, Rfl: 3     clotrimazole (LOTRIMIN) 1 % external cream, Apply topically 2 times daily on the affected skin around feeding tube., Disp: 14 g, Rfl: 1     Enteral Nutrition Supplies  "(MONOJECT ENTERAL SYRINGE/60ML) MISC, 1 Syringe, Disp: , Rfl:      famotidine (PEPCID) 40 mg/5 mL (8 mg/mL) oral suspension, [FAMOTIDINE (PEPCID) 40 MG/5 ML (8 MG/ML) ORAL SUSPENSION] 2.5 mL (20 mg total) by G-tube route 2 (two) times a day as needed for heartburn., Disp: 450 mL, Rfl: 3     feeding container and pump set Misc, [FEEDING CONTAINER AND PUMP SET MISC] Use 30 Bags As Directed every 30 (thirty) days. [facility]i [facility]rez Enteral Feeding Pump Set 1\" 1000 ml 30 bags per month, Disp: 1 each, Rfl: 0     guar gum (BENEFIBER;NUTRISOURCE) Pack powder packet, [GUAR GUM (BENEFIBER;NUTRISOURCE) PACK POWDER PACKET] 2 packets by Enteral Tube route 2 (two) times a day., Disp: , Rfl: 0     loperamide (IMODIUM) 1 mg/5 mL solution, [LOPERAMIDE (IMODIUM) 1 MG/5 ML SOLUTION] 10 mL (2 mg total) by G-tube route daily as needed for diarrhea., Disp: , Rfl: 0     melatonin 3 mg Tab tablet, [MELATONIN 3 MG TAB TABLET] 1 tablet (3 mg total) by Enteral Tube route at bedtime as needed., Disp: , Rfl: 0     nut.tx.impaired digest fxn (PEPTAMEN 1.5) 0.068 gram- 1.5 kcal/mL Liqd, [NUT.TX.IMPAIRED DIGEST FXN (PEPTAMEN 1.5) 0.068 GRAM- 1.5 KCAL/ML LIQD] Take 1 Bottle by mouth 4 (four) times a day. Peptamen 1.5 @@ 40 ml/hr for 22 hours then off for 2 hours.  Total of 520 CC over 24 hours, Disp: 120 Bottle, Rfl: 30     NUTRISOURCE FIBER Powd, [NUTRISOURCE FIBER POWD] DISSOLVE 4 TEASPOONSFUL IN WATER AND GIVE PER G TUBE TWICE DAILY, Disp: , Rfl:      Nutritional Supplements (PEPTAMEN 1.5 SHIVANI) LIQD, Take 1 Bottle by mouth, Disp: , Rfl:      nystatin (MYCOSTATIN) 785202 UNIT/GM external cream, Apply topically 2 times daily, Disp: 30 g, Rfl: 1     omeprazole (PRILOSEC) 2 mg/mL SusR suspension, [OMEPRAZOLE (PRILOSEC) 2 MG/ML SUSR SUSPENSION] 10 mL (20 mg total) by Enteral Tube route daily before breakfast., Disp: 300 mL, Rfl: 3     omeprazole (PRILOSEC) 20 MG capsule, [OMEPRAZOLE (PRILOSEC) 20 MG CAPSULE] PLEASE SEE ATTACHED FOR DETAILED DIRECTIONS, " Disp: 90 capsule, Rfl: 3     syringe, ENFit, non-sterile (MONOJECT ENFIT SYRINGE) 60 mL Syrg, [SYRINGE, ENFIT, NON-STERILE (MONOJECT ENFIT SYRINGE) 60 ML SYRG] Use 1 Syringe As Directed as needed. 30 CC water bolus after before and after feedings, Disp: 30 Syringe, Rfl: 11     syringe, ENFit, sterile (MONOJECT ENFIT SYRINGE) 12 mL Syrg, [SYRINGE, ENFIT, STERILE (MONOJECT ENFIT SYRINGE) 12 ML SYRG] Use 1 Syringe As Directed as needed. Monoject enteral syringe female, Disp: 30 Syringe, Rfl: 11      ALLERGIES:  No Known Allergies    FAMILY HISTORY:  Family History   Problem Relation Age of Onset     Cerebrovascular Disease Maternal Grandmother      Cerebrovascular Disease Maternal Grandfather      Early Death Paternal Grandmother      Chronic Obstructive Pulmonary Disease Paternal Grandfather      Cancer Father         from smoking       SOCIAL HISTORY:   Social History     Socioeconomic History     Marital status:      Spouse name: Not on file     Number of children: Not on file     Years of education: Not on file     Highest education level: Not on file   Occupational History     Not on file   Tobacco Use     Smoking status: Never Smoker     Smokeless tobacco: Never Used   Substance and Sexual Activity     Alcohol use: No     Drug use: No     Sexual activity: Not Currently     Partners: Male   Other Topics Concern     Not on file   Social History Narrative     Not on file     Social Determinants of Health     Financial Resource Strain: Not on file   Food Insecurity: Not on file   Transportation Needs: Not on file   Physical Activity: Not on file   Stress: Not on file   Social Connections: Not on file   Intimate Partner Violence: Not on file   Housing Stability: Not on file       VITALS:  Patient Vitals for the past 24 hrs:   BP Temp Temp src Pulse Resp SpO2   03/31/22 1558 (!) 164/74 98.6  F (37  C) Oral 56 20 97 %       PHYSICAL EXAM  Constitutional: Well developed, Well nourished, NAD  HENT: Normocephalic,  Atraumatic, Bilateral external ears normal, wearing mask in light of covid-19 pandemic.  Neck: Supple, No stridor.  Eyes: Conjunctiva normal, No discharge.   Respiratory: Normal breath sounds, No respiratory distress, No wheezing, Speaks full sentences easily. No cough.  Cardiovascular: Normal heart rate, Regular rhythm, No murmurs, No rubs, No gallops. Chest wall nontender.  GI: Soft, No tenderness, No masses, No flank tenderness. No rebound or guarding. G tube to epigastric region with hole near port. No surrounding erythema, induration, discharge.   Musculoskeletal: No edema. No cyanosis, No clubbing. Good range of motion in all major joints.  Integument: Warm, Dry, No erythema, No rash. No petechiae.  Neurologic: Alert & oriented x 3, Normal motor function, Normal sensory function, No focal deficits noted. Normal gait.  Psychiatric: Affect normal, Judgment normal, Mood normal. Cooperative.      Cinthia Leiva PA-C  Emergency Medicine  [facility]  3/31/2022       Cinthia Leiva PA-C  04/01/22 0814

## 2022-03-31 NOTE — TELEPHONE ENCOUNTER
"Nurse Triage SBAR    Is this a 2nd Level Triage? YES, LICENSED PRACTITIONER REVIEW IS REQUIRED    Situation: Patient's  calling - verbal consent given by patient.   Says her feeding tube is leaking.    Background: Feeding tube started leaking today.      Assessment:  Patient is otherwise doing fine.  No difficulty breathing.  No abdominal pain.  No vomiting.  No diarrhea.  Patient is hydrated.    Protocol Recommended Disposition:   See PCP Within 4 Hours (or PCP Triage)    Recommendation: Triaged to disposition of See PCP Within 4 Hours.  Per protocol - patient should be seen in clinic within 4 hours.   is concerned clinic will not be able to repair/replace g-tube.    Message to PCP care team: Where should patient be seen for this?      Routed to provider     12:58 pm no response on 2LT message sent high priority to clinic pool.  Call placed to clinic asking that this be addressed.  1:29 pm no response on 2LT message sent.  Second call placed to clinic.    Does the patient meet one of the following criteria for ADS visit consideration? 16+ years old, with an [facility] PCP     TIP  Providers, please consider if this condition is appropriate for management at one of our Acute and Diagnostic Services sites.     If patient is a good candidate, please use dotphrase <dot>triageresponse and select Refer to ADS to document.    Domitila Gonzalez, RAOUL  Triage Nurse Advisor      Additional Information    Negative: Shock suspected (e.g., cold/pale/clammy skin, too weak to stand, low BP, rapid pulse)    Negative: [1] Shortness of breath AND [2] new onset    Negative: Bluish (or gray) lips or face now    Negative: Sounds like a life-threatening emergency to the triager    Negative: SEVERE abdominal pain    Negative: [1] Vomiting AND [2] contains red blood or black (\"coffee ground\") material  (Exception: few red streaks in vomit that only happened once)    Negative: [1] Vomiting AND [2] contains bile (green color)    Negative: " "Tube contains red blood or black (\"coffee ground\") material  (Exception: pink tinge of tube fluid occurs once and clears immediately with irrigation.)    Negative: G-tube, J-tube, or GJ-tube came completely out of site in abdominal wall    Negative: Difficulty breathing    Negative: Dehydration suspected (e.g., no urine > 12 hours, very dry mouth, lightheaded)    Negative: Patient sounds very sick or weak to the triager    Negative: Vomiting > 4 times in the past 4 hours    Negative: Diarrhea > 4 times in the past 4 hours    Negative: [1] G-tube is clogged AND [2] irrigation has been attempted without success    [1] G-tube is broken or cracked AND [2] is not usable    Protocols used: FEEDING TUBE SYMPTOMS AND [id number]      "

## 2022-04-01 ENCOUNTER — HOSPITAL ENCOUNTER (OUTPATIENT)
Dept: RADIOLOGY | Facility: HOSPITAL | Age: 72
Discharge: HOME | End: 2022-04-01
Attending: FAMILY MEDICINE | Admitting: RADIOLOGY
Payer: COMMERCIAL

## 2022-04-01 ENCOUNTER — PATIENT OUTREACH (OUTPATIENT)
Dept: CARE COORDINATION | Facility: CLINIC | Age: 72
End: 2022-04-01
Payer: COMMERCIAL

## 2022-04-01 DIAGNOSIS — Z93.1 GASTROSTOMY PRESENT (H): ICD-10-CM

## 2022-04-01 DIAGNOSIS — Z71.89 OTHER SPECIFIED COUNSELING: ICD-10-CM

## 2022-04-01 PROCEDURE — 49450 REPLACE G/C TUBE PERC: CPT

## 2022-04-01 RX ADMIN — DIATRIZOATE MEGLUMINE AND DIATRIZOATE SODIUM 5 ML: 660; 100 SOLUTION ORAL; RECTAL at 11:41

## 2022-04-01 NOTE — PROGRESS NOTES
Clinic Care Coordination Contact    Background: Care Coordination referral placed from [facility] discharge report for reason of patient meeting criteria for a TCM outreach call by [facility] team.    Assessment: Upon chart review, [facility] Team member will cancel/close the referral for TCM outreach due to reason below:    Patient has a follow up appointment with an appropriate provider today for hospital discharge    Plan: Care Coordination referral for TCM outreach canceled.    Graciela Palmer MA  [facility]

## 2022-04-23 DIAGNOSIS — I10 ESSENTIAL HYPERTENSION, BENIGN: ICD-10-CM

## 2022-04-26 ENCOUNTER — TELEPHONE (OUTPATIENT)
Dept: FAMILY MEDICINE | Facility: CLINIC | Age: 72
End: 2022-04-26
Payer: COMMERCIAL

## 2022-04-26 RX ORDER — AMLODIPINE BESYLATE 5 MG/1
TABLET ORAL
Qty: 90 TABLET | Refills: 2 | Status: SHIPPED | OUTPATIENT
Start: 2022-04-26 | End: 2022-10-20

## 2022-04-26 NOTE — TELEPHONE ENCOUNTER
Reason for Call:  Medication or medication refill:    Do you use a [facility]?  Name of the pharmacy and phone number for the current request:  [id number] - [place], [address]  [phone]  Name of the medication requested: amLODIPine (NORVASC) 5 MG tablet    Other request: none    Can we leave a detailed message on this number? YES    Phone number patient can be reached at: Home number on file [phone] (home)    Best Time: any    Call taken on 4/26/2022 at 9:30 AM by Grace Dickey

## 2022-04-26 NOTE — TELEPHONE ENCOUNTER
"Routing refill request to provider for review/approval because:  Blood Pressure out of range.     Last Written Prescription Date:  08/02/21  Last Fill Quantity: 90,  # refills: 2   Last office visit provider:  10/12/21     Requested Prescriptions   Pending Prescriptions Disp Refills     amLODIPine (NORVASC) 5 MG tablet [Pharmacy Med Name: AMLODIPINE BESYLATE 5 MG TAB] 90 tablet 2     Sig: TAKE 1 TABLET (5 MG) BY PER G TUBE ROUTE DAILY       Calcium Channel Blockers Protocol  Failed - 4/23/2022  8:01 AM        Failed - Blood pressure under 140/90 in past 12 months     BP Readings from Last 3 Encounters:   03/31/22 (!) 164/74   10/12/21 137/77   08/13/21 (!) 150/70                 Passed - Recent (12 mo) or future (30 days) visit within the authorizing provider's specialty     Patient has had an office visit with the authorizing provider or a provider within the authorizing providers department within the previous 12 mos or has a future within next 30 days. See \"Patient Info\" tab in inbasket, or \"Choose Columns\" in Meds & Orders section of the refill encounter.              Passed - Medication is active on med list        Passed - Patient is age 18 or older        Passed - No active pregnancy on record        Passed - Normal serum creatinine on file in past 12 months     Recent Labs   Lab Test 10/12/21  1136   CR 0.98       Ok to refill medication if creatinine is low          Passed - No positive pregnancy test in past 12 months             Sienna Gandhi RN 04/26/22 9:47 AM  "

## 2022-05-20 DIAGNOSIS — B37.2 CANDIDIASIS OF SKIN: ICD-10-CM

## 2022-05-20 RX ORDER — NYSTATIN 100000 U/G
CREAM TOPICAL
Qty: 30 G | Refills: 1 | Status: SHIPPED | OUTPATIENT
Start: 2022-05-20 | End: 2023-02-27

## 2022-06-06 ENCOUNTER — TRANSFERRED RECORDS (OUTPATIENT)
Dept: HEALTH INFORMATION MANAGEMENT | Facility: CLINIC | Age: 72
End: 2022-06-06

## 2022-07-07 ENCOUNTER — TELEPHONE (OUTPATIENT)
Dept: FAMILY MEDICINE | Facility: CLINIC | Age: 72
End: 2022-07-07

## 2022-07-07 DIAGNOSIS — Z93.1 GASTROSTOMY PRESENT (H): Primary | ICD-10-CM

## 2022-07-07 NOTE — TELEPHONE ENCOUNTER
Patient's  called and requested a referral. The patient needs a tube replaced in her abdomen.     Referral for Gastroenterologist - at [facility] for XR GASTROSTOMY TUBE REPLACEMENT per 's request.     I informed the  that this referral was already in the chart, but he stated she needed another one.     *sending to covering provider - Loreta.

## 2022-07-22 ENCOUNTER — HOSPITAL ENCOUNTER (OUTPATIENT)
Dept: RADIOLOGY | Facility: HOSPITAL | Age: 72
Discharge: HOME | End: 2022-07-22
Attending: STUDENT IN AN ORGANIZED HEALTH CARE EDUCATION/TRAINING PROGRAM | Admitting: RADIOLOGY
Payer: COMMERCIAL

## 2022-07-22 DIAGNOSIS — Z93.1 GASTROSTOMY PRESENT (H): ICD-10-CM

## 2022-07-22 PROCEDURE — 49450 REPLACE G/C TUBE PERC: CPT

## 2022-07-22 RX ADMIN — DIATRIZOATE MEGLUMINE AND DIATRIZOATE SODIUM 120 ML: 660; 100 SOLUTION ORAL; RECTAL at 13:33

## 2022-09-09 ENCOUNTER — OFFICE VISIT (OUTPATIENT)
Dept: FAMILY MEDICINE | Facility: CLINIC | Age: 72
End: 2022-09-09
Payer: COMMERCIAL

## 2022-09-09 VITALS
HEART RATE: 58 BPM | TEMPERATURE: 98.5 F | OXYGEN SATURATION: 96 % | BODY MASS INDEX: 25.6 KG/M2 | WEIGHT: 131.1 LBS | SYSTOLIC BLOOD PRESSURE: 130 MMHG | DIASTOLIC BLOOD PRESSURE: 70 MMHG

## 2022-09-09 DIAGNOSIS — M25.662 JOINT STIFFNESS OF LEFT LOWER LEG: ICD-10-CM

## 2022-09-09 DIAGNOSIS — R25.2 LEG CRAMP: ICD-10-CM

## 2022-09-09 DIAGNOSIS — N18.30 STAGE 3 CHRONIC KIDNEY DISEASE, UNSPECIFIED WHETHER STAGE 3A OR 3B CKD (H): ICD-10-CM
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
## 2022-09-09 LAB
ALBUMIN SERPL BCG-MCNC: 4.5 G/DL (ref 3.5–5.2)
ALP SERPL-CCNC: 70 U/L (ref 35–104)
ALT SERPL W P-5'-P-CCNC: 34 U/L (ref 10–35)
ANION GAP SERPL CALCULATED.3IONS-SCNC: 10 MMOL/L (ref 7–15)
AST SERPL W P-5'-P-CCNC: 31 U/L (ref 10–35)
BILIRUB SERPL-MCNC: 0.7 MG/DL
BUN SERPL-MCNC: 34 MG/DL (ref 8–23)
CALCIUM SERPL-MCNC: 9.5 MG/DL (ref 8.8–10.2)
CHLORIDE SERPL-SCNC: 104 MMOL/L (ref 98–107)
CHOLEST SERPL-MCNC: 89 MG/DL
CREAT SERPL-MCNC: 1.01 MG/DL (ref 0.51–0.95)
DEPRECATED HCO3 PLAS-SCNC: 29 MMOL/L (ref 22–29)
ERYTHROCYTE [DISTWIDTH] IN BLOOD BY AUTOMATED COUNT: 14.1 % (ref 10–15)
GFR SERPL CREATININE-BSD FRML MDRD: 58 ML/MIN/1.73M2
GLUCOSE SERPL-MCNC: 114 MG/DL (ref 70–99)
HCT VFR BLD AUTO: 42.1 % (ref 35–47)
HDLC SERPL-MCNC: 52 MG/DL
HGB BLD-MCNC: 13.6 G/DL (ref 11.7–15.7)
LDLC SERPL CALC-MCNC: 21 MG/DL
MAGNESIUM SERPL-MCNC: 2.3 MG/DL (ref 1.7–2.3)
MCH RBC QN AUTO: 26.9 PG (ref 26.5–33)
MCHC RBC AUTO-ENTMCNC: 32.3 G/DL (ref 31.5–36.5)
MCV RBC AUTO: 83 FL (ref 78–100)
NONHDLC SERPL-MCNC: 37 MG/DL
PLATELET # BLD AUTO: 190 10E3/UL (ref 150–450)
POTASSIUM SERPL-SCNC: 3.7 MMOL/L (ref 3.4–5.3)
PROT SERPL-MCNC: 7.5 G/DL (ref 6.4–8.3)
RBC # BLD AUTO: 5.05 10E6/UL (ref 3.8–5.2)
SODIUM SERPL-SCNC: 143 MMOL/L (ref 136–145)
TRIGL SERPL-MCNC: 79 MG/DL
WBC # BLD AUTO: 10.3 10E3/UL (ref 4–11)

## 2022-09-09 PROCEDURE — G0008 ADMIN INFLUENZA VIRUS VAC: HCPCS | Performed by: FAMILY MEDICINE

## 2022-09-09 PROCEDURE — 80061 LIPID PANEL: CPT | Performed by: FAMILY MEDICINE

## 2022-09-09 PROCEDURE — 80053 COMPREHEN METABOLIC PANEL: CPT | Performed by: FAMILY MEDICINE

## 2022-09-09 PROCEDURE — 36415 COLL VENOUS BLD VENIPUNCTURE: CPT | Performed by: FAMILY MEDICINE

## 2022-09-09 PROCEDURE — 90662 IIV NO PRSV INCREASED AG IM: CPT | Performed by: FAMILY MEDICINE

## 2022-09-09 PROCEDURE — 85027 COMPLETE CBC AUTOMATED: CPT | Performed by: FAMILY MEDICINE

## 2022-09-09 PROCEDURE — 99213 OFFICE O/P EST LOW 20 MIN: CPT | Mod: 25 | Performed by: FAMILY MEDICINE

## 2022-09-09 PROCEDURE — 83735 ASSAY OF MAGNESIUM: CPT | Performed by: FAMILY MEDICINE

## 2022-09-09 ASSESSMENT — PATIENT HEALTH QUESTIONNAIRE - PHQ9
SUM OF ALL RESPONSES TO PHQ QUESTIONS 1-9: 0
10. IF YOU CHECKED OFF ANY PROBLEMS, HOW DIFFICULT HAVE THESE PROBLEMS MADE IT FOR YOU TO DO YOUR WORK, TAKE CARE OF THINGS AT HOME, OR GET ALONG WITH OTHER PEOPLE: NOT DIFFICULT AT ALL
SUM OF ALL RESPONSES TO PHQ QUESTIONS 1-9: 0

## 2022-09-09 ASSESSMENT — PAIN SCALES - GENERAL: PAINLEVEL: NO PAIN (0)

## 2022-09-09 NOTE — LETTER
September 11, 2022      Bing Rodrigues  [address]   [place] [address]        Dear ,    We are writing to inform you of your test results.    Overall, your lab results look good.    Resulted Orders   Lipid panel reflex to direct LDL Non-fasting   Result Value Ref Range    Cholesterol 89 <200 mg/dL    Triglycerides 79 <150 mg/dL    Direct Measure HDL 52 >=50 mg/dL    LDL Cholesterol Calculated 21 <=100 mg/dL    Non HDL Cholesterol 37 <130 mg/dL    Narrative    Cholesterol  Desirable:  <200 mg/dL    Triglycerides  Normal:  Less than 150 mg/dL  Borderline High:  150-199 mg/dL  High:  200-499 mg/dL  Very High:  Greater than or equal to 500 mg/dL    Direct Measure HDL  Female:  Greater than or equal to 50 mg/dL   Male:  Greater than or equal to 40 mg/dL    LDL Cholesterol  Desirable:  <100mg/dL  Above Desirable:  100-129 mg/dL   Borderline High:  130-159 mg/dL   High:  160-189 mg/dL   Very High:  >= 190 mg/dL    Non HDL Cholesterol  Desirable:  130 mg/dL  Above Desirable:  130-159 mg/dL  Borderline High:  160-189 mg/dL  High:  190-219 mg/dL  Very High:  Greater than or equal to 220 mg/dL   CBC with platelets   Result Value Ref Range    WBC Count 10.3 4.0 - 11.0 10e3/uL    RBC Count 5.05 3.80 - 5.20 10e6/uL    Hemoglobin 13.6 11.7 - 15.7 g/dL    Hematocrit 42.1 35.0 - 47.0 %    MCV 83 78 - 100 fL    MCH 26.9 26.5 - 33.0 pg    MCHC 32.3 31.5 - 36.5 g/dL    RDW 14.1 10.0 - 15.0 %    Platelet Count 190 150 - 450 10e3/uL   Comprehensive metabolic panel (BMP + Alb, Alk Phos, ALT, AST, Total. Bili, TP)   Result Value Ref Range    Sodium 143 136 - 145 mmol/L    Potassium 3.7 3.4 - 5.3 mmol/L    Creatinine 1.01 (H) 0.51 - 0.95 mg/dL    Urea Nitrogen 34.0 (H) 8.0 - 23.0 mg/dL    Chloride 104 98 - 107 mmol/L    Carbon Dioxide (CO2) 29 22 - 29 mmol/L    Anion Gap 10 7 - 15 mmol/L    Glucose 114 (H) 70 - 99 mg/dL    Calcium 9.5 8.8 - 10.2 mg/dL    Protein Total 7.5 6.4 - 8.3 g/dL    Albumin 4.5 3.5 - 5.2 g/dL     Bilirubin Total 0.7 <=1.2 mg/dL    Alkaline Phosphatase 70 35 - 104 U/L    AST 31 10 - 35 U/L    ALT 34 10 - 35 U/L    GFR Estimate 58 (L) >60 mL/min/1.73m2      Comment:      Effective December 21, 2021 eGFRcr in adults is calculated using the 2021 CKD-EPI creatinine equation which includes age and gender (Lui et al., NEJ, DOI: 10.1056/RSLBok1718347)   Magnesium   Result Value Ref Range    Magnesium 2.3 1.7 - 2.3 mg/dL       If you have any questions or concerns, please call the clinic at the number listed above.       Sincerely,      Sera Johnson MD

## 2022-09-09 NOTE — PROGRESS NOTES
Assessment & Plan     Fall, initial encounter  Unclear etiology of the fall.  Patient has difficulty describing exactly what happened.  Sounds like her leg may have stiffened up or may have even fallen asleep when she got up to ambulate.  She did not have any other strokelike symptoms and no loss of function of her left upper extremity.  At this point she has returned to her baseline status..  She has some minor abrasions on her lower extremities.  Recommend washing with mild soap and water and discussed other wound cares.    Stage 3 chronic kidney disease, unspecified whether stage 3a or 3b CKD (H)  Will check comprehensive metabolic panel to monitor her renal function and electrolytes.  - Comprehensive metabolic panel (BMP + Alb, Alk Phos, ALT, AST, Total. Bili, TP)  - Comprehensive metabolic panel (BMP + Alb, Alk Phos, ALT, AST, Total. Bili, TP)    Leg cramp  We will check some basic labs today to ensure that she does not have any electrolyte abnormalities that may be causing muscle cramps  - CBC with platelets  - Comprehensive metabolic panel (BMP + Alb, Alk Phos, ALT, AST, Total. Bili, TP)  - Magnesium  - CBC with platelets  - Comprehensive metabolic panel (BMP + Alb, Alk Phos, ALT, AST, Total. Bili, TP)  - Magnesium    Joint stiffness of left lower leg                     No follow-ups on file.    Sera Johnson MD  [facility]    Jamia Smart is a 72 year old, presenting for the following health issues:  fall (9/8/22 sore on right leg), Imm/Inj, and dry mouth (Patient is unable to take anything by mouth - wants to have ice to melt in her mouth)      Imm/Inj    She is seen today accompanied by her  for follow-up after a fall.  She has history of hemiplegia and hemiparesis following a stroke several years ago that affects her left nondominant side.  She oral pharyngeal dysphagia and history of aspiration pneumonia.  She has a G-tube and is NPO.  It is somewhat difficult to  understand her as she does have some speech difficulty following her stroke and this is my first time meeting her and her .  She uses a walker for ambulation.  Apparently last evening she got up and was using her walker and was unable to move her left leg.  States her legs felt very stiff.  She did not have weakness or paralysis of her left upper extremity or other strokelike symptoms.  She ended up falling to the floor.  They live in an assisted living facility and they called for assistance.  The medical aide came and helped her up and got her into bed.  She reports that she is able to move her leg this morning and has been able to ambulate with her walker and that she seems to be back to baseline.  She does have some abrasions on her legs that she would like checked today.  She did not have any other symptoms prior to the fall.  Did not feel dizzy or lightheaded.  No other concerns or questions.  Review of systems is otherwise negative          Review of Systems         Objective    /70 (BP Location: Right arm, Cuff Size: Adult Regular)   Pulse 58   Temp 98.5  F (36.9  C) (Temporal)   Wt 59.5 kg (131 lb 1.6 oz)   SpO2 96%   BMI 25.60 kg/m    Body mass index is 25.6 kg/m .  Physical Exam   GENERAL: no distress and elderly  RESP: lungs clear to auscultation - no rales, rhonchi or wheezes  CV: regular rate and rhythm, normal S1 S2, no S3 or S4, no murmur, click or rub, no peripheral edema and peripheral pulses strong  SKIN: some minor abrasions present on bilateral lower extremities just above the ankles  NEURO: dysarthria and gait abnormal: left hemipegia                    Answers for HPI/ROS submitted by the patient on 9/9/2022  If you checked off any problems, how difficult have these problems made it for you to do your work, take care of things at home, or get along with other people?: Not difficult at all  PHQ9 TOTAL SCORE: 0

## 2022-10-20 DIAGNOSIS — I10 ESSENTIAL HYPERTENSION, BENIGN: ICD-10-CM

## 2022-10-20 RX ORDER — AMLODIPINE BESYLATE 5 MG/1
TABLET ORAL
Qty: 90 TABLET | Refills: 2 | Status: SHIPPED | OUTPATIENT
Start: 2022-10-20 | End: 2023-02-28

## 2022-10-20 NOTE — TELEPHONE ENCOUNTER
Pending Prescriptions:                       Disp   Refills    amLODIPine (NORVASC) 5 MG tablet          90 tab*2          Pt has four tablets left.

## 2022-11-01 ENCOUNTER — TELEPHONE (OUTPATIENT)
Dept: FAMILY MEDICINE | Facility: CLINIC | Age: 72
End: 2022-11-01

## 2022-11-01 DIAGNOSIS — R13.12 OROPHARYNGEAL DYSPHAGIA: ICD-10-CM

## 2022-11-01 DIAGNOSIS — Z93.1 GASTROSTOMY PRESENT (H): Primary | ICD-10-CM

## 2022-11-01 NOTE — TELEPHONE ENCOUNTER
Pts  is calling requesting orders for a G tube replacement. Pt had this at done at [facility] and is unable to schedule until proper orders are placed.     Please place orders if appropriate.

## 2022-11-02 NOTE — TELEPHONE ENCOUNTER
Order placed as requested.  Please find out if they will be following up with Dr. Johnson for future visits or if they need to schedule a visit to establish care with a new provider.  SYEDJ

## 2022-11-14 ENCOUNTER — HOSPITAL ENCOUNTER (OUTPATIENT)
Dept: RADIOLOGY | Facility: HOSPITAL | Age: 72
Discharge: HOME | End: 2022-11-14
Attending: FAMILY MEDICINE | Admitting: RADIOLOGY
Payer: COMMERCIAL

## 2022-11-14 DIAGNOSIS — Z93.1 GASTROSTOMY PRESENT (H): ICD-10-CM

## 2022-11-14 DIAGNOSIS — R13.12 OROPHARYNGEAL DYSPHAGIA: ICD-10-CM

## 2022-11-14 PROCEDURE — 49450 REPLACE G/C TUBE PERC: CPT

## 2022-11-14 RX ADMIN — DIATRIZOATE MEGLUMINE AND DIATRIZOATE SODIUM 120 ML: 660; 100 SOLUTION ORAL; RECTAL at 13:50

## 2022-12-13 DIAGNOSIS — B37.2 YEAST INFECTION OF THE SKIN: ICD-10-CM

## 2022-12-13 NOTE — TELEPHONE ENCOUNTER
Pending Prescriptions:                       Disp   Refills    clotrimazole (LOTRIMIN) 1 % external cream14 g   1            Sig: Apply topically 2 times daily on the affected           skin around feeding tube.    Pt is out of ointment.

## 2022-12-14 RX ORDER — CLOTRIMAZOLE 1 %
CREAM (GRAM) TOPICAL 2 TIMES DAILY
Qty: 14 G | Refills: 1 | Status: SHIPPED | OUTPATIENT
Start: 2022-12-14 | End: 2023-02-27

## 2023-01-01 ENCOUNTER — TELEPHONE (OUTPATIENT)
Dept: FAMILY MEDICINE | Facility: CLINIC | Age: 73
End: 2023-01-01
Payer: COMMERCIAL

## 2023-02-03 ENCOUNTER — TELEPHONE (OUTPATIENT)
Dept: FAMILY MEDICINE | Facility: CLINIC | Age: 73
End: 2023-02-03
Payer: COMMERCIAL

## 2023-02-03 NOTE — TELEPHONE ENCOUNTER
Reason for Call:  Other prescription    Detailed comments: patients  Oscar calling stating the patient needs more supplies for her nutritional supplements. She does not need the nutritional supplements itself just all the supplies bags, syringes, etc.  states this goes through [facility]. He did not have a number for [facility] and I could not find anything in the patients chart regarding [facility]. I did google it and I think I have a number for [facility]. He did state that Dr Johnson has prescribed this previously and she would know where to send this to. Forwarded to covering provider and dr johnson.     Phone Number Patient can be reached at: Home number on file [phone] (home)    Best Time: anytime    Can we leave a detailed message on this number? NO.  states will not be able to answer voicemail, but does know when a call is missed.     Call taken on 2/3/2023 at 2:33 PM by Makeda Reyna

## 2023-02-04 NOTE — TELEPHONE ENCOUNTER
This patient is new to me (former patient of Liam)     I recommend contacting [facility] to see if they can fax us an order form for her supplies that I can sign

## 2023-02-06 NOTE — TELEPHONE ENCOUNTER
Call placed to [facility] at [phone] to re-order G tube supplies. They are sending an order renewal form through fax. Will watch for fax.

## 2023-02-08 ENCOUNTER — MEDICAL CORRESPONDENCE (OUTPATIENT)
Dept: HEALTH INFORMATION MANAGEMENT | Facility: CLINIC | Age: 73
End: 2023-02-08

## 2023-02-09 ENCOUNTER — MEDICAL CORRESPONDENCE (OUTPATIENT)
Dept: HEALTH INFORMATION MANAGEMENT | Facility: CLINIC | Age: 73
End: 2023-02-09

## 2023-02-09 ENCOUNTER — TELEPHONE (OUTPATIENT)
Dept: FAMILY MEDICINE | Facility: CLINIC | Age: 73
End: 2023-02-09
Payer: COMMERCIAL

## 2023-02-09 NOTE — ADDENDUM NOTE
Addended by: BABS HOOKER on: 11/5/2021 01:13 PM     Modules accepted: Orders     Hold coumadin 4 days prior to procedure and resume ASAP after when ok by the performing physician      Hematology/oncology  Dr Jaja Wade

## 2023-02-10 ENCOUNTER — MEDICAL CORRESPONDENCE (OUTPATIENT)
Dept: HEALTH INFORMATION MANAGEMENT | Facility: CLINIC | Age: 73
End: 2023-02-10

## 2023-02-27 ENCOUNTER — APPOINTMENT (OUTPATIENT)
Dept: CT IMAGING | Facility: HOSPITAL | Age: 73
End: 2023-02-27
Attending: EMERGENCY MEDICINE
Payer: COMMERCIAL

## 2023-02-27 ENCOUNTER — HOSPITAL ENCOUNTER (OUTPATIENT)
Facility: HOSPITAL | Age: 73
Setting detail: OBSERVATION
Discharge: HOME | End: 2023-02-28
Attending: EMERGENCY MEDICINE | Admitting: EMERGENCY MEDICINE
Payer: COMMERCIAL

## 2023-02-27 DIAGNOSIS — R26.2 UNABLE TO AMBULATE: ICD-10-CM

## 2023-02-27 DIAGNOSIS — Z93.1 GASTROSTOMY PRESENT (H): ICD-10-CM

## 2023-02-27 DIAGNOSIS — N30.00 ACUTE CYSTITIS WITHOUT HEMATURIA: ICD-10-CM

## 2023-02-27 DIAGNOSIS — I69.322 CVA, OLD, DYSARTHRIA: ICD-10-CM

## 2023-02-27 DIAGNOSIS — I10 ESSENTIAL HYPERTENSION, BENIGN: ICD-10-CM
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
## 2023-02-27 DIAGNOSIS — R41.89 COGNITIVE IMPAIRMENT: ICD-10-CM

## 2023-02-27 DIAGNOSIS — F32.9 REACTIVE DEPRESSION: ICD-10-CM

## 2023-02-27 DIAGNOSIS — R29.6 FALLS FREQUENTLY: Primary | ICD-10-CM

## 2023-02-27 DIAGNOSIS — M62.81 GENERALIZED MUSCLE WEAKNESS: ICD-10-CM

## 2023-02-27 PROBLEM — E46 MALNUTRITION, UNSPECIFIED TYPE (H): Status: ACTIVE | Noted: 2020-03-12

## 2023-02-27 LAB
ANION GAP SERPL CALCULATED.3IONS-SCNC: 10 MMOL/L (ref 7–15)
BUN SERPL-MCNC: 32.4 MG/DL (ref 8–23)
CALCIUM SERPL-MCNC: 9 MG/DL (ref 8.8–10.2)
CHLORIDE SERPL-SCNC: 107 MMOL/L (ref 98–107)
CK SERPL-CCNC: 40 U/L (ref 26–192)
CREAT SERPL-MCNC: 0.97 MG/DL (ref 0.51–0.95)
DEPRECATED HCO3 PLAS-SCNC: 27 MMOL/L (ref 22–29)
ERYTHROCYTE [DISTWIDTH] IN BLOOD BY AUTOMATED COUNT: 14.7 % (ref 10–15)
GFR SERPL CREATININE-BSD FRML MDRD: 61 ML/MIN/1.73M2
GLUCOSE SERPL-MCNC: 110 MG/DL (ref 70–99)
HCT VFR BLD AUTO: 41.7 % (ref 35–47)
HGB BLD-MCNC: 13.4 G/DL (ref 11.7–15.7)
MAGNESIUM SERPL-MCNC: 2 MG/DL (ref 1.7–2.3)
MCH RBC QN AUTO: 25.8 PG (ref 26.5–33)
MCHC RBC AUTO-ENTMCNC: 32.1 G/DL (ref 31.5–36.5)
MCV RBC AUTO: 80 FL (ref 78–100)
PLATELET # BLD AUTO: 227 10E3/UL (ref 150–450)
POTASSIUM SERPL-SCNC: 4.2 MMOL/L (ref 3.4–5.3)
RBC # BLD AUTO: 5.2 10E6/UL (ref 3.8–5.2)
SODIUM SERPL-SCNC: 144 MMOL/L (ref 136–145)
WBC # BLD AUTO: 11.1 10E3/UL (ref 4–11)

## 2023-02-27 PROCEDURE — 83735 ASSAY OF MAGNESIUM: CPT | Performed by: EMERGENCY MEDICINE

## 2023-02-27 PROCEDURE — 93005 ELECTROCARDIOGRAM TRACING: CPT | Performed by: EMERGENCY MEDICINE

## 2023-02-27 PROCEDURE — 250N000013 HC RX MED GY IP 250 OP 250 PS 637: Performed by: INTERNAL MEDICINE

## 2023-02-27 PROCEDURE — G0378 HOSPITAL OBSERVATION PER HR: HCPCS

## 2023-02-27 PROCEDURE — 82310 ASSAY OF CALCIUM: CPT | Performed by: EMERGENCY MEDICINE

## 2023-02-27 PROCEDURE — 70450 CT HEAD/BRAIN W/O DYE: CPT

## 2023-02-27 PROCEDURE — 99285 EMERGENCY DEPT VISIT HI MDM: CPT | Mod: 25

## 2023-02-27 PROCEDURE — 99223 1ST HOSP IP/OBS HIGH 75: CPT | Performed by: INTERNAL MEDICINE

## 2023-02-27 PROCEDURE — 36415 COLL VENOUS BLD VENIPUNCTURE: CPT | Performed by: EMERGENCY MEDICINE

## 2023-02-27 PROCEDURE — 85027 COMPLETE CBC AUTOMATED: CPT | Performed by: EMERGENCY MEDICINE

## 2023-02-27 PROCEDURE — 82550 ASSAY OF CK (CPK): CPT | Performed by: EMERGENCY MEDICINE

## 2023-02-27 PROCEDURE — 72125 CT NECK SPINE W/O DYE: CPT

## 2023-02-27 RX ORDER — CITALOPRAM HYDROBROMIDE 20 MG/1
20 TABLET ORAL DAILY
Status: DISCONTINUED | OUTPATIENT
Start: 2023-02-28 | End: 2023-02-28 | Stop reason: HOSPADM

## 2023-02-27 RX ORDER — ONDANSETRON 4 MG/1
4 TABLET, ORALLY DISINTEGRATING ORAL EVERY 6 HOURS PRN
Status: DISCONTINUED | OUTPATIENT
Start: 2023-02-27 | End: 2023-02-28 | Stop reason: HOSPADM

## 2023-02-27 RX ORDER — ASPIRIN 81 MG/1
81 TABLET, CHEWABLE ORAL DAILY
Status: DISCONTINUED | OUTPATIENT
Start: 2023-02-28 | End: 2023-02-28 | Stop reason: HOSPADM

## 2023-02-27 RX ORDER — VITAMIN B COMPLEX
50 TABLET ORAL DAILY
Status: DISCONTINUED | OUTPATIENT
Start: 2023-02-28 | End: 2023-02-28 | Stop reason: HOSPADM

## 2023-02-27 RX ORDER — AMLODIPINE BESYLATE 5 MG/1
5 TABLET ORAL DAILY
Status: DISCONTINUED | OUTPATIENT
Start: 2023-02-28 | End: 2023-02-28

## 2023-02-27 RX ORDER — HYDRALAZINE HYDROCHLORIDE 20 MG/ML
10 INJECTION INTRAMUSCULAR; INTRAVENOUS EVERY 4 HOURS PRN
Status: DISCONTINUED | OUTPATIENT
Start: 2023-02-27 | End: 2023-02-28 | Stop reason: HOSPADM

## 2023-02-27 RX ORDER — PROCHLORPERAZINE 25 MG
12.5 SUPPOSITORY, RECTAL RECTAL EVERY 12 HOURS PRN
Status: DISCONTINUED | OUTPATIENT
Start: 2023-02-27 | End: 2023-02-28 | Stop reason: HOSPADM

## 2023-02-27 RX ORDER — ONDANSETRON 2 MG/ML
4 INJECTION INTRAMUSCULAR; INTRAVENOUS EVERY 6 HOURS PRN
Status: DISCONTINUED | OUTPATIENT
Start: 2023-02-27 | End: 2023-02-28 | Stop reason: HOSPADM

## 2023-02-27 RX ORDER — PROCHLORPERAZINE MALEATE 5 MG
5 TABLET ORAL EVERY 6 HOURS PRN
Status: DISCONTINUED | OUTPATIENT
Start: 2023-02-27 | End: 2023-02-28 | Stop reason: HOSPADM

## 2023-02-27 RX ADMIN — ANORECTAL OINTMENT 1 G: 15.7; .44; 24; 20.6 OINTMENT TOPICAL at 22:30

## 2023-02-27 ASSESSMENT — ACTIVITIES OF DAILY LIVING (ADL)
ADLS_ACUITY_SCORE: 35
DEPENDENT_IADLS:: TRANSPORTATION
ADLS_ACUITY_SCORE: 35
ADLS_ACUITY_SCORE: 37

## 2023-02-27 NOTE — ED TRIAGE NOTES
Presents to ED via Ambulance from assisted living. Patient fell into her closet this morning at 0800 and was found by her  at 0850.     Patient does not know why she fell but denies LOC, c-spine tenderness with palpation, or taking blood thinners.    States she feels weak in both legs.     Patient has a history of left sided weakness s/p stroke and has a feeding tube.    Blood sugar for EMS was 110mg/dl and they placed a 20g IV in the left wrist, currently saline locked.

## 2023-02-27 NOTE — ED PROVIDER NOTES
EMERGENCY DEPARTMENT ENCOUNTER      NAME: Bing Rodrigues  AGE: 73 year old female  YOB: 1950  MRN: [id number]  EVALUATION DATE & TIME: 2/27/2023 12:04 PM    PCP: Sera Johnson    ED PROVIDER: Shaniqua Berkowitz MD      Chief Complaint   Patient presents with     Fall     Extremity Weakness         FINAL IMPRESSION:  1. Fall, initial encounter    2. Unable to ambulate    3. Generalized muscle weakness          ED COURSE & MEDICAL DECISION MAKING:    Pertinent Labs & Imaging studies reviewed. (See chart for details)  73 year old female with history of previous CVA with left-sided hemiplegia, dysphagia G-tube dependent for tube feeds, medications, CKD, HTN who presents to the Emergency Department for evaluation of unwitnessed fall, inability to ambulate thereafter.  Patient describes fall as mechanical, losing balance and falling into her closet when she was not using her walker.  Will obtain EKG and labs to rule out potential syncopal episode though I think this is less likely.  She denies any pain, head, neck etc. but given her age will obtain neuroimaging to rule out skull fracture, ICH or cervical spine fracture.    Patient placed on monitor, IV established and blood obtained.  A twelve-lead EKG was obtained showing sinus rhythm.  No ischemic changes.  CBC, BMP, magnesium, CK obtained and unremarkable.  CT imaging of the head and cervical spine unremarkable.  Patient given trial of ambulation.  She is unable to ambulate independently.  She is normally completely independent in the senior building, she does not currently get any assistance.  She is needing help to get up out of bed and help with walker and assist of 1.  This is certainly a new issue.   at bedside actually states that this has been kind of slow steady decline over some time.  Not safe for discharge to home.  Will admit for PT OT and potential placement.  Urinalysis pending at time of dictation.    ED Course as of 02/27/23 2036    Mon Feb 27, 2023   1317 I met with patient for initial interview and encounter. PPE worn includes exam gloves and surgical mask.    1534 Failed trial of ambulation   1558 No beds here, will no beds in system, will board here       Medical Decision Making    History:    Supplemental history from: Documented in chart, if applicable  at bedside    External Record(s) reviewed: Documented in chart, if applicable. and Other: Inpatient and Outpatient computer records reviewed.     Work Up:    Chart documentation includes differential considered and any EKGs or imaging independently interpreted by provider, where specified.    In additional to work up documented, I considered the following work up: Documented in chart, if applicable.    External consultation:    Discussion of management with another provider: Documented in chart, if applicable    Complicating factors:    Care impacted by chronic illness: Cerebrovascular Disease    Care affected by social determinants of health: Access to Medical Care    Disposition considerations: Admit.        At the conclusion of the encounter I discussed the results of all of the tests and the disposition. The questions were answered. The patient or family acknowledged understanding and was agreeable with the care plan.      CONSULTS:  Hospitalist      MEDICATIONS GIVEN IN THE EMERGENCY:  Medications - No data to display    NEW PRESCRIPTIONS STARTED AT TODAY'S ER VISIT  New Prescriptions    No medications on file          =================================================================    HPI    Patient information was obtained from: Patient     Use of Intrepreter: N/A        Bing Rodrigues is a 73 year old female with pertinent medical history of stroke, HTN, CKD stage 3, generalized weakness, cognitive impairment, who presents via EMS for evaluation of a fall. The patient had a fall this morning around 8 AM where she fell backwards into her closet. She landed on her butt.  "Denies loss of conscious or hitting her head. The patient reports that she fell because she \"could not reach walker\". She has left side weakness due to previous strokes. She has a G-tube intact for medication and food. The patient lives in an apartment with her  where they both do not get assistant. The patient denies, headache, back pain, neck pain, and any other complaints or concerns at the moment.       REVIEW OF SYSTEMS  Constitutional:  Denies fever, chills, weight loss or weakness, no loss of appetite, weakness,  Respiratory: No SOB, wheeze or cough, no hemoptysis  Cardiovascular:  No CP, DAMON, palpitations, syncope  GI:  Denies abdominal pain, nausea, vomiting, or dark, bloody stools. No diarrhea  Musculoskeletal:  Denies any new muscle/joint pain, swelling or loss of function.  Neurologic:  Denies headache, focal weakness or sensory changes, vertigo, seizure      PAST MEDICAL HISTORY:  Past Medical History:   Diagnosis Date     Hypertension      Pneumonia      Stroke (H)        PAST SURGICAL HISTORY:  Past Surgical History:   Procedure Laterality Date     G TUBE REPLACEMENT  8/14/2019     G TUBE REPLACEMENT  11/13/2019     G TUBE REPLACEMENT  1/23/2020     G TUBE REPLACEMENT  4/29/2020     G TUBE REPLACEMENT  7/30/2020     G TUBE REPLACEMENT  10/26/2020     G TUBE REPLACEMENT  1/26/2021     G TUBE REPLACEMENT  5/4/2021     HC DILATION/CURETTAGE DIAG/THER NON OB      Description: Dilation And Curettage;  Recorded: 02/07/2013;     IR GASTRO JEJUNOSTOMY TUBE CHANGE  1/17/2019     IR GASTRO JEJUNOSTOMY TUBE CHANGE  4/18/2019     IR GASTRO JEJUNOSTOMY TUBE PLACEMENT  8/20/2018     IR GASTROSTOMY TUBE CHANGE  8/14/2019     IR GASTROSTOMY TUBE CHANGE  11/13/2019     IR GASTROSTOMY TUBE CHANGE  1/23/2020     IR GASTROSTOMY TUBE CHANGE  4/29/2020     IR GASTROSTOMY TUBE CHANGE  7/30/2020     IR GASTROSTOMY TUBE CHANGE  10/26/2020     IR GASTROSTOMY TUBE CHANGE  1/26/2021     IR GASTROSTOMY TUBE CHANGE  " "2021     IR GJ TUBE REPLACEMENT  2019     IR GJ TUBE REPLACEMENT  2019           CURRENT MEDICATIONS:    Prior to Admission Medications   Prescriptions Last Dose Informant Patient Reported? Taking?   Enteral Nutrition Supplies (MONOJECT ENTERAL SYRINGE/60ML) MISC   Yes No   Si Syringe   NUTRISOURCE FIBER Powd   Yes No   Sig: [NUTRISOURCE FIBER POWD] DISSOLVE 4 TEASPOONSFUL IN WATER AND GIVE PER G TUBE TWICE DAILY   Nutritional Supplements (PEPTAMEN 1.5 SHIVANI) LIQD   Yes No   Sig: Take 1 Bottle by mouth   Patient not taking: Reported on 2022   acetaminophen (TYLENOL) 500 mg/15.62 mL solution   No No   Sig: [ACETAMINOPHEN (TYLENOL) 500 MG/15.62 ML SOLUTION] 19.99 mL (640 mg total) by G-tube route every 4 (four) hours as needed for fever or pain.   Patient not taking: Reported on 2022   amLODIPine (NORVASC) 5 MG tablet   No No   Sig: TAKE 1 TABLET (5 MG) BY PER G TUBE ROUTE DAILY Strength: 5 mg   aspirin 81 mg chewable tablet   No No   Sig: [ASPIRIN 81 MG CHEWABLE TABLET] 1 tablet (81 mg total) by G-tube route daily.   calcium, as carbonate, (TUMS) 200 mg calcium (500 mg) chewable tablet   No No   Sig: [CALCIUM, AS CARBONATE, (TUMS) 200 MG CALCIUM (500 MG) CHEWABLE TABLET] 2 tablets (400 mg total) by G-tube route 4 (four) times a day as needed for heartburn.   Patient not taking: Reported on 2022   cholecalciferol, vitamin D3, 1,000 unit tablet   No No   Sig: [CHOLECALCIFEROL, VITAMIN D3, 1,000 UNIT TABLET] 1 tablet (1,000 Units total) by G-tube route daily.   citalopram (CELEXA) 20 MG tablet   No No   Sig: Take 1 tablet (20 mg) by mouth daily   clotrimazole (LOTRIMIN) 1 % external cream   No No   Sig: Apply topically 2 times daily on the affected skin around feeding tube.   feeding container and pump set Misc   No No   Sig: [FEEDING CONTAINER AND PUMP SET MISC] Use 30 Bags As Directed every 30 (thirty) days. [facility] Enter[facility] Feeding Pump Set 1\" 1000 ml 30 bags per month   guar gum " (BENEFIBER;NUTRISOURCE) Pack powder packet   No No   Sig: [GUAR GUM (BENEFIBER;NUTRISOURCE) PACK POWDER PACKET] 2 packets by Enteral Tube route 2 (two) times a day.   loperamide (IMODIUM) 1 mg/5 mL solution   No No   Sig: [LOPERAMIDE (IMODIUM) 1 MG/5 ML SOLUTION] 10 mL (2 mg total) by G-tube route daily as needed for diarrhea.   Patient not taking: Reported on 9/9/2022   nut.tx.impaired digest fxn (PEPTAMEN 1.5) 0.068 gram- 1.5 kcal/mL Liqd   No No   Sig: [NUT.TX.IMPAIRED DIGEST FXN (PEPTAMEN 1.5) 0.068 GRAM- 1.5 KCAL/ML LIQD] Take 1 Bottle by mouth 4 (four) times a day. Peptamen 1.5 @@ 40 ml/hr for 22 hours then off for 2 hours.  Total of 520 CC over 24 hours   nystatin (MYCOSTATIN) 174703 UNIT/GM external cream   No No   Sig: APPLY TO AFFECTED AREA TWICE A DAY   syringe, ENFit, non-sterile (MONOJECT ENFIT SYRINGE) 60 mL Syrg   No No   Sig: [SYRINGE, ENFIT, NON-STERILE (MONOJECT ENFIT SYRINGE) 60 ML SYRG] Use 1 Syringe As Directed as needed. 30 CC water bolus after before and after feedings   syringe, ENFit, sterile (MONOJECT ENFIT SYRINGE) 12 mL Syrg   No No   Sig: [SYRINGE, ENFIT, STERILE (MONOJECT ENFIT SYRINGE) 12 ML SYRG] Use 1 Syringe As Directed as needed. Monoject enteral syringe female   Patient not taking: Reported on 9/9/2022      Facility-Administered Medications: None       ALLERGIES:  Allergies   Allergen Reactions     Nickel      Allergy to non-gold jewelry       FAMILY HISTORY:  Family History   Problem Relation Age of Onset     Cerebrovascular Disease Maternal Grandmother      Cerebrovascular Disease Maternal Grandfather      Early Death Paternal Grandmother      Chronic Obstructive Pulmonary Disease Paternal Grandfather      Cancer Father         from smoking       SOCIAL HISTORY:  Social History     Tobacco Use     Smoking status: Never     Smokeless tobacco: Never   Substance Use Topics     Alcohol use: No     Drug use: No        VITALS:  Patient Vitals for the past 24 hrs:   BP Temp Temp src  Pulse Resp SpO2 Weight   02/27/23 1315 (!) 170/94 -- -- 69 30 94 % --   02/27/23 1230 (!) 160/84 -- -- 73 -- 92 % --   02/27/23 1220 (!) 165/76 -- -- 72 -- 94 % --   02/27/23 1157 (!) 173/88 97.6  F (36.4  C) Oral 75 24 94 % 59 kg (130 lb)       PHYSICAL EXAM    Primary Survey:  A: intact  B: no respiratory distress, clear to asculatation bilateraly  C: regular rate, rhythm.    D: Renner Coma Scale    Eyes Verbal Motor  6 Obeys commands    5 Normal Localizes to pain    4 Opens spontaneously Confused or disoriented Withdrawal to pain  3 Opens to voice Inappropriate words Abnormal flexion to pain  2 Opens to pain Incomprehensible Abnormal extension to pain  1 Does not open eyes No sound No movement    Score 4 5 6    Total = 15  E: No obvious external injuries    Secondary Survey:  General Appearance:   Head:  Normocephalic, atraumatic  Eyes:  PERRL, conjunctiva/corneas clear, EOM's intact, no orbital injury  ENT:  No obvious facial deformity.  No tenderness to palpation.  No epistaxis.  Extraocular movements are intact.  No evidence of orbital injury.  Normal dentition.  Normal bite.  No malocclusion.  No oral pharyngeal bleeding no dysphonia or difficulty swallowing, membranes are moist without pallor, TM clear, there is no facial tenderness to palpation or deformity  Neck:  No midline cervical spine tenderness.  No paraspinal tenderness.  Supple, symmetrical, trachea midline, no stridor or dysphonia, no soft tissue swelling or tenderness  Chest:  No tenderness or deformity, no crepitus  Cardio:  Regular rate and rhythm, 2+ pulses symmetric in all extremities  Pulm:  Respirations unlabored, Clear to auscultation bilaterally  Back:  No midline tenderness to palpation of the thoracic or lumbar spine, no stepoffs or crepitus  Abdomen:  Soft, non-tender, non distended, no rebound or guarding  Extremities:  No obvious deformity, all joints palpated in place with full range of motion.  No tenderness or instability.   Patient is able to bear full weight, no tenderness over joints or extremities, no cyanosis or edema, full function and range of motion, pulses equal in all extremities, normal cap refill  Skin:  Skin color, texture, turgor normal, no rashes or lesions  Neuro:  Awake, alert, responsive to voice, follows commands, normal speech, baseline ambulation, GCS. Baseline dysarthria with mild left side weakness.      RADIOLOGY/LAB:  Reviewed all pertinent imaging. Please see official radiology report.  All pertinent labs reviewed and interpreted.  Results for orders placed or performed during the hospital encounter of 02/27/23   CT Head w/o Contrast    Impression    IMPRESSION:  1.  No intracranial hemorrhage, mass lesions, hydrocephalus or CT evidence for an acute infarct.  2.  Mild diffuse cerebral parenchymal volume loss. Presumed chronic hypertensive/microvascular ischemic white matter changes.  3.  Multiple chronic lacunae as detailed above.   Cervical spine CT w/o contrast    Impression    IMPRESSION:  1.  No fracture or posttraumatic subluxation.     Basic metabolic panel   Result Value Ref Range    Sodium 144 136 - 145 mmol/L    Potassium 4.2 3.4 - 5.3 mmol/L    Chloride 107 98 - 107 mmol/L    Carbon Dioxide (CO2) 27 22 - 29 mmol/L    Anion Gap 10 7 - 15 mmol/L    Urea Nitrogen 32.4 (H) 8.0 - 23.0 mg/dL    Creatinine 0.97 (H) 0.51 - 0.95 mg/dL    Calcium 9.0 8.8 - 10.2 mg/dL    Glucose 110 (H) 70 - 99 mg/dL    GFR Estimate 61 >60 mL/min/1.73m2   Result Value Ref Range    Magnesium 2.0 1.7 - 2.3 mg/dL   CBC with platelets   Result Value Ref Range    WBC Count 11.1 (H) 4.0 - 11.0 10e3/uL    RBC Count 5.20 3.80 - 5.20 10e6/uL    Hemoglobin 13.4 11.7 - 15.7 g/dL    Hematocrit 41.7 35.0 - 47.0 %    MCV 80 78 - 100 fL    MCH 25.8 (L) 26.5 - 33.0 pg    MCHC 32.1 31.5 - 36.5 g/dL    RDW 14.7 10.0 - 15.0 %    Platelet Count 227 150 - 450 10e3/uL   Result Value Ref Range    CK 40 26 - 192 U/L       EKG:  Performed at:  27-FEB-2023 13:29    Impression: Normal sinus rhythm     Rate: 70   Rhythm: Normal sinus  Axis: 3  ME Interval: 130  QRS Interval: 78  QTc Interval: 451  ST Changes:None  Comparison: Anterior and lateral leads have resolved when compared to ECG of 29-DEC-2020.     I have independently reviewed and interpreted the EKG(s) documented above.      The creation of this record is based on the scribe s observations of the work being performed by Shaniqua Berkowitz MD and the provider s statements to them. It was created on his behalf by Julia Bennett, a trained medical scribe. This document has been checked and approved by the attending provider.    Shaniqua Berkowitz MD  Emergency Medicine  [facility] EMERGENCY DEPARTMENT  [facility] [facility] [id number] [phone]  Dept: [phone]      Shaniqua Berkowitz MD  02/27/23 2040

## 2023-02-27 NOTE — ED NOTES
"Pt able to ambulate 1assist with staff and walker and gait belt. Pt states she continues to be weaker than normal. Pt slow moving,  at bedside states \"she looks less steady\" Pt was unable to give urine sample at this time.   "

## 2023-02-27 NOTE — ED NOTES
[facility] ED Handoff Report    ED Chief Complaint: fall/weakness/CVA hx    ED Diagnosis:  (W19.XXXA) Fall, initial encounter  Comment: fall at home   Plan: admit to obs/PT    (R26.2) Unable to ambulate  Comment: weakness and shuffling pattern  Plan:     (M62.81) Generalized muscle weakness  Comment:   Plan:        PMH:    Past Medical History:   Diagnosis Date     Hypertension      Pneumonia      Stroke (H)         Code Status:  No Order     Falls Risk: Yes Band: Applied multiple falls at home in recent days     Current Living Situation/Residence: lives with a significant other at assisted living.    Elimination Status: Continent: wears briefs and is incontinent at times     Activity Level: SBA w/ walker    Patients Preferred Language:  English     Needed: No    Vital Signs:  BP (!) 166/77   Pulse 72   Temp 97.6  F (36.4  C) (Oral)   Resp 30   Wt 59 kg (130 lb)   SpO2 96%   BMI 25.39 kg/m       Cardiac Rhythm: NSR    Pain Score: 2/10 generalized aches/ baseline    Is the Patient Confused:  No    Last Food or Drink: 2/26/23 at dinner time    Focused Assessment: pt resides in [facility] living with  who does most cares. Pt has had recent falls at home and has complaints of increased weakness. Staff noted pt had multiple falls today with last requiring EMS to pick patient up. Pt was unable to stand on own. Pt uses walker at baseline.  states pattern of decreased ability to walk at home.    Tests Performed: Done: Labs and Imaging    Treatments Provided:  Admit     Family Dynamics/Concerns: No    Family Updated On Visitor Policy: Yes    Plan of Care Communicated to Family: Yes    Who Was Updated about Plan of Care:  at bedside     Belongings Checklist Done and Signed by Patient: Yes    Medications sent with patient: none    Covid: asymptomatic , negative    Additional Information: pt has deficits from previous stroke. Has Gtube and does not consume anything by mouth. Pt has hx of  aspiration and often requires suction of her saliva.     RN: ANISHA HACKETT RN   2/27/2023 4:02 PM

## 2023-02-28 ENCOUNTER — APPOINTMENT (OUTPATIENT)
Dept: PHYSICAL THERAPY | Facility: HOSPITAL | Age: 73
End: 2023-02-28
Attending: INTERNAL MEDICINE
Payer: COMMERCIAL

## 2023-02-28 ENCOUNTER — APPOINTMENT (OUTPATIENT)
Dept: OCCUPATIONAL THERAPY | Facility: HOSPITAL | Age: 73
End: 2023-02-28
Attending: INTERNAL MEDICINE
Payer: COMMERCIAL

## 2023-02-28 ENCOUNTER — DOCUMENTATION ONLY (OUTPATIENT)
Dept: OTHER | Facility: CLINIC | Age: 73
End: 2023-02-28
Payer: COMMERCIAL

## 2023-02-28 VITALS
SYSTOLIC BLOOD PRESSURE: 168 MMHG | RESPIRATION RATE: 28 BRPM | BODY MASS INDEX: 25.52 KG/M2 | HEART RATE: 84 BPM | HEIGHT: 60 IN | DIASTOLIC BLOOD PRESSURE: 79 MMHG | TEMPERATURE: 98.2 F | WEIGHT: 130 LBS | OXYGEN SATURATION: 95 %

## 2023-02-28 LAB
ALBUMIN UR-MCNC: 10 MG/DL
ANION GAP SERPL CALCULATED.3IONS-SCNC: 12 MMOL/L (ref 7–15)
APPEARANCE UR: CLEAR
BACTERIA #/AREA URNS HPF: ABNORMAL /HPF
BILIRUB UR QL STRIP: NEGATIVE
BUN SERPL-MCNC: 27.4 MG/DL (ref 8–23)
CALCIUM SERPL-MCNC: 8.8 MG/DL (ref 8.8–10.2)
CHLORIDE SERPL-SCNC: 105 MMOL/L (ref 98–107)
COLOR UR AUTO: YELLOW
CREAT SERPL-MCNC: 0.88 MG/DL (ref 0.51–0.95)
DEPRECATED HCO3 PLAS-SCNC: 25 MMOL/L (ref 22–29)
ERYTHROCYTE [DISTWIDTH] IN BLOOD BY AUTOMATED COUNT: 15 % (ref 10–15)
GFR SERPL CREATININE-BSD FRML MDRD: 69 ML/MIN/1.73M2
GLUCOSE SERPL-MCNC: 100 MG/DL (ref 70–99)
GLUCOSE UR STRIP-MCNC: NEGATIVE MG/DL
HCT VFR BLD AUTO: 45 % (ref 35–47)
HGB BLD-MCNC: 14.1 G/DL (ref 11.7–15.7)
HGB UR QL STRIP: NEGATIVE
KETONES UR STRIP-MCNC: 10 MG/DL
LEUKOCYTE ESTERASE UR QL STRIP: ABNORMAL
MCH RBC QN AUTO: 25.3 PG (ref 26.5–33)
MCHC RBC AUTO-ENTMCNC: 31.3 G/DL (ref 31.5–36.5)
MCV RBC AUTO: 81 FL (ref 78–100)
NITRATE UR QL: POSITIVE
PH UR STRIP: 7 [PH] (ref 5–7)
PLATELET # BLD AUTO: 221 10E3/UL (ref 150–450)
POTASSIUM SERPL-SCNC: 3.8 MMOL/L (ref 3.4–5.3)
RBC # BLD AUTO: 5.58 10E6/UL (ref 3.8–5.2)
RBC URINE: 2 /HPF
SODIUM SERPL-SCNC: 142 MMOL/L (ref 136–145)
SP GR UR STRIP: 1.03 (ref 1–1.03)
SQUAMOUS EPITHELIAL: 1 /HPF
UROBILINOGEN UR STRIP-MCNC: <2 MG/DL
WBC # BLD AUTO: 10.4 10E3/UL (ref 4–11)
WBC URINE: 36 /HPF

## 2023-02-28 PROCEDURE — G0378 HOSPITAL OBSERVATION PER HR: HCPCS

## 2023-02-28 PROCEDURE — 36415 COLL VENOUS BLD VENIPUNCTURE: CPT | Performed by: INTERNAL MEDICINE

## 2023-02-28 PROCEDURE — 97165 OT EVAL LOW COMPLEX 30 MIN: CPT | Mod: GO

## 2023-02-28 PROCEDURE — 96374 THER/PROPH/DIAG INJ IV PUSH: CPT | Mod: XU

## 2023-02-28 PROCEDURE — 96376 TX/PRO/DX INJ SAME DRUG ADON: CPT

## 2023-02-28 PROCEDURE — 97161 PT EVAL LOW COMPLEX 20 MIN: CPT | Mod: GP

## 2023-02-28 PROCEDURE — 87086 URINE CULTURE/COLONY COUNT: CPT | Performed by: EMERGENCY MEDICINE

## 2023-02-28 PROCEDURE — 250N000013 HC RX MED GY IP 250 OP 250 PS 637: Performed by: INTERNAL MEDICINE

## 2023-02-28 PROCEDURE — 250N000011 HC RX IP 250 OP 636: Performed by: INTERNAL MEDICINE

## 2023-02-28 PROCEDURE — 97110 THERAPEUTIC EXERCISES: CPT | Mod: GP

## 2023-02-28 PROCEDURE — 97535 SELF CARE MNGMENT TRAINING: CPT | Mod: GO

## 2023-02-28 PROCEDURE — 81001 URINALYSIS AUTO W/SCOPE: CPT | Performed by: EMERGENCY MEDICINE

## 2023-02-28 PROCEDURE — 80048 BASIC METABOLIC PNL TOTAL CA: CPT | Performed by: INTERNAL MEDICINE

## 2023-02-28 PROCEDURE — 85027 COMPLETE CBC AUTOMATED: CPT | Performed by: INTERNAL MEDICINE

## 2023-02-28 RX ORDER — GUAR GUM
2 PACKET (EA) ORAL 2 TIMES DAILY
Status: DISCONTINUED | OUTPATIENT
Start: 2023-02-28 | End: 2023-02-28 | Stop reason: HOSPADM

## 2023-02-28 RX ORDER — DEXTROSE MONOHYDRATE 100 MG/ML
INJECTION, SOLUTION INTRAVENOUS CONTINUOUS PRN
Status: DISCONTINUED | OUTPATIENT
Start: 2023-02-28 | End: 2023-02-28 | Stop reason: HOSPADM

## 2023-02-28 RX ORDER — HYDROCHLOROTHIAZIDE 12.5 MG/1
12.5 TABLET ORAL DAILY
Status: DISCONTINUED | OUTPATIENT
Start: 2023-02-28 | End: 2023-02-28 | Stop reason: HOSPADM

## 2023-02-28 RX ORDER — HYDROCHLOROTHIAZIDE 12.5 MG/1
12.5 TABLET ORAL DAILY
Qty: 30 TABLET | Refills: 0 | Status: SHIPPED | OUTPATIENT
Start: 2023-02-28 | End: 2023-03-30

## 2023-02-28 RX ORDER — AMLODIPINE BESYLATE 10 MG/1
10 TABLET ORAL DAILY
Qty: 30 TABLET | Refills: 0 | Status: SHIPPED | OUTPATIENT
Start: 2023-03-01 | End: 2023-03-31

## 2023-02-28 RX ORDER — AMLODIPINE BESYLATE 5 MG/1
10 TABLET ORAL DAILY
Status: DISCONTINUED | OUTPATIENT
Start: 2023-03-01 | End: 2023-02-28 | Stop reason: HOSPADM

## 2023-02-28 RX ORDER — SULFAMETHOXAZOLE/TRIMETHOPRIM 800-160 MG
1 TABLET ORAL 2 TIMES DAILY
Qty: 12 TABLET | Refills: 0 | Status: SHIPPED | OUTPATIENT
Start: 2023-02-28 | End: 2023-03-06

## 2023-02-28 RX ORDER — HYDROCHLOROTHIAZIDE 12.5 MG/1
12.5 TABLET ORAL DAILY
Status: DISCONTINUED | OUTPATIENT
Start: 2023-02-28 | End: 2023-02-28

## 2023-02-28 RX ADMIN — Medication 50 MCG: at 10:44

## 2023-02-28 RX ADMIN — Medication 2 PACKET: at 11:26

## 2023-02-28 RX ADMIN — HYDRALAZINE HYDROCHLORIDE 10 MG: 20 INJECTION INTRAMUSCULAR; INTRAVENOUS at 08:23

## 2023-02-28 RX ADMIN — ASPIRIN 81 MG: 81 TABLET, CHEWABLE ORAL at 10:44

## 2023-02-28 RX ADMIN — HYDRALAZINE HYDROCHLORIDE 10 MG: 20 INJECTION INTRAMUSCULAR; INTRAVENOUS at 00:39

## 2023-02-28 RX ADMIN — AMLODIPINE BESYLATE 5 MG: 5 TABLET ORAL at 10:44

## 2023-02-28 RX ADMIN — HYDROCHLOROTHIAZIDE 12.5 MG: 12.5 TABLET ORAL at 12:07

## 2023-02-28 RX ADMIN — ANORECTAL OINTMENT 1 G: 15.7; .44; 24; 20.6 OINTMENT TOPICAL at 10:46

## 2023-02-28 RX ADMIN — CITALOPRAM HYDROBROMIDE 20 MG: 20 TABLET ORAL at 10:44

## 2023-02-28 ASSESSMENT — ACTIVITIES OF DAILY LIVING (ADL)
ADLS_ACUITY_SCORE: 37
ADLS_ACUITY_SCORE: 43
ADLS_ACUITY_SCORE: 37

## 2023-02-28 NOTE — CONSULTS
Care Management Initial Consult    General Information  Assessment completed with: Spouse or significant other, Patient, pt and spouse Oscar  Type of CM/SW Visit: CM Role Introduction    Primary Care Provider verified and updated as needed: Yes   Readmission within the last 30 days: no previous admission in last 30 days   Return Category: Progression of disease  Reason for Consult: discharge planning  Advance Care Planning: Advance Care Planning Reviewed: no concerns identified          Communication Assessment  Patient's communication style: spoken language (English or Bilingual)             Cognitive  Cognitive/Neuro/Behavioral:                        Living Environment:   People in home: spouse     Current living Arrangements: assisted living  Name of Facility: [facility]   Able to return to prior arrangements: yes       Family/Social Support:  Care provided by: spouse/significant other, other (see comments) (staff)  Provides care for: no one  Marital Status:   , Facility resident(s)/Staff          Description of Support System: Supportive, Involved    Support Assessment: Adequate family and caregiver support, Adequate social supports    Current Resources:   Patient receiving home care services: No     Community Resources: None  Equipment currently used at home: walker, standard  Supplies currently used at home: Enteral Nutrition & Supplies, Nutritional Supplements    Employment/Financial:  Employment Status:          Financial Concerns: No concerns identified   Referral to Financial Worker: No       Lifestyle & Psychosocial Needs:  Social Determinants of Health     Tobacco Use: Low Risk      Smoking Tobacco Use: Never     Smokeless Tobacco Use: Never     Passive Exposure: Not on file   Alcohol Use: Not on file   Financial Resource Strain: Not on file   Food Insecurity: Not on file   Transportation Needs: Not on file   Physical Activity: Not on file   Stress: Not on file   Social  Connections: Not on file   Intimate Partner Violence: Not on file   Depression: Not at risk     PHQ-2 Score: 0   Housing Stability: Not on file       Functional Status:  Prior to admission patient needed assistance:   Dependent ADLs:: Ambulation-walker, Bathing, Dressing, Grooming  Dependent IADLs:: Transportation  Assesssment of Functional Status: Not at  functional baseline    Mental Health Status:  Mental Health Status: No Current Concerns       Chemical Dependency Status:                Values/Beliefs:  Spiritual, Cultural Beliefs, Mu-ism Practices, Values that affect care:                 Additional Information:  Met with patient and spouse Oscar to review role of care management, progression of care and possible need for services at discharge, including OP services, home care, or skilled nursing care. Patient alert, oriented and engaged in the conversation.     Pt from [facility] apartment with spouse; had a fall at home. Pt has feeding tube. Therapy consults pending. Discussed FARRELL.   Peptamen 1.5 aaron via G-tube at 11:30am at 250ml/hr, and 2nd feeding at 2:30p to 8am at 40ml/hr. Pt has missed 11am feeding and now asking for feeding and medications. Updated RN.      Maegan Mcghee RN

## 2023-02-28 NOTE — CONSULTS
CLINICAL NUTRITION SERVICES - ASSESSMENT NOTE     Nutrition Prescription    RECOMMENDATIONS FOR MDs/PROVIDERS TO ORDER:      Malnutrition Status:    Does not meet criteria    Recommendations already ordered by Registered Dietitian (RD):  Vital 1.5 250 mL x 1 hour at 11:30 AM, and 40 mL/hr starting at 2:30 PM - 7:15 AM  Flush with 120 mL water before and after feedings, and 2 additional times daily ~ 8 AM, 10 AM  Nutrisource fiber 2 packets 2 times daily    TF provides: 1000 mL, 1500 calories, 68 gm protein, 187 gm cho, 57 gm fat, 6 gm fiber, 764 mL free water  + 720 mL water flushes = 1484 mL water//day    + 60 calories, 16 gm cho and 12 gm fiber from Nutrisource fiber    TF meets/exceeds estimated nutrition needs    Future/Additional Recommendations:  Will monitor TF tolerance, weight, labs     REASON FOR ASSESSMENT  Bing Rodrigues is a/an 73 year old female assessed by the dietitian for Provider Order - Registered Dietitian to Assess and Order TF per Medical Nutrition Therapy Protocol    Per chart, pt with  PMH of HTN, CKD 3, GERD, CVA with left hemiplegia and hemiparesis, cognitive impairment, malnutrition with gastrostomy, dysphagia    NUTRITION HISTORY  Reviewed MAR and spoke with pt's , Oscar, over the telephone to determine home TF regimen.  Pt is 100% dependent on TF for nutrition needs.    Home TF regimen is not completely clear between home med record and spouse:  Peptamen 1.5 250 mL over 1 hour at 11:30 AM, and 40 mL/hr from 2:30 PM - 7:15 AM.  Nutrisource fiber 4 teaspoons daily - water flushes after for pt to tolerate.  Tube is flushed before and after tube feeding and twice in the morning after medications.  (Writer informed pt's spouse we would attempt to mimic home regimen as able - the hospital will use our formulary of Vital 1.5 which is similar to Peptamen 1.5.)         Pt should discharge back to home regimen    CURRENT NUTRITION ORDERS  Diet: NPO    LABS  Labs reviewed  BUN 27.4  "H  Glucose 100    MEDICATIONS  Medications reviewed  Vit D3    ANTHROPOMETRICS  Height: 152.4 cm (5' 0\")  Most Recent Weight: 59 kg (130 lb)    IBW: 45.5 kg  BMI: Overweight BMI 25-29.9  Weight History:   Wt Readings from Last 3 Encounters:   02/27/23 59 kg (130 lb)   09/09/22 59.5 kg (131 lb 1.6 oz)   08/13/21 59 kg (130 lb)       Dosing Weight: 48.8 kg  Adjusted    ASSESSED NUTRITION NEEDS  Estimated Energy Needs: 1221 -1464 kcals/day (25 - 30 kcals/kg)  Justification: Maintenance  Estimated Protein Needs: 49- 59 grams protein/day (1 - 1.2 grams of pro/kg)  Justification: Maintenance  Estimated Fluid Needs: 1221- 1464 mL/day (1 mL/kcal), or per provider   Justification: Maintenance    PHYSICAL FINDINGS  Per chart,  Gastrostomy tube replaced 11/14/22    MALNUTRITION:  % Weight Loss:  None noted  % Intake:  Decreased intake does not meet criteria for malnutrition - no tube feeding last night  Subcutaneous Fat Loss:  None observed - did not complete full exam - pt alone and has some cognitive impairment  Muscle Loss:  None observed  Fluid Retention:  None noted    Malnutrition Diagnosis: Patient does not meet two of the above criteria necessary for diagnosing malnutrition    NUTRITION DIAGNOSIS  Swallowing difficulty related to CVA as evidenced by NPO, tube feeding dependent      INTERVENTIONS  Implementation  Nutrition education - obtained TF regimen from pt's  (they live in shelter)  Collaborated with nursing to review TF orders   Enteral Nutrition - Initiate  Feeding tube flush     Goals  Tolerate TF  Meet estimated nutrition needs     Monitoring/Evaluation  Progress toward goals will be monitored and evaluated per protocol.  "

## 2023-02-28 NOTE — PLAN OF CARE
Occupational Therapy Discharge Summary    Reason for therapy discharge:    Discharged to home with home therapy.    Progress towards therapy goal(s). See goals on Care Plan in Psychiatric electronic health record for goal details.  Goals partially met.  Barriers to achieving goals:   limited tolerance for therapy. and delay in processing new information      Therapy recommendation(s):    Continued therapy is recommended.  Rationale/Recommendations:  for home safety evaluation and increase ADL independence.

## 2023-02-28 NOTE — PHARMACY-ADMISSION MEDICATION HISTORY
Pharmacy Note - Admission Medication History    Pertinent Provider Information: (rocio) helps with meds and feedings     ______________________________________________________________________    Prior To Admission (PTA) med list completed and updated in EMR.       PTA Med List   Medication Sig Last Dose     acetaminophen (TYLENOL) 500 mg/15.62 mL solution [ACETAMINOPHEN (TYLENOL) 500 MG/15.62 ML SOLUTION] 19.99 mL (640 mg total) by G-tube route every 4 (four) hours as needed for fever or pain. not recently     amLODIPine (NORVASC) 5 MG tablet TAKE 1 TABLET (5 MG) BY PER G TUBE ROUTE DAILY Strength: 5 mg 2/26/2023 at am     aspirin 81 mg chewable tablet [ASPIRIN 81 MG CHEWABLE TABLET] 1 tablet (81 mg total) by G-tube route daily. 2/26/2023 at am     cholecalciferol, vitamin D3, 1,000 unit tablet [CHOLECALCIFEROL, VITAMIN D3, 1,000 UNIT TABLET] 1 tablet (1,000 Units total) by G-tube route daily. (Patient taking differently: 2,000 Units by Per G Tube route daily) 2/26/2023 at am     citalopram (CELEXA) 20 MG tablet Take 1 tablet (20 mg) by mouth daily (Patient taking differently: 20 mg by Per G Tube route daily) 2/26/2023 at am     menthol-zinc oxide (CALMOSEPTINE) 0.44-20.6 % OINT ointment Apply 1 g topically 2 times daily Around feeding tube site 2/26/2023     nut.tx.impaired digest fxn (PEPTAMEN 1.5) 0.068 gram- 1.5 kcal/mL Liqd [NUT.TX.IMPAIRED DIGEST FXN (PEPTAMEN 1.5) 0.068 GRAM- 1.5 KCAL/ML LIQD] Take 1 Bottle by mouth 4 (four) times a day. Peptamen 1.5 @@ 40 ml/hr for 22 hours then off for 2 hours.  Total of 520 CC over 24 hours 2/26/2023     NUTRISOURCE FIBER Powd 4 teaspoonful by Per G Tube route daily 2/26/2023       Information source(s): Patient, Family member, Clinic records and CareEverywhere/SureScripts  Method of interview communication: in-person    Summary of Changes to PTA Med List  New: Calmoseptine ointment  Discontinued: loperamide, clotrimazole cream, tums prn, nystatin cream  Changed:  n/a    Patient was asked about OTC/herbal products specifically.  PTA med list reflects this.    In the past week, patient estimated taking medication this percent of the time:  greater than 90%.    Medication Affordability:       Allergies were reviewed, assessed, and updated with the patient.      Patient does not use any multi-dose medications prior to admission.    The information provided in this note is only as accurate as the sources available at the time of the update(s).    Thank you for the opportunity to participate in the care of this patient.    Edison Adams [facility]  2/27/2023 6:44 PM

## 2023-02-28 NOTE — PROGRESS NOTES
02/28/23 0119   Appointment Info   Signing Clinician's Name / Credentials (OT) Naomie Jackson OTR/L   Living Environment   People in Home facility resident;spouse   Current Living Arrangements assisted living   Home Accessibility no concerns   Transportation Anticipated other (see comments)   Self-Care   Usual Activity Tolerance moderate   Current Activity Tolerance moderate   Regular Exercise No   Equipment Currently Used at Home walker, rolling   Fall history within last six months yes   Activity/Exercise/Self-Care Comment Patient reports she previously mobilized with assist of 1 with 2 wheeled walker. States her  assists her with her medications and laundry, facility assists with cooking and cleaning.   Instrumental Activities of Daily Living (IADL)   IADL Comments  provides laundry services, sets up meds.  assisted living provides meals and cleaning.   General Information   Onset of Illness/Injury or Date of Surgery 02/27/23   Referring Physician Damari Carter MD   Patient/Family Therapy Goal Statement (OT) patient and  would like her to return home at discharge.  agreeable to therapy in the home to help her increase her independence   Additional Occupational Profile Info/Pertinent History of Current Problem Bing Rodrigues is a 73 year old female PMH of HTN, CKD 3, GERD, CVA with left hemiplegia and hemiparesis, cognitive impairment, malnutrition with gastrostomy tube, presented to ED for evaluation of unwitnessed fall and inability to ambulate afterwards.  Patient with dysarthria, difficult to understand.  HPI obtained from ED provider, who spoke to patient's .  Patient's  left by the time I seen patient in ED.  Patient describes fall as mechanical, losing balance and falling into her closet when she was not using her walker.   She denies any pain, head, neck, chest pain, dyspnea, cough, dysuria, fever.  CT head-neck negative for acute traumatic injuries, showed  sequelae of multiple chronic lacunar infarcts.   Performance Patterns (Routines, Roles, Habits) pt reports that she came into the hospital after a fall walking from her bed to her walker which she stores 5 feet from her bed.   Existing Precautions/Restrictions fall   Cognitive Status Examination   Follows Commands WFL;repetition of directions required;verbal cues/prompting required;physical/tactile prompts required;75-90% accuracy;increased processing time needed   Cognitive Status Comments required repeated instructions for walker safety.  fair follow through with safety instructions   Strength Comprehensive (MMT)   Comment, General Manual Muscle Testing (MMT) Assessment WFL   Bed Mobility   Bed Mobility supine-sit;sit-supine   Supine-Sit Buhl (Bed Mobility) supervision   Sit-Supine [place] (Bed Mobility) supervision   Transfers   Transfers bed-chair transfer;toilet transfer   Transfer Skill: Bed to Chair/Chair to Bed   Bed-Chair Buhl (Transfers) supervision   Assistive Device (Bed-Chair Transfers) rolling walker   Transfer Comments no gross episodes of loss of balance.  reaching for walker to help her get up from seated position   Toilet Transfer   Type (Toilet Transfer) sit-stand   Buhl Level (Toilet Transfer) supervision   Assistive Device (Toilet Transfer) grab bars/safety frame;walker, front-wheeled   Clinical Impression   Criteria for Skilled Therapeutic Interventions Met (OT) Yes, treatment indicated   OT Diagnosis reduced functional moblity and fall risk   Influenced by the following impairments fall   OT Problem List-Impairments impacting ADL problems related to;activity tolerance impaired;mobility   Assessment of Occupational Performance 1-3 Performance Deficits   Identified Performance Deficits balance with moblity, reduced safety   Planned Therapy Interventions (OT) ADL retraining;transfer training;progressive activity/exercise;home program guidelines   Clinical Decision  Making Complexity (OT) moderate complexity   Risk & Benefits of therapy have been explained evaluation/treatment results reviewed;care plan/treatment goals reviewed;risks/benefits reviewed;current/potential barriers reviewed;participants voiced agreement with care plan;participants included;patient   OT Total Evaluation Time   OT Eval, Low Complexity Minutes (87415) 13   Therapy Certification   Start of Care Date 02/28/23   Certification date from 02/28/23   Certification date to 03/28/23   Medical Diagnosis sp fall   OT Goals   Therapy Frequency (OT) Daily   OT Predicted Duration/Target Date for Goal Attainment 03/03/23   OT Goals Transfers   OT: Transfer Supervision/stand-by assist;with assistive device  (with walker safety techniques)   OT Discharge Planning   OT Plan review walker safety fall risk reduction, establish plan of care for discharge   OT Discharge Recommendation (DC Rec) home with assist;home with home care occupational therapy   OT Rationale for DC Rec pt is not at baseline and has gotten progressively weaker.  home care for safety evaluation and fall risk reduction   OT Brief overview of current status SBA transfers and moblity.   Total Session Time   Total Session Time (sum of timed and untimed services) 13    [facility]  OUTPATIENT OCCUPATIONAL THERAPY  EVALUATION  PLAN OF TREATMENT FOR OUTPATIENT REHABILITATION  (COMPLETE FOR INITIAL CLAIMS ONLY)  Patient's Last Name, First Name, M.I.  YOB: 1950  Bing Rodrigues                          Provider's Name  [facility] Medical Record No.  [id number]                             Onset Date:  02/27/23   Start of Care Date:  02/28/23   Type:     ___PT   _X_OT   ___SLP Medical Diagnosis:  sp fall                    OT Diagnosis:  reduced functional moblity and fall risk Visits from SOC:  1     See note for plan of treatment, functional goals and certification details    I  CERTIFY THE NEED FOR THESE SERVICES FURNISHED UNDER        THIS PLAN OF TREATMENT AND WHILE UNDER MY CARE     (Physician co-signature of this document indicates review and certification of the therapy plan).

## 2023-02-28 NOTE — PROGRESS NOTES
02/28/23 0845   Appointment Info   Signing Clinician's Name / Credentials (PT) Philly Garrido, PT, DPT   Quick Adds   Quick Adds Certification   Living Environment   People in Home facility resident;spouse   Current Living Arrangements assisted living   Home Accessibility no concerns   Transportation Anticipated other (see comments)  (Patient and  do not drive, use [employer] for transportation.)   Self-Care   Usual Activity Tolerance good   Current Activity Tolerance good   Equipment Currently Used at Home walker, rolling  (4WW)   Fall history within last six months yes   Number of times patient has fallen within last six months 2   Activity/Exercise/Self-Care Comment Patient reports she previously mobilized with assist of 1 and 4WW. States her  assists her with her medications and laundry, facility assists with cooking and cleaning.   General Information   Onset of Illness/Injury or Date of Surgery 02/27/23   Referring Physician Damari Carter MD   Patient/Family Therapy Goals Statement (PT) To go home   Pertinent History of Current Problem (include personal factors and/or comorbidities that impact the POC) Bing Rodrigues is a 73 year old female PMH of HTN, CKD 3, GERD, CVA with left hemiplegia and hemiparesis, cognitive impairment, malnutrition with gastrostomy tube, presented to ED for evaluation of unwitnessed fall and inability to ambulate afterwards.  Patient with dysarthria, difficult to understand.  HPI obtained from ED provider, who spoke to patient's .  Patient's  left by the time I seen patient in ED.  Patient describes fall as mechanical, losing balance and falling into her closet when she was not using her walker.   She denies any pain, head, neck, chest pain, dyspnea, cough, dysuria, fever.  CT head-neck negative for acute traumatic injuries, showed sequelae of multiple chronic lacunar infarcts.   Existing Precautions/Restrictions fall   Weight-Bearing  Status - LLE full weight-bearing   Weight-Bearing Status - RLE full weight-bearing   Cognition   Affect/Mental Status (Cognition) WFL   Orientation Status (Cognition) oriented x 4   Follows Commands (Cognition) WFL   Range of Motion (ROM)   Range of Motion ROM is WFL   Strength (Manual Muscle Testing)   Strength (Manual Muscle Testing) strength is WFL   Bed Mobility   Bed Mobility supine-sit   Supine-Sit Eagle (Bed Mobility) supervision   Assistive Device (Bed Mobility) bed rails   Transfers   Transfers sit-stand transfer   Maintains Weight-bearing Status (Transfers) able to maintain   Sit-Stand Transfer   Sit-Stand Eagle (Transfers) contact guard;1 person to manage equipment   Assistive Device (Sit-Stand Transfers) walker, front-wheeled   Gait/Stairs (Locomotion)   Eagle Level (Gait) contact guard;1 person to manage equipment   Assistive Device (Gait) walker, front-wheeled   Distance in Feet 50   Pattern (Gait) step-through   Deviations/Abnormal Patterns (Gait) base of support, narrow;cari decreased;festinating/shuffling;gait speed decreased;stride length decreased   Comment, (Gait/Stairs) Assist of 1, unsteady   Clinical Impression   Criteria for Skilled Therapeutic Intervention Yes, treatment indicated   PT Diagnosis (PT) Impaired functional mobility   Influenced by the following impairments Weakness   Functional limitations due to impairments Transfers, gait   Clinical Presentation (PT Evaluation Complexity) Evolving/Changing   Clinical Presentation Rationale Patient presents as medically diagnosed   Clinical Decision Making (Complexity) low complexity   Planned Therapy Interventions (PT) balance training;bed mobility training;gait training;home exercise program;neuromuscular re-education;patient/family education;strengthening;transfer training   Anticipated Equipment Needs at Discharge (PT) walker, rolling  (4WW)   Risk & Benefits of therapy have been explained evaluation/treatment  results reviewed;care plan/treatment goals reviewed;participants voiced agreement with care plan;participants included;patient   PT Total Evaluation Time   PT Eval, Low Complexity Minutes (07216) 10   Therapy Certification   Start of care date 02/28/23   Certification date from 02/28/23   Certification date to 03/07/23   Medical Diagnosis Fall   Physical Therapy Goals   PT Frequency 5x/week   PT Predicted Duration/Target Date for Goal Attainment 03/07/23   PT Goals Transfers;Gait   PT: Transfers Modified independent;Sit to/from stand;Bed to/from chair;Assistive device   PT: Gait Supervision/stand-by assist;Assistive device;100 feet   Interventions   Interventions Quick Adds Therapeutic Procedure   Therapeutic Procedure/Exercise   Ther. Procedure: strength, endurance, ROM, flexibillity Minutes (86281) 13   Symptoms Noted During/After Treatment none   Treatment Detail/Skilled Intervention Patient performed supine BLE exercises x 10 with verbal cueing and visual demonstration requried for proper technique.   PT Discharge Planning   PT Plan Bed mobility, transfers, gait, LE ex, balance   PT Discharge Recommendation (DC Rec) home with assist;home with home care physical therapy   PT Rationale for DC Rec Patient currently requires assist of 1 for bed mobility, transfers, and gait. She lives in an assisted living facility with her  who provides assistance with medications and laundry at baseline per patient report. She receives assistance with cooking and cleaning from her assisted living facility. She has access to a walker at home and does use it at baseline. Anticipate patient will be safe to discharge home with assist from  once medically cleared to do so. At this time recommend patient has 24hr assist, assist of 1 for all mobility, use of a walker for safe mobility. Recommend patient recieves home physical therapy upon discharge in order to improve safety, balance, strength, and independence with  mobility.   PT Brief overview of current status Assist of 1 for bed mobility, transfers, gait.   Total Session Time   Timed Code Treatment Minutes 13   Total Session Time (sum of timed and untimed services) 23     [facility]  OUTPATIENT PHYSICAL THERAPY EVALUATION  PLAN OF TREATMENT FOR OUTPATIENT REHABILITATION  (COMPLETE FOR INITIAL CLAIMS ONLY)  Patient's Last Name, First Name, M.I.  YOB: 1950  Bing Rodrigues                        Provider's Name  [facility] Medical Record No.  [id number]                             Onset Date:  02/27/23   Start of Care Date:  02/28/23   Type:     _X_PT   ___OT   ___SLP Medical Diagnosis:  Fall              PT Diagnosis:  Impaired functional mobility Visits from SOC:  1     See note for plan of treatment, functional goals and certification details    I CERTIFY THE NEED FOR THESE SERVICES FURNISHED UNDER        THIS PLAN OF TREATMENT AND WHILE UNDER MY CARE     (Physician co-signature of this document indicates review and certification of the therapy plan).              Philly Garrido, PT, DPT

## 2023-02-28 NOTE — PLAN OF CARE
PRIMARY DIAGNOSIS: GENERALIZED WEAKNESS    OUTPATIENT/OBSERVATION GOALS TO BE MET BEFORE DISCHARGE  1. Orthostatic performed: No    2. Tolerating PO medications: No    3. Return to near baseline physical activity: No    4. Cleared for discharge by consultants (if involved): N/A    Discharge Planner Nurse   Safe discharge environment identified: Yes  Barriers to discharge:        Entered by: Jade Miguel RN 02/28/2023 6:12 AM   NSR/jina on telemetry.  PRN hydralazine given for elevated BP however pt states that her BP in good for her.  Jade Miguel RN    Please review provider order for any additional goals.   Nurse to notify provider when observation goals have been met and patient is ready for discharge.Goal Outcome Evaluation:

## 2023-02-28 NOTE — H&P
[facility]    History and Physical - Hospitalist Service       Date of Admission:  2/27/2023    Assessment & Plan      Bing Rodrigues is a 73 year old female PMH of HTN, CKD 3, GERD, CVA with left hemiplegia and hemiparesis, cognitive impairment, malnutrition with gastrostomy tube, admitted on 2/27/2023 with:    Unwitnessed fall, generalized weakness, unable to ambulate.  Prior ambulated with walker and assistance of once.  No traumatic injuries on CT head/neck.  Mild leukocytosis to 11.1, with neutrophilia.  Patient afebrile, hypertensive, no tachycardia.  No respiratory or skin symptoms.  EKG with NSR.  -UA requested by ED provider at 1 PM, not collected.  I resubmitted the request at 9 PM.  -Bladder scan to evaluate for urinary retention, straight cath prn.  -Telemetry to evaluate for cardiac arrhythmia.  -PT/OT/SW evaluate for disposition needs.    Accelerated hypertension.  PTA on amlodipine, continue.  As needed IV hydralazine.    MDD-on Celexa.    CVD, s/p numerous strokes.  Chronic left-sided weakness, dysarthria, dysphagia.  Tube feeds dependent.  Resume home regimen of TF.  RD consulted.       Diet:  TF  DVT Prophylaxis: Enoxaparin (Lovenox) SQ  Dimas Catheter: Not present  Lines: None     Cardiac Monitoring: None  Code Status: Full Code      Clinically Significant Risk Factors Present on Admission         Disposition Plan      Expected Discharge Date: 02/28/2023               Dmaari Carter MD  Hospitalist Service  [facility]  Securely message with [employer] (more info)  Text page via [employer] Paging/Directory _____________________________________________________________________  Chief Complaint   Unwitnessed fall, generalized weakness, unable to ambulate    History is obtained from the patient, electronic health record and emergency department physician    History of Present Illness   Bing Rodrigues is a 73 year old female PMH of HTN, CKD 3, GERD, CVA with  left hemiplegia and hemiparesis, cognitive impairment, malnutrition with gastrostomy tube, presented to ED for evaluation of unwitnessed fall and inability to ambulate afterwards.  Patient with dysarthria, difficult to understand.  HPI obtained from ED provider, who spoke to patient's .  Patient's  left by the time I seen patient in ED.  Patient describes fall as mechanical, losing balance and falling into her closet when she was not using her walker.   She denies any pain, head, neck, chest pain, dyspnea, cough, dysuria, fever.  CT head-neck negative for acute traumatic injuries, showed sequelae of multiple chronic lacunar infarcts.    Past Medical History    Past Medical History:   Diagnosis Date     Hypertension      Pneumonia      Stroke (H)      Past Surgical History   Past Surgical History:   Procedure Laterality Date     G TUBE REPLACEMENT  2019     HC DILATION/CURETTAGE DIAG/THER NON OB      Description: Dilation And Curettage;  Recorded: 2013;     IR GASTRO JEJUNOSTOMY TUBE CHANGE  2019     IR GASTRO JEJUNOSTOMY TUBE CHANGE  2019     IR GASTRO JEJUNOSTOMY TUBE PLACEMENT  2018     IR GASTROSTOMY TUBE CHANGE  2019     IR GJ TUBE REPLACEMENT  2019     Prior to Admission Medications   Prior to Admission Medications   Prescriptions Last Dose Informant Patient Reported? Taking?   Enteral Nutrition Supplies (MONOJECT ENTERAL SYRINGE/60ML) MISC   Yes No   Si Syringe   NUTRISOURCE FIBER Powd 2023  Yes Yes   Si teaspoonful by Per G Tube route daily   Nutritional Supplements (PEPTAMEN 1.5 SHIVANI) LIQD   Yes No   Sig: Take 1 Bottle by mouth   Patient not taking: Reported on 2022   acetaminophen (TYLENOL) 500 mg/15.62 mL solution not recently  No Yes   Sig: [ACETAMINOPHEN (TYLENOL) 500 MG/15.62 ML SOLUTION] 19.99 mL (640 mg total) by G-tube route every 4 (four) hours as needed for fever or pain.   amLODIPine (NORVASC) 5 MG tablet 2023 at am  No Yes  "  Sig: TAKE 1 TABLET (5 MG) BY PER G TUBE ROUTE DAILY Strength: 5 mg   aspirin 81 mg chewable tablet 2/26/2023 at am  No Yes   Sig: [ASPIRIN 81 MG CHEWABLE TABLET] 1 tablet (81 mg total) by G-tube route daily.   cholecalciferol, vitamin D3, 1,000 unit tablet 2/26/2023 at am  No Yes   Sig: [CHOLECALCIFEROL, VITAMIN D3, 1,000 UNIT TABLET] 1 tablet (1,000 Units total) by G-tube route daily.   Patient taking differently: 2,000 Units by Per G Tube route daily   citalopram (CELEXA) 20 MG tablet 2/26/2023 at am  No Yes   Sig: Take 1 tablet (20 mg) by mouth daily   Patient taking differently: 20 mg by Per G Tube route daily   feeding container and pump set Misc   No No   Sig: [FEEDING CONTAINER AND PUMP SET MISC] Use 30 Bags As Directed every 30 (thirty) days. iSECUREtrac Enteral Feeding Pump Set 1\" 1000 ml 30 bags per month   guar gum (BENEFIBER;NUTRISOURCE) Pack powder packet   No No   Sig: [GUAR GUM (BENEFIBER;NUTRISOURCE) PACK POWDER PACKET] 2 packets by Enteral Tube route 2 (two) times a day.   menthol-zinc oxide (CALMOSEPTINE) 0.44-20.6 % OINT ointment 2/26/2023  Yes Yes   Sig: Apply 1 g topically 2 times daily Around feeding tube site   nut.tx.impaired digest fxn (PEPTAMEN 1.5) 0.068 gram- 1.5 kcal/mL Liqd 2/26/2023  No Yes   Sig: [NUT.TX.IMPAIRED DIGEST FXN (PEPTAMEN 1.5) 0.068 GRAM- 1.5 KCAL/ML LIQD] Take 1 Bottle by mouth 4 (four) times a day. Peptamen 1.5 @@ 40 ml/hr for 22 hours then off for 2 hours.  Total of 520 CC over 24 hours   syringe, ENFit, non-sterile (MONOJECT ENFIT SYRINGE) 60 mL Syrg   No No   Sig: [SYRINGE, ENFIT, NON-STERILE (MONOJECT ENFIT SYRINGE) 60 ML SYRG] Use 1 Syringe As Directed as needed. 30 CC water bolus after before and after feedings   syringe, ENFit, sterile (MONOJECT ENFIT SYRINGE) 12 mL Syrg   No No   Sig: [SYRINGE, ENFIT, STERILE (MONOJECT ENFIT SYRINGE) 12 ML SYRG] Use 1 Syringe As Directed as needed. Monoject enteral syringe female   Patient not taking: Reported on 9/9/2022    "   Facility-Administered Medications: None     Review of Systems    The 10 point Review of Systems is negative other than noted in the HPI or here.    Social History   I have reviewed this patient's social history and updated it with pertinent information if needed.  Social History     Tobacco Use     Smoking status: Never     Smokeless tobacco: Never   Substance Use Topics     Alcohol use: No     Drug use: No     Family History   I have reviewed this patient's family history and updated it with pertinent information if needed.  Family History   Problem Relation Age of Onset     Cerebrovascular Disease Maternal Grandmother      Cerebrovascular Disease Maternal Grandfather      Early Death Paternal Grandmother      Chronic Obstructive Pulmonary Disease Paternal Grandfather      Cancer Father         from smoking     Allergies   Allergies   Allergen Reactions     Nickel      Allergy to non-gold jewelry     Physical Exam   Vital Signs: Temp: 98  F (36.7  C) Temp src: Oral BP: (!) 175/99 Pulse: 62   Resp: 24 SpO2: 93 % O2 Device: None (Room air)    Weight: 130 lbs 0 oz  General: Alert and oriented x 3. Not in obvious distress.  Dysarthria with somewhat difficult to understand speech/patient's baseline poststroke.  HEENT: NC, AT. Neck- supple, No JVP elevation, lymphadenopathy or thyromegaly. Trachea-central.  Chest: Clear to auscultation bilaterally.  Heart: S1S2 regular. No M/R/G.  Abdomen: Soft. NT, ND. No organomegaly. Bowel sounds- active.  Gastrostomy tube in place, site intact.  Back: No spine tenderness. No CVA tenderness.  Extremities: No leg swelling. Peripheral pulses 2+ bilaterally.  Neuro: Left-sided weakness, chronic, poststroke    Medical Decision Making       75 MINUTES SPENT BY ME on the date of service doing chart review, history, exam, documentation & further activities per the note.      Data     I have personally reviewed the following data over the past 24 hrs:    11.1 (H)  \   13.4   / 227     144  107 32.4 (H) /  110 (H)   4.2 27 0.97 (H) \       Imaging results reviewed over the past 24 hrs:   Recent Results (from the past 24 hour(s))   CT Head w/o Contrast    Narrative    EXAM: CT HEAD W/O CONTRAST  LOCATION: [facility]  DATE/TIME: 2/27/2023 2:39 PM    INDICATION: trauma  COMPARISON: Brain MRI 08/17/2018, head CT 08/15/2018  TECHNIQUE: Routine CT Head without IV contrast. Multiplanar reformats. Dose reduction techniques were used.    FINDINGS:  INTRACRANIAL CONTENTS: No intracranial hemorrhage. Mild diffuse cerebral parenchymal volume loss. Moderate volume loss of the cerebellar vermis. No midline shift. The basilar cisterns are patent. Moderate periventricular and scattered foci of deep white   matter hypodensities involving both cerebral hemispheres. Chronic lacunes in the bilateral corona radiata and thalami. Chronic lacunes in the cerebellar hemispheres. Chronic lacunae in the right paramedian nir.    VISUALIZED ORBITS/SINUSES/MASTOIDS: No intraorbital abnormality. Small mucous retention cysts in the left maxillary sinus. Mild mucosal thickening of the ethmoid air cells. No middle ear or mastoid effusion.    BONES/SOFT TISSUES: No acute abnormality.      Impression    IMPRESSION:  1.  No intracranial hemorrhage, mass lesions, hydrocephalus or CT evidence for an acute infarct.  2.  Mild diffuse cerebral parenchymal volume loss. Presumed chronic hypertensive/microvascular ischemic white matter changes.  3.  Multiple chronic lacunae as detailed above.   Cervical spine CT w/o contrast    Narrative    EXAM: CT CERVICAL SPINE W/O CONTRAST  LOCATION: [facility]  DATE/TIME: 2/27/2023 2:41 PM    INDICATION: Trauma, injury.  COMPARISON: Cervical spine CT 07/05/2017  TECHNIQUE: Routine CT Cervical Spine without IV contrast. Multiplanar reformats. Dose reduction techniques were used.    FINDINGS:  VERTEBRA: 1 mm spondylolisthesis of C4 on C5. Craniocervical  junction is within normal limits. Vertebral body heights are maintained. No fractures.    CANAL/FORAMINA: Mild intervertebral disc height loss in the mid to lower cervical spine. No high-grade spinal canal narrowing. Mild right neuroforaminal narrowing at C3-C4. Moderate right neuroforaminal narrowing at C4-C5.    PARASPINAL: No extraspinal abnormality.      Impression    IMPRESSION:  1.  No fracture or posttraumatic subluxation.

## 2023-03-01 LAB — BACTERIA UR CULT: ABNORMAL

## 2023-03-01 NOTE — PLAN OF CARE
Physical Therapy Discharge Summary    Reason for therapy discharge:    Discharged to home with home therapy.    Progress towards therapy goal(s). See goals on Care Plan in [facility] electronic health record for goal details.  Goals not met.  Barriers to achieving goals:   discharge on same date as initial evaluation.    Therapy recommendation(s):    Continued therapy is recommended.  Rationale/Recommendations:  to continue progression toward PLOF.      Ines Rdz, PT

## 2023-03-02 ENCOUNTER — PATIENT OUTREACH (OUTPATIENT)
Dept: CARE COORDINATION | Facility: CLINIC | Age: 73
End: 2023-03-02
Payer: COMMERCIAL

## 2023-03-02 NOTE — PROGRESS NOTES
Clinic Care Coordination Contact  [facility]: Post-Discharge Note  SITUATION                                                      Admission:    Admission Date: 02/27/23   Reason for Admission: Fall     Extremity Weakness  Discharge:   Discharge Date: 02/28/23  Discharge Diagnosis: Fall, initial encounter    2. Unable to ambulate   3. Generalized muscle weakness    BACKGROUND                                                      Per hospital discharge summary and inpatient provider notes:  73 year old female with history of previous CVA with left-sided hemiplegia, dysphagia G-tube dependent for tube feeds, medications, CKD, HTN who presents to the Emergency Department for evaluation of unwitnessed fall, inability to ambulate thereafter    ASSESSMENT           Discharge Assessment  How are you doing now that you are home?: She's doing OK (per ). She is walking OK and seems like her strength is getting better  How are your symptoms? (Red Flag symptoms escalate to triage hotline per guidelines): Improved  Do you feel your condition is stable enough to be safe at home until your provider visit?: Yes  Does the patient have their discharge instructions? : Yes  Does the patient have questions regarding their discharge instructions? : No  Were you started on any new medications or were there changes to any of your previous medications? : Yes  Does the patient have all of their medications?: No (see comment) (brother-in-law with help today per  as he no longer drives)  Do you have questions regarding any of your medications? : No  Do you have all of your needed medical supplies or equipment (DME)?  (i.e. oxygen tank, CPAP, cane, etc.): Yes  Discharge follow-up appointment scheduled within 14 calendar days? : No  Is patient agreeable to assistance with scheduling? : No ( will call to schedule)         Post-op (Clinicians Only)  Did the patient have surgery or a procedure: No     has not yet  heard from home care for additional services. Phone number provided for them as well as 24/7 nurse triage line.  states that patient has regular care from their assisted living  medical provider and has an appointment scheduled 4-2-2023. Advised to move up appointment to within 7 days of wife's discharge date from hospital if able.  with no questions or concerns.     PLAN                                                      Outpatient Plan:  Follow up with PCP within 7 days of discharge     No future appointments.      For any urgent concerns, please contact our 24 hour nurse triage line: [phone] ([phone])         Radha Mason RN

## 2023-03-03 ENCOUNTER — MEDICAL CORRESPONDENCE (OUTPATIENT)
Dept: HEALTH INFORMATION MANAGEMENT | Facility: CLINIC | Age: 73
End: 2023-03-03

## 2023-03-31 ENCOUNTER — HOSPITAL ENCOUNTER (OUTPATIENT)
Dept: RADIOLOGY | Facility: HOSPITAL | Age: 73
Discharge: HOME | End: 2023-03-31
Admitting: PHYSICIAN ASSISTANT
Payer: COMMERCIAL

## 2023-03-31 DIAGNOSIS — Z46.59: ICD-10-CM

## 2023-03-31 PROCEDURE — 49450 REPLACE G/C TUBE PERC: CPT

## 2023-03-31 RX ADMIN — DIATRIZOATE MEGLUMINE AND DIATRIZOATE SODIUM 120 ML: 660; 100 SOLUTION ORAL; RECTAL at 13:34

## 2025-01-06 NOTE — TELEPHONE ENCOUNTER
Telephone Encounter by Kathie Irvin at 8/14/2020 10:21 PM     Author: Kathie Irvin Service: -- Author Type: --    Filed: 8/14/2020 10:26 PM Encounter Date: 8/13/2020 Status: Addendum    : Kathie Irvin    Related Notes: Original Note by Kathie Irvin filed at 8/14/2020 10:26 PM       PRIOR AUTHORIZATION DENIED    Denial Rational: This compound not an FDA approved drug. Benefit exclusion per medicare rules.     [employer] did however approve the separate drug Omeprazole 20mg capsules.               Appeal Information: This medication was denied. If physician would like to appeal because patient has contraindication or allergy to covered medication please write letter of medical necessity and route back to PA team to initiate.  If no further action is needed please close encounter thank you.                 Regular Diet - No restrictions

## (undated) RX ORDER — LIDOCAINE HYDROCHLORIDE 20 MG/ML
JELLY TOPICAL
Status: DISPENSED
Start: 2021-11-12

## (undated) RX ORDER — LIDOCAINE HYDROCHLORIDE 20 MG/ML
JELLY TOPICAL
Status: DISPENSED
Start: 2022-11-14

## (undated) RX ORDER — LIDOCAINE HYDROCHLORIDE 20 MG/ML
JELLY TOPICAL
Status: DISPENSED
Start: 2023-03-31

## (undated) RX ORDER — LIDOCAINE HYDROCHLORIDE 20 MG/ML
JELLY TOPICAL
Status: DISPENSED
Start: 2022-04-01